# Patient Record
Sex: FEMALE | Race: WHITE | Employment: OTHER | URBAN - METROPOLITAN AREA
[De-identification: names, ages, dates, MRNs, and addresses within clinical notes are randomized per-mention and may not be internally consistent; named-entity substitution may affect disease eponyms.]

---

## 2017-07-07 ENCOUNTER — TELEPHONE (OUTPATIENT)
Dept: FAMILY MEDICINE CLINIC | Age: 82
End: 2017-07-07

## 2017-09-13 ENCOUNTER — OFFICE VISIT (OUTPATIENT)
Dept: FAMILY MEDICINE CLINIC | Age: 82
End: 2017-09-13
Payer: MEDICARE

## 2017-09-13 ENCOUNTER — HOSPITAL ENCOUNTER (OUTPATIENT)
Age: 82
Setting detail: SPECIMEN
Discharge: HOME | End: 2017-09-13
Payer: MEDICARE

## 2017-09-13 VITALS
HEIGHT: 65 IN | WEIGHT: 124 LBS | HEART RATE: 75 BPM | DIASTOLIC BLOOD PRESSURE: 70 MMHG | SYSTOLIC BLOOD PRESSURE: 120 MMHG | BODY MASS INDEX: 20.66 KG/M2 | RESPIRATION RATE: 18 BRPM | OXYGEN SATURATION: 97 % | TEMPERATURE: 98.4 F

## 2017-09-13 DIAGNOSIS — N30.00 ACUTE CYSTITIS WITHOUT HEMATURIA: ICD-10-CM

## 2017-09-13 DIAGNOSIS — R52 BODY ACHES: ICD-10-CM

## 2017-09-13 DIAGNOSIS — B96.89 ACUTE BACTERIAL SINUSITIS: Primary | ICD-10-CM

## 2017-09-13 DIAGNOSIS — J01.90 ACUTE BACTERIAL SINUSITIS: Primary | ICD-10-CM

## 2017-09-13 DIAGNOSIS — R00.0 TACHYCARDIA: ICD-10-CM

## 2017-09-13 DIAGNOSIS — R05.9 COUGH: ICD-10-CM

## 2017-09-13 LAB
BILIRUBIN, POC: ABNORMAL
BLOOD URINE, POC: ABNORMAL
CLARITY, POC: CLEAR
COLOR, POC: YELLOW
GLUCOSE URINE, POC: ABNORMAL
KETONES, POC: ABNORMAL
LEUKOCYTE EST, POC: ABNORMAL
NITRITE, POC: ABNORMAL
PH, POC: 5
PROTEIN, POC: ABNORMAL
SPECIFIC GRAVITY, POC: 1.02
UROBILINOGEN, POC: 0.2

## 2017-09-13 PROCEDURE — 1036F TOBACCO NON-USER: CPT | Performed by: NURSE PRACTITIONER

## 2017-09-13 PROCEDURE — G8427 DOCREV CUR MEDS BY ELIG CLIN: HCPCS | Performed by: NURSE PRACTITIONER

## 2017-09-13 PROCEDURE — 81003 URINALYSIS AUTO W/O SCOPE: CPT | Performed by: NURSE PRACTITIONER

## 2017-09-13 PROCEDURE — 1123F ACP DISCUSS/DSCN MKR DOCD: CPT | Performed by: NURSE PRACTITIONER

## 2017-09-13 PROCEDURE — 4040F PNEUMOC VAC/ADMIN/RCVD: CPT | Performed by: NURSE PRACTITIONER

## 2017-09-13 PROCEDURE — 99214 OFFICE O/P EST MOD 30 MIN: CPT | Performed by: NURSE PRACTITIONER

## 2017-09-13 PROCEDURE — 87086 URINE CULTURE/COLONY COUNT: CPT

## 2017-09-13 PROCEDURE — G8400 PT W/DXA NO RESULTS DOC: HCPCS | Performed by: NURSE PRACTITIONER

## 2017-09-13 PROCEDURE — G8420 CALC BMI NORM PARAMETERS: HCPCS | Performed by: NURSE PRACTITIONER

## 2017-09-13 PROCEDURE — 1090F PRES/ABSN URINE INCON ASSESS: CPT | Performed by: NURSE PRACTITIONER

## 2017-09-13 RX ORDER — AMOXICILLIN 500 MG/1
500 CAPSULE ORAL 3 TIMES DAILY
Qty: 30 CAPSULE | Refills: 0 | Status: SHIPPED | OUTPATIENT
Start: 2017-09-13 | End: 2017-09-23

## 2017-09-13 RX ORDER — MEDICAL SUPPLY, MISCELLANEOUS
EACH MISCELLANEOUS
COMMUNITY
End: 2020-07-13 | Stop reason: ALTCHOICE

## 2017-09-13 RX ORDER — AZITHROMYCIN 250 MG/1
TABLET, FILM COATED ORAL
Qty: 1 PACKET | Refills: 0 | Status: CANCELLED | OUTPATIENT
Start: 2017-09-13 | End: 2017-09-23

## 2017-09-13 RX ORDER — BENZONATATE 100 MG/1
100 CAPSULE ORAL 3 TIMES DAILY PRN
Qty: 21 CAPSULE | Refills: 0 | Status: SHIPPED | OUTPATIENT
Start: 2017-09-13 | End: 2017-09-20

## 2017-09-13 ASSESSMENT — ENCOUNTER SYMPTOMS
SORE THROAT: 0
SHORTNESS OF BREATH: 0
RHINORRHEA: 0
EYES NEGATIVE: 1
COUGH: 0
CHEST TIGHTNESS: 0
CONSTIPATION: 0

## 2017-09-13 ASSESSMENT — PATIENT HEALTH QUESTIONNAIRE - PHQ9
2. FEELING DOWN, DEPRESSED OR HOPELESS: 0
1. LITTLE INTEREST OR PLEASURE IN DOING THINGS: 0
SUM OF ALL RESPONSES TO PHQ QUESTIONS 1-9: 0
SUM OF ALL RESPONSES TO PHQ9 QUESTIONS 1 & 2: 0

## 2017-09-14 ENCOUNTER — HOSPITAL ENCOUNTER (OUTPATIENT)
Age: 82
Discharge: HOME | End: 2017-09-14
Payer: MEDICARE

## 2017-09-14 DIAGNOSIS — R00.0 TACHYCARDIA: ICD-10-CM

## 2017-09-14 DIAGNOSIS — N30.00 ACUTE CYSTITIS WITHOUT HEMATURIA: ICD-10-CM

## 2017-09-14 LAB
ANION GAP SERPL CALCULATED.3IONS-SCNC: 9 MMOL/L (ref 9–17)
BUN BLDV-MCNC: 17 MG/DL (ref 8–23)
BUN/CREAT BLD: 27 (ref 9–20)
CALCIUM SERPL-MCNC: 9 MG/DL (ref 8.6–10.4)
CHLORIDE BLD-SCNC: 105 MMOL/L (ref 98–107)
CO2: 28 MMOL/L (ref 20–31)
CREAT SERPL-MCNC: 0.64 MG/DL (ref 0.5–0.9)
CULTURE: NORMAL
CULTURE: NORMAL
GFR AFRICAN AMERICAN: >60 ML/MIN
GFR NON-AFRICAN AMERICAN: >60 ML/MIN
GFR SERPL CREATININE-BSD FRML MDRD: ABNORMAL ML/MIN/{1.73_M2}
GFR SERPL CREATININE-BSD FRML MDRD: ABNORMAL ML/MIN/{1.73_M2}
GLUCOSE BLD-MCNC: 99 MG/DL (ref 70–99)
Lab: NORMAL
POTASSIUM SERPL-SCNC: 4.3 MMOL/L (ref 3.7–5.3)
SODIUM BLD-SCNC: 142 MMOL/L (ref 135–144)
SPECIMEN DESCRIPTION: NORMAL
SPECIMEN DESCRIPTION: NORMAL
STATUS: NORMAL
TSH SERPL DL<=0.05 MIU/L-ACNC: 1.76 MIU/L (ref 0.3–5)

## 2017-09-14 PROCEDURE — 36415 COLL VENOUS BLD VENIPUNCTURE: CPT

## 2017-09-14 PROCEDURE — 80048 BASIC METABOLIC PNL TOTAL CA: CPT

## 2017-09-14 PROCEDURE — 93005 ELECTROCARDIOGRAM TRACING: CPT

## 2017-09-14 PROCEDURE — 84443 ASSAY THYROID STIM HORMONE: CPT

## 2017-09-15 ENCOUNTER — TELEPHONE (OUTPATIENT)
Dept: FAMILY MEDICINE CLINIC | Age: 82
End: 2017-09-15

## 2017-09-15 DIAGNOSIS — R00.0 TACHYCARDIA: Primary | ICD-10-CM

## 2017-09-15 LAB
EKG ATRIAL RATE: 64 BPM
EKG P AXIS: 64 DEGREES
EKG P-R INTERVAL: 170 MS
EKG Q-T INTERVAL: 416 MS
EKG QRS DURATION: 98 MS
EKG QTC CALCULATION (BAZETT): 429 MS
EKG R AXIS: 36 DEGREES
EKG T AXIS: 65 DEGREES
EKG VENTRICULAR RATE: 64 BPM

## 2017-09-18 ENCOUNTER — HOSPITAL ENCOUNTER (OUTPATIENT)
Dept: NON INVASIVE DIAGNOSTICS | Age: 82
Discharge: HOME | End: 2017-09-18
Payer: MEDICARE

## 2017-09-18 DIAGNOSIS — R00.0 TACHYCARDIA: ICD-10-CM

## 2017-09-18 PROCEDURE — 93225 XTRNL ECG REC<48 HRS REC: CPT

## 2017-09-25 ENCOUNTER — OFFICE VISIT (OUTPATIENT)
Dept: FAMILY MEDICINE CLINIC | Age: 82
End: 2017-09-25
Payer: MEDICARE

## 2017-09-25 VITALS
BODY MASS INDEX: 20.83 KG/M2 | OXYGEN SATURATION: 96 % | WEIGHT: 125 LBS | DIASTOLIC BLOOD PRESSURE: 70 MMHG | HEART RATE: 69 BPM | SYSTOLIC BLOOD PRESSURE: 130 MMHG | RESPIRATION RATE: 18 BRPM | HEIGHT: 65 IN

## 2017-09-25 DIAGNOSIS — R05.3 CHRONIC COUGH: ICD-10-CM

## 2017-09-25 DIAGNOSIS — I47.1 PAROXYSMAL SVT (SUPRAVENTRICULAR TACHYCARDIA) (HCC): Primary | ICD-10-CM

## 2017-09-25 PROBLEM — I47.10 PAROXYSMAL SVT (SUPRAVENTRICULAR TACHYCARDIA): Status: ACTIVE | Noted: 2017-09-25

## 2017-09-25 PROCEDURE — 1036F TOBACCO NON-USER: CPT | Performed by: NURSE PRACTITIONER

## 2017-09-25 PROCEDURE — G8400 PT W/DXA NO RESULTS DOC: HCPCS | Performed by: NURSE PRACTITIONER

## 2017-09-25 PROCEDURE — 4040F PNEUMOC VAC/ADMIN/RCVD: CPT | Performed by: NURSE PRACTITIONER

## 2017-09-25 PROCEDURE — G8420 CALC BMI NORM PARAMETERS: HCPCS | Performed by: NURSE PRACTITIONER

## 2017-09-25 PROCEDURE — 99214 OFFICE O/P EST MOD 30 MIN: CPT | Performed by: NURSE PRACTITIONER

## 2017-09-25 PROCEDURE — G8427 DOCREV CUR MEDS BY ELIG CLIN: HCPCS | Performed by: NURSE PRACTITIONER

## 2017-09-25 PROCEDURE — 1090F PRES/ABSN URINE INCON ASSESS: CPT | Performed by: NURSE PRACTITIONER

## 2017-09-25 PROCEDURE — 1123F ACP DISCUSS/DSCN MKR DOCD: CPT | Performed by: NURSE PRACTITIONER

## 2017-09-25 RX ORDER — RANITIDINE 150 MG/1
150 TABLET ORAL NIGHTLY
Qty: 30 TABLET | Refills: 0 | Status: SHIPPED | OUTPATIENT
Start: 2017-09-25 | End: 2018-01-04 | Stop reason: SDUPTHER

## 2017-09-25 RX ORDER — OMEPRAZOLE 20 MG/1
20 CAPSULE, DELAYED RELEASE ORAL DAILY
Qty: 30 CAPSULE | Refills: 0 | Status: SHIPPED | OUTPATIENT
Start: 2017-09-25 | End: 2017-10-27 | Stop reason: ALTCHOICE

## 2017-09-25 ASSESSMENT — ENCOUNTER SYMPTOMS
CHEST TIGHTNESS: 0
SINUS PRESSURE: 0
RHINORRHEA: 0
DIARRHEA: 0

## 2017-09-28 ENCOUNTER — CARE COORDINATION (OUTPATIENT)
Dept: CARE COORDINATION | Age: 82
End: 2017-09-28

## 2017-10-02 NOTE — CARE COORDINATION
Ambulatory Care Coordination Note  10/2/2017  CM Risk Score: 0  Leigh Mortality Risk Score: 9.69    ACC: Mary Freeman RN    Summary Note: Phone call to patient to enroll in care coordination. Patient reports she has 10 children and 25 grandchildren. Patient reports her son lives nearby and helps her when needed. 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 Patient reports she is also a . Patient reports she is not having dizziness or chest pain at this time. Patient reports she cannot lay flat or lean over stating \"this has been going on for years. \"  Patient states she is not having shortness or breath or any problems at this time. Patient confirmed she is taking cardizem 30 mg in the morning and evening. Patient reports she started Prilosec 9/28/2017 and then she will start taking Zantac 10/03/2017 as instructed by Jaswinder Glaser CNP. Instructed patient to call the doctor if the patient has: 1. Ankles and legs that become more swollen. 2. Shoes and socks that get tight suddenly. 3. Shortness of breath that does not go away with rest. 4. Weight gain of 2 - 3 pounds in one day. 5. Weight gain of 4 - 5 pounds in one week. 6. No energy for normal activities. 7. Dizzyness or weakness. 8. Yellowish or blue green vision. 9. Heartbeat changes (feels like a butterfly in the chest). 10. Chest pain. 11. Blurred vision. 12. Passing out. 13. Cough that does not go away. **Patient verbalizes understanding. Patient declines need for assistance in the home and reports she has stable resources for paying her bills. CC plan: Will follow up with patient next week.     Ambulatory Care Coordination Assessment    Care Coordination Protocol   Program Enrollment:  Rising Risk   Referral from Primary Care Provider:  No   Week 1 - Initial Assessment      Do you have all of your prescriptions and are they filled?:  Yes   Barriers to medication adherence:  None   Are you able to afford your medications?:  Yes   How often do you have trouble taking your medications the way you have been told to take them?:  I always take them as prescribed. Do you have Home O2 Therapy?:  No               Ability to seek help/take action for Emergent Urgent situations i.e. fire, crime, inclement weather or health crisis. :  Independent   Ability to ambulate to restroom:  Independent   Ability handle personal hygeine needs (bathing/dressing/grooming): Independent   Ability to manage Medications: Independent   Ability to prepare Food Preparation:  Independent   Ability to maintain home (clean home, laundry): Independent   Ability to drive and/or has transportation:  Independent   Ability to do shopping:  Independent   Ability to manage finances: Independent   Is patient able to live independently?:  Yes               Current Housing:  Private Residence                        Per the Fall Risk Screening, did the patient have 2 or more falls or 1 fall with injury in the past year?:  No      Frequent urination at night?:  No   Do you use rails/bars?:  No   Do you have a non-slip tub mat?:  No            Are you experiencing loss of meaning?:  No   Are you experiencing loss of hope and peace?:  No               Thinking about your patient's physical health needs, are there any symptoms or problems (risk indicators) you are unsure about that require further investigation?:  Mild vague physical symptoms or problems; but do not impact on daily life or are not of concern to patient   Are the patients physical health problems impacting on their mental well-being?:  No identified areas of concern   Are there any problems with your patients lifestyle behaviors (alcohol, drugs, diet, exercise) that are impacting on physical or mental well-being?:  No identified areas of concern   Do you have any other concerns about your patients mental well-being?  How would you rate their severity and impact on the patient?:  No identified areas of concern   How would you rate their home environment in terms of safety and stability (including domestic violence, insecure housing, neighbor harassment)?:  Consistently safe, supportive, stable, no identified problems   How do daily activities impact on the patient's well-being? (include current or anticipated unemployment, work, caregiving, access to transportation or other):  No identified problems or perceived positive benefits   How would you rate their social network (family, work, friends)?:  Good participation with social networks   How would you rate their financial resources (including ability to afford all required medical care)?:  Financially secure, some resource challenges   How wells does the patient now understand their health and well-being (symptoms, signs or risk factors) and what they need to do to manage their health?:  Reasonable to good understanding and already engages in managing health or is willing to undertake better management   How well do you think your patient can engage in healthcare discussions? (Barriers include language, deafness, aphasia, alcohol or drug problems, learning difficulties, concentration):  Clear and open communication, no identified barriers                  Suggested Interventions and Community Resources   No Identified Needs          Set up/Review an Education Plan, Set up/Review Goals                    Prior to Admission medications    Medication Sig Start Date End Date Taking?  Authorizing Provider   omeprazole (PRILOSEC) 20 MG delayed release capsule Take 1 capsule by mouth Daily On empty stomach 9/25/17  Yes Aryan Darnell NP   ranitidine (ZANTAC) 150 MG tablet Take 1 tablet by mouth nightly 9/25/17  Yes Aryan Darnell NP   diltiazem (CARDIZEM) 30 MG tablet Take 1 tablet by mouth 2 times daily 9/15/17  Yes Aryan Darnell NP   aspirin 81 MG tablet Take 81 mg by mouth daily   Yes Historical Provider, MD   Nutritional Supplements (NUTRITIONAL DRINK) LIQD Take by mouth Indications: Boost drinks 1 a day. Yes Historical Provider, MD   Multiple Vitamins-Minerals (THERAPEUTIC MULTIVITAMIN-MINERALS) tablet Take 1 tablet by mouth daily   Yes Historical Provider, MD   Calcium-Magnesium-Vitamin D (CALCIUM 1200+D3 PO) Take 1 tablet by mouth daily   Yes Historical Provider, MD   Glucosamine 500 MG CAPS Take 1 tablet by mouth daily   Yes Historical Provider, MD   Chondroitin Sulfate 400 MG CAPS Take 1 tablet by mouth daily   Yes Historical Provider, MD   NONFORMULARY     Historical Provider, MD   triamcinolone (KENALOG) 0.1 % cream Apply to the affected area 2 times a day.  6/17/16   Lory Ballesteros NP       Future Appointments  Date Time Provider Shana Nava   11/20/2017 5:00 PM VERNA Kruse [facility]

## 2017-10-05 ENCOUNTER — CARE COORDINATION (OUTPATIENT)
Dept: CARE COORDINATION | Age: 82
End: 2017-10-05

## 2017-10-05 NOTE — CARE COORDINATION
Ambulatory Care Coordination Note  10/5/2017  CM Risk Score: 0  Leigh Mortality Risk Score: 9.69    ACC: Africa Lester RN    Summary Note: Phone call to patient. Patient reports she is doing well--denies chest pain, shortness of breath or dizziness this past week. Patient reports she is taking diltiazem 30 mg bid as ordered. Patient states she has never been on medications before, except for when she has a cold, so she is writing down when she takes her medications so she can keep track of them. Patient states she is tolerating the Prilosec without any problems, but she has not noticed any improvement in her cough. Patient states her cough is dry and occurs all throughout the day. Patient states she thinks her cough is from second hand smoke that she was exposed to from her father and her . Patient reports she started Zantac 150 mg at night on 10/04/2017. Patient denies having any questions about her medications at this time. Patient reports she walks around and does not limit her activity, as she is caring for her grandchildren during the day. Patient states she does not bend over, as that is when she feels dizzy. Patient reports she has good support from her children. Discussed vaccines at length with this patient, including pneumonia, flu, tetanus and shingles vaccine. Patient states she has not had the flu vaccine for years, because the last time she got is(over 10 years ago) she came down with the flu. This nurse discussed the current flu shot and how it can prevent illness from multiple strains. Reviewed the need for second pneumonia vaccine, but patient refuses this vaccine because she became ill after her first pneumonia vaccine. Discussed DTap and the benefits, especially due to the fact that the patient spends a lot of time with children. Patient states \"I will think about this information and talk to Berna Huerta when I go for my annual in November. \"    Instructed the patient to call

## 2017-10-27 ENCOUNTER — CARE COORDINATION (OUTPATIENT)
Dept: CARE COORDINATION | Age: 82
End: 2017-10-27

## 2017-10-27 RX ORDER — OMEPRAZOLE 20 MG/1
20 CAPSULE, DELAYED RELEASE ORAL DAILY
Qty: 30 CAPSULE | Refills: 2 | Status: SHIPPED | OUTPATIENT
Start: 2017-10-27 | End: 2018-02-05 | Stop reason: SDUPTHER

## 2017-10-27 RX ORDER — RANITIDINE 150 MG/1
150 TABLET ORAL NIGHTLY PRN
Qty: 30 TABLET | Refills: 0 | Status: SHIPPED | OUTPATIENT
Start: 2017-10-27 | End: 2017-12-11 | Stop reason: SDUPTHER

## 2017-10-27 NOTE — CARE COORDINATION
Ambulatory Care Coordination Note  10/27/2017  CM Risk Score: 0  Leigh Mortality Risk Score: 9.69    ACC: Floridalma Bazan, RN    Summary Note: Phone call to patient. Patient reports she has not had any falls, syncope, dizziness or chest pain. Patient reports he only feels dizzy if she bends over, so she is very careful to avoid bending or moving to a new position too fast.  Patient reports she is watching her grandchildren. Reviewed medications with patient. Patient reports she is taking her diltiazem 30 mg bid. Patient states she is able to afford her medications, as most of them cost less than $10 per month. Patient reports she tried the prilosec and the zantac for the month and she reports her cough is 90% improved. Patient states she ran out of the medications and she will not be taking them any longer. This nurse explained that Gary Raymundo will order refills as the patient has shown improvement and it is best to take the medication daily to maintain the effect. Patient states she has been off of the medication for over a week and she has not noticed an increase in cough. Patient states she does not want to take medications, but she know she has to take her heart medication. Patient states \"I have had this cough since I was a little girl, so it is not a big deal.  I am not coughing very much at all right now, so I do not want to take any medication for it. \"  This nurse instructed patient to call PCP if her cough gets worse and the medication will be restarted. Patient verbalizes understanding. Reviewed need for immunizations with patient. Patient states she she does not want to get any vaccinations because they have made her sick in the past.      Patient reports good support from her family and reports she has adequate food supply. Patient enjoys walking outside and goes to the school crosswalk every day to make sure they are safe.   Reminded patient of follow up appointment with Larry Mc Yvan 11/20/2017. CC Plan: Will follow up in 2 weeks to assess cough, medications(taking prilosec). Care Coordination Interventions    Program Enrollment:  Rising Risk  Referral from Primary Care Provider:  No  Suggested Interventions and Community Resources  No Identified Needs         Goals Addressed             Most Recent     Conditions and Symptoms   On track (10/27/2017)             I will schedule office visits, as directed by my provider. I will keep my appointment or reschedule if I have to cancel. I will notify my provider of any barriers to my plan of care. I will follow my Zone Management tool to seek urgent or emergent care. I will notify my provider of any symptoms that indicate a worsening of my condition. Barriers: none  Plan for overcoming my barriers: N/A  Confidence: 8/10  Anticipated Goal Completion Date: 12/01/2017              Prior to Admission medications    Medication Sig Start Date End Date Taking? Authorizing Provider   diltiazem (CARDIZEM) 30 MG tablet TAKE 1 TABLET BY MOUTH 2 TIMES DAILY 10/16/17  Yes Melinda Nunn NP   aspirin 81 MG tablet Take 81 mg by mouth daily   Yes Historical Provider, MD   Nutritional Supplements (NUTRITIONAL DRINK) LIQD Take by mouth Indications: Boost drinks 1 a day.    Yes Historical Provider, MD   Multiple Vitamins-Minerals (THERAPEUTIC MULTIVITAMIN-MINERALS) tablet Take 1 tablet by mouth daily   Yes Historical Provider, MD   Calcium-Magnesium-Vitamin D (CALCIUM 1200+D3 PO) Take 1 tablet by mouth daily   Yes Historical Provider, MD   Glucosamine 500 MG CAPS Take 1 tablet by mouth daily   Yes Historical Provider, MD   Chondroitin Sulfate 400 MG CAPS Take 1 tablet by mouth daily   Yes Historical Provider, MD   omeprazole (PRILOSEC) 20 MG delayed release capsule Take 1 capsule by mouth Daily On empty stomach 9/25/17   Melinda Nunn NP   ranitidine (ZANTAC) 150 MG tablet Take 1 tablet by mouth nightly 9/25/17   Melinda Nunn NP   NONFORMULARY

## 2017-10-27 NOTE — CARE COORDINATION
Sent more omeprazole to pharmacy can continue daily, also 1 month zantac sent too just use nightly prn. '

## 2017-10-30 NOTE — CARE COORDINATION
Phone call to patient to inform her that Barbara Jean CNP does want her to continue the Prilosec 20 mg qd and she can take Zantac 150 mg prn at bedtime. Informed patient that the prescriptions were sent to [facility]. Patient verbalizes understanding and states she will  the medication on Wednesday. Reminded patient to take Prilosec 1 hr before she eats. Patient asked if she can drink with milk, but this nurse informed her to take with water only to achieve maximum  Benefit of the drug. Reviewed the importance of taking the Priolosec every day to decrease the inflammation in the esophagus and decrease cough. Patient verbalizes understanding.

## 2017-11-13 ENCOUNTER — CARE COORDINATION (OUTPATIENT)
Dept: CARE COORDINATION | Age: 82
End: 2017-11-13

## 2017-11-13 NOTE — CARE COORDINATION
Ambulatory Care Coordination Note  11/13/2017  CM Risk Score: 0  Leigh Mortality Risk Score: 9.69    ACC: Karri Palafox, RN    Summary Note: Phone call to patient. Patient states she did  the Prilosec and she is taking every day. Paitent states she has the Zantac, but she is not taking on a daily basis--only prn. Patient states her cough is \"much better\" since starting the prilosec. Patient states she still has the cough, but it is not a constant annoying cough--only occasional cough. Patient reports she did have one episode of dizziness last week when she was cleaning off her table, but she sat down to rest and it resolved. Patient reports she is taking her Diltiazem 30 mg in the morning and evening. Patient states she called Sil's office this morning to have her refill the diltiazem--this nurse reviewed the medication record and informed patient that she has 3 refills at the pharmacy. Patient verbalizes understanding and states she will  tomorrow. Patient is in good spirits and reports good support from her family. Reminded patient of her appointment 11/20/2017 with Sarah Dove at 5:00 pm.  Patient states she will attend the appt. CC plan: Will follow up with patient after her appointment with PCP. Care Coordination Interventions    Program Enrollment:  Rising Risk  Referral from Primary Care Provider:  No  Suggested Interventions and Community Resources  No Identified Needs         Goals Addressed             Most Recent     Conditions and Symptoms   On track (11/13/2017)             I will schedule office visits, as directed by my provider. I will keep my appointment or reschedule if I have to cancel. I will notify my provider of any barriers to my plan of care. I will follow my Zone Management tool to seek urgent or emergent care. I will notify my provider of any symptoms that indicate a worsening of my condition.     Barriers: none  Plan for overcoming my barriers: N/A  Confidence: 8/10  Anticipated Goal Completion Date: 12/01/2017              Prior to Admission medications    Medication Sig Start Date End Date Taking? Authorizing Provider   omeprazole (PRILOSEC) 20 MG delayed release capsule Take 1 capsule by mouth daily 10/27/17  Yes Harlan Cabello NP   diltiazem (CARDIZEM) 30 MG tablet TAKE 1 TABLET BY MOUTH 2 TIMES DAILY 10/16/17  Yes Harlan Cabello NP   NONFORMULARY    Yes Historical Provider, MD   aspirin 81 MG tablet Take 81 mg by mouth daily   Yes Historical Provider, MD   Nutritional Supplements (NUTRITIONAL DRINK) LIQD Take by mouth Indications: Boost drinks 1 a day. Yes Historical Provider, MD   Multiple Vitamins-Minerals (THERAPEUTIC MULTIVITAMIN-MINERALS) tablet Take 1 tablet by mouth daily   Yes Historical Provider, MD   Calcium-Magnesium-Vitamin D (CALCIUM 1200+D3 PO) Take 1 tablet by mouth daily   Yes Historical Provider, MD   Glucosamine 500 MG CAPS Take 1 tablet by mouth daily   Yes Historical Provider, MD   Chondroitin Sulfate 400 MG CAPS Take 1 tablet by mouth daily   Yes Historical Provider, MD   ranitidine (ZANTAC) 150 MG tablet Take 1 tablet by mouth nightly as needed for Heartburn 10/27/17   Harlan Cabello NP   ranitidine (ZANTAC) 150 MG tablet Take 1 tablet by mouth nightly 9/25/17   Harlan Cabello NP   triamcinolone (KENALOG) 0.1 % cream Apply to the affected area 2 times a day.  6/17/16   Harlan Cabello NP       Future Appointments  Date Time Provider Shana Nava   11/20/2017 5:00 PM VERNA Finch [facility]

## 2017-11-20 ENCOUNTER — OFFICE VISIT (OUTPATIENT)
Dept: FAMILY MEDICINE CLINIC | Age: 82
End: 2017-11-20
Payer: MEDICARE

## 2017-11-20 ENCOUNTER — HOSPITAL ENCOUNTER (OUTPATIENT)
Dept: GENERAL RADIOLOGY | Age: 82
Discharge: HOME | End: 2017-11-20
Payer: MEDICARE

## 2017-11-20 ENCOUNTER — HOSPITAL ENCOUNTER (OUTPATIENT)
Age: 82
Discharge: HOME | End: 2017-11-20
Payer: MEDICARE

## 2017-11-20 ENCOUNTER — HOSPITAL ENCOUNTER (OUTPATIENT)
Age: 82
End: 2017-11-20
Payer: MEDICARE

## 2017-11-20 VITALS
WEIGHT: 128 LBS | SYSTOLIC BLOOD PRESSURE: 110 MMHG | DIASTOLIC BLOOD PRESSURE: 64 MMHG | HEIGHT: 65 IN | OXYGEN SATURATION: 97 % | HEART RATE: 67 BPM | RESPIRATION RATE: 18 BRPM | BODY MASS INDEX: 21.33 KG/M2

## 2017-11-20 DIAGNOSIS — M54.17 LUMBOSACRAL RADICULOPATHY AT L5: ICD-10-CM

## 2017-11-20 DIAGNOSIS — Z00.00 ROUTINE GENERAL MEDICAL EXAMINATION AT A HEALTH CARE FACILITY: ICD-10-CM

## 2017-11-20 DIAGNOSIS — Z78.0 ASYMPTOMATIC POSTMENOPAUSAL ESTROGEN DEFICIENCY: ICD-10-CM

## 2017-11-20 DIAGNOSIS — Z13.820 OSTEOPOROSIS SCREENING: Primary | ICD-10-CM

## 2017-11-20 PROCEDURE — 72110 X-RAY EXAM L-2 SPINE 4/>VWS: CPT

## 2017-11-20 PROCEDURE — G0438 PPPS, INITIAL VISIT: HCPCS | Performed by: NURSE PRACTITIONER

## 2017-11-20 ASSESSMENT — ANXIETY QUESTIONNAIRES: GAD7 TOTAL SCORE: 0

## 2017-11-20 ASSESSMENT — PATIENT HEALTH QUESTIONNAIRE - PHQ9: SUM OF ALL RESPONSES TO PHQ QUESTIONS 1-9: 0

## 2017-11-20 ASSESSMENT — LIFESTYLE VARIABLES: HOW OFTEN DO YOU HAVE A DRINK CONTAINING ALCOHOL: 0

## 2017-11-20 NOTE — PROGRESS NOTES
Medicare Annual Wellness Visit  Name: Ibis Escobedo Date: 2017   MRN: [id number] Sex: Female   Age: [de-identified] y.o. Ethnicity: Unavailable/Unknown   : 1935 Race: Joanne Winston is here for Medicare AWV    Screenings for behavioral, psychosocial and functional/safety risks, and cognitive dysfunction are all negative except as indicated below. These results, as well as other patient data from the [address] form, are documented in Flowsheets linked to this Encounter. No Known Allergies  Prior to Visit Medications    Medication Sig Taking? Authorizing Provider   omeprazole (PRILOSEC) 20 MG delayed release capsule Take 1 capsule by mouth daily  Orpha VERNA Fallon   ranitidine (ZANTAC) 150 MG tablet Take 1 tablet by mouth nightly as needed for Heartburn  Oraba Fallon NP   diltiazem (CARDIZEM) 30 MG tablet TAKE 1 TABLET BY MOUTH 2 TIMES DAILY  Orpha Vasyl NP   ranitidine (ZANTAC) 150 MG tablet Take 1 tablet by mouth nightly  Oraba Fallon NP   NONFORMULARY   Historical Provider, MD   aspirin 81 MG tablet Take 81 mg by mouth daily  Historical Provider, MD   Nutritional Supplements (NUTRITIONAL DRINK) LIQD Take by mouth Indications: Boost drinks 1 a day. Historical Provider, MD   triamcinolone (KENALOG) 0.1 % cream Apply to the affected area 2 times a day.   Oraba Fallon NP   Multiple Vitamins-Minerals (THERAPEUTIC MULTIVITAMIN-MINERALS) tablet Take 1 tablet by mouth daily  Historical Provider, MD   Calcium-Magnesium-Vitamin D (CALCIUM 1200+D3 PO) Take 1 tablet by mouth daily  Historical Provider, MD   Glucosamine 500 MG CAPS Take 1 tablet by mouth daily  Historical Provider, MD   Chondroitin Sulfate 400 MG CAPS Take 1 tablet by mouth daily  Historical Provider, MD     Past Medical History:   Diagnosis Date    Paroxysmal SVT (supraventricular tachycardia) ([facility] [address]      Past Surgical History:   Procedure Laterality Date    CHOLECYSTECTOMY      EYE SURGERY Right 8/24/15 cataract    EYE SURGERY Left 10/01/2015    cataract    OTHER SURGICAL HISTORY  03/2017    carotid eval Dr. Salvador Arteaga with good results no significant stenosis but tortuous ICA no follow needed     Family History   Problem Relation Age of Onset    Cancer Father 71     esophagus    Other Brother 71     ALS    Heart Disease Brother        CareTeam (Including outside providers/suppliers regularly involved in providing care):   Patient Care Team:  Caridad Salinas NP as PCP - General  Caridad Salinas NP as PCP - S Attributed Provider  Clementina Vazquez RN as Care Coordinator    Wt Readings from Last 3 Encounters:   11/20/17 128 lb (58.1 kg)   09/25/17 125 lb (56.7 kg)   09/13/17 124 lb (56.2 kg)     Vitals:    11/20/17 1651   BP: 110/64   Site: Left Arm   Position: Sitting   Cuff Size: Medium Adult   Pulse: 67   Resp: 18   SpO2: 97%   Weight: 128 lb (58.1 kg)   Height: 5' 5\" (1.651 m)       General Appearance: alert and oriented to person, place and time, well developed and well- nourished, in no acute distress, appears younger than stated age. No assistive devices to ambulate. Skin: warm and dry, no rash or erythema  Head: normocephalic and atraumatic  Eyes: pupils equal, round, and reactive to light, extraocular eye movements intact, wears glasses  ENT: tympanic membrane, external ear and ear canal normal bilaterally, nose without deformity  Neck: supple and non-tender without mass, no thyromegaly or thyroid nodules, no cervical lymphadenopathy  Pulmonary/Chest: clear to auscultation bilaterally- no wheezes, rales or rhonchi, normal air movement, no respiratory distress  Cardiovascular: normal rate, regular rhythm, normal S1 and S2, no murmurs, rubs, clicks, or gallops, distal pulses intact,  Abdomen: soft, non-tender, non-distended,   Extremities: no cyanosis, clubbing or edema. Lower extremities without hair , c/o numbness in toes bilaterally with elevation of legs.  Denies low back pain no tenderness or SI pain with palpation. Able to stand on tip toes and heels without back pain. Musculoskeletal: normal range of motion, no joint swelling, deformity or tenderness  Neurologic: gait, coordination and speech normal    Patient's complete Health Risk Assessment and screening values have been reviewed and are found in Flowsheets. The following problems were reviewed today and where indicated follow up appointments were made and/or referrals ordered. Positive Risk Factor Screenings with Interventions:     General Health:  General  In general, how would you say your health is?: Very Good  In the past 7 days, have you experienced any of the following?: None of These  Do you get the social and emotional support that you need?: Yes  Do you have a Living Will?: (!) No  General Health Risk Interventions:  · No Living Will: additional information provided    Health Habits/Nutrition:  Health Habits/Nutrition  Do you exercise for at least 20 minutes 2-3 times per week?: Yes  Have you lost any weight without trying in the past 3 months?: No  Do you eat fewer than 2 meals per day?: No  Have you seen a dentist within the past year?: (!) No  Body mass index is 21.3 kg/m².   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    Hearing/Vision:  Hearing/Vision  Do you or your family notice any trouble with your hearing?: (!) Yes  Do you have difficulty driving, watching TV, or doing any of your daily activities because of your eyesight?: No  Have you had an eye exam within the past year?: Yes  Hearing/Vision Interventions:  · Vision concerns:  seeing eye specialist routinely wears glasses    Safety:  Safety  Do you have working smoke detectors?: Yes  Have all throw rugs been removed or fastened?: (!) No  Do you have non-slip mats in all bathtubs?: Yes  Do all of your stairways have a railing or banister?: Yes  Are your doorways, halls and stairs free of clutter?: Yes  Do you always fasten your seatbelt when you are in a car?: Yes  Safety Interventions:  · Home safety tips provided      Personalized Preventive Plan   Current Health Maintenance Status  Immunization History   Administered Date(s) Administered    Pneumococcal Polysaccharide ([id number]) 01/01/2012        Health Maintenance   Topic Date Due    DTaP/Tdap/Td vaccine (1 - Tdap) 04/11/1954    Pneumococcal low/med risk (2 of 2 - PCV13) 01/01/2013    Flu vaccine (1) 09/01/2017    Zostavax vaccine  Addressed    DEXA (modify frequency per FRAX score)  Addressed     Recommendations for Preventive Services Due: see orders. Recommended screening schedule for the next 5-10 years is provided to the patient in written form: see Patient Instructions/AVS.      Advanced Care Planning: Discussed the patients choices for care and treatment in case of a health event that adversely affects decision-making abilities. Also discussed the patients long-term treatment options. Reviewed with the patient the [address] [place] [address] Declaration forms  Reviewed the process of designating a competent adult as an Agent (or -in-fact) that could take make health care decisions for the patient if incompetent. Patient was asked to complete the declaration forms, either acknowledge the forms by a public notary or an eligible witness and provide a signed copy to the practice office. Time spent (minutes): 10 minutes willing to review advance directives by scheduling appt with .

## 2017-11-20 NOTE — PATIENT INSTRUCTIONS
Personalized Preventive Plan for Denise Jose - 11/20/2017  Medicare offers a range of preventive health benefits. Some of the tests and screenings are paid in full while other may be subject to a deductible, co-insurance, and/or copay. Some of these benefits include a comprehensive review of your medical history including lifestyle, illnesses that may run in your family, and various assessments and screenings as appropriate. After reviewing your medical record and screening and assessments performed today your provider may have ordered immunizations, labs, imaging, and/or referrals for you. A list of these orders (if applicable) as well as your Preventive Care list are included within your After Visit Summary for your review. Other Preventive Recommendations:    · A preventive eye exam performed by an eye specialist is recommended every 1-2 years to screen for glaucoma; cataracts, macular degeneration, and other eye disorders. · A preventive dental visit is recommended every 6 months. · Try to get at least 150 minutes of exercise per week or 10,000 steps per day on a pedometer . · Order or download the FREE \"Exercise & Physical Activity: Your Everyday Guide\" from [employer]. Call 3-618.485.7699 or search [employer] online. · You need 3639-0497 mg of calcium and 7455-3575 IU of vitamin D per day. It is possible to meet your calcium requirement with diet alone, but a vitamin D supplement is usually necessary to meet this goal.  · When exposed to the sun, use a sunscreen that protects against both UVA and UVB radiation with an SPF of 30 or greater. Reapply every 2 to 3 hours or after sweating, drying off with a towel, or swimming. · Always wear a seat belt when traveling in a car. Always wear a helmet when riding a bicycle or motorcycle.

## 2017-11-29 ENCOUNTER — CARE COORDINATION (OUTPATIENT)
Dept: CARE COORDINATION | Age: 82
End: 2017-11-29

## 2017-11-29 NOTE — CARE COORDINATION
reach a phone. Or, you can wear a device around your neck or wrist. You push a button that sends a signal for help. · Wear low-heeled shoes that fit well and give your feet good support. Use footwear with nonskid soles. Check the heels and soles of your shoes for wear. Repair or replace worn heels or soles. · Do not wear socks without shoes on wood floors. · Walk on the grass when the sidewalks are slippery. If you live in an area that gets snow and ice in the winter, sprinkle salt on slippery steps and sidewalks. Patient verbalizes understanding. Patient states she has not had any dizziness or chest pain since the last contact iwht this nurse. 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 Patient states today is the anniversary of her 's death. Provided support to patient. Patient reports good support from her neighbors and family. CC plan: Will follow up in 3-4 weeks to assure patient is having DEXA scan. Care Coordination Interventions    Program Enrollment:  Rising Risk  Referral from Primary Care Provider:  No  Suggested Interventions and Community Resources  No Identified Needs         Goals Addressed             Most Recent     Conditions and Symptoms   On track (11/29/2017)             I will schedule office visits, as directed by my provider. I will keep my appointment or reschedule if I have to cancel. I will notify my provider of any barriers to my plan of care. I will follow my Zone Management tool to seek urgent or emergent care. I will notify my provider of any symptoms that indicate a worsening of my condition. Barriers: none  Plan for overcoming my barriers: N/A  Confidence: 8/10  Anticipated Goal Completion Date: 12/01/2017              Prior to Admission medications    Medication Sig Start Date End Date Taking?  Authorizing Provider   omeprazole (PRILOSEC) 20 MG delayed release capsule Take 1 capsule by mouth daily

## 2017-12-11 ENCOUNTER — TELEPHONE (OUTPATIENT)
Dept: FAMILY MEDICINE CLINIC | Age: 82
End: 2017-12-11

## 2017-12-11 RX ORDER — RANITIDINE 150 MG/1
150 TABLET ORAL NIGHTLY PRN
Qty: 90 TABLET | Refills: 1 | Status: SHIPPED | OUTPATIENT
Start: 2017-12-11 | End: 2018-08-27 | Stop reason: ALTCHOICE

## 2017-12-27 ENCOUNTER — HOSPITAL ENCOUNTER (OUTPATIENT)
Dept: BONE DENSITY | Age: 82
Discharge: HOME | End: 2017-12-27
Payer: MEDICARE

## 2017-12-27 DIAGNOSIS — Z13.820 OSTEOPOROSIS SCREENING: ICD-10-CM

## 2017-12-27 DIAGNOSIS — Z78.0 ASYMPTOMATIC POSTMENOPAUSAL ESTROGEN DEFICIENCY: ICD-10-CM

## 2017-12-27 PROCEDURE — 77080 DXA BONE DENSITY AXIAL: CPT

## 2018-01-04 ENCOUNTER — CARE COORDINATION (OUTPATIENT)
Dept: CARE COORDINATION | Age: 83
End: 2018-01-04

## 2018-01-04 RX ORDER — ACETAMINOPHEN 160 MG
1 TABLET,DISINTEGRATING ORAL DAILY
COMMUNITY
End: 2019-11-04

## 2018-01-04 RX ORDER — PHENOL 1.4 %
1 AEROSOL, SPRAY (ML) MUCOUS MEMBRANE DAILY
COMMUNITY

## 2018-01-04 NOTE — CARE COORDINATION
Ambulatory Care Coordination Note  1/4/2018  CM Risk Score: 0  Leigh Mortality Risk Score: 7.36    ACC: Reji Milner RN    Summary Note: Phone call to patient. Reviewed results of DEXA scan:  Notes Recorded by Fabiana Valdez NP on 12/28/2017 at 8:21 PM EST  Inform patient dexa scan does show osteopenia or weakening of the bones. Mostly in lumbar spine and left femoral neck (hip) area. Would encourage calcium 600mg with vitamin D 3 supplement at 2000 iu daily. Can discuss possible biphosphonate to prevent further bone loss at next visit. Patient confirmed she did  the Calcium and Vit D and is able to verbalize correct dose. This nurse discussed follow up appt with Brittni Ontiveros due around 5/20/2018. Patient states she will call for her own appointment after she confirms when her daughter would be available to go with her. Patient asked how long she will need to be on Prilosec. This nurse reviewed GERD with patient and informed her that Darion Piña would like her on this medication long term. Patient reports decrease in frequency of her chronic cough since starting the Prilosec and Zantac. Patient is agreeable to continue this medications. Patient is able to afford her medications. Patient denies having any chest pain, syncope or shortness of breath. Reviewed when to call PCP and methods to prevent infection. Patient verbalizes understanding. Mailed patient handout on GERD. CC plan: Will follow up in 3-4 weeks to assess GERD and medication compliance. Care Coordination Interventions    Program Enrollment:  Rising Risk  Referral from Primary Care Provider:  No  Suggested Interventions and Community Resources  No Identified Needs         Goals Addressed             Most Recent     Conditions and Symptoms   On track (1/4/2018)             I will schedule office visits, as directed by my provider. I will keep my appointment or reschedule if I have to cancel.   I will notify my provider of any barriers to my plan of care. I will follow my Zone Management tool to seek urgent or emergent care. I will notify my provider of any symptoms that indicate a worsening of my condition. Barriers: none  Plan for overcoming my barriers: N/A  Confidence: 8/10  Anticipated Goal Completion Date: 12/01/2017              Prior to Admission medications    Medication Sig Start Date End Date Taking? Authorizing Provider   calcium carbonate 600 MG TABS tablet Take 1 tablet by mouth daily   Yes Historical Provider, MD   Cholecalciferol (VITAMIN D3) 2000 units CAPS Take 1 capsule by mouth daily   Yes Historical Provider, MD   ranitidine (ZANTAC) 150 MG tablet Take 1 tablet by mouth nightly as needed for Heartburn 12/11/17  Yes Isabel Godfrey NP   omeprazole (PRILOSEC) 20 MG delayed release capsule Take 1 capsule by mouth daily 10/27/17  Yes Isabel Godfrey NP   diltiazem (CARDIZEM) 30 MG tablet TAKE 1 TABLET BY MOUTH 2 TIMES DAILY 10/16/17  Yes Isabel Godfrey NP   NONFORMULARY    Yes Historical Provider, MD   aspirin 81 MG tablet Take 81 mg by mouth daily   Yes Historical Provider, MD   Nutritional Supplements (NUTRITIONAL DRINK) LIQD Take by mouth Indications: Boost drinks 1 a day. Yes Historical Provider, MD   Multiple Vitamins-Minerals (THERAPEUTIC MULTIVITAMIN-MINERALS) tablet Take 1 tablet by mouth daily   Yes Historical Provider, MD   Glucosamine 500 MG CAPS Take 1 tablet by mouth daily   Yes Historical Provider, MD   Chondroitin Sulfate 400 MG CAPS Take 1 tablet by mouth daily   Yes Historical Provider, MD   triamcinolone (KENALOG) 0.1 % cream Apply to the affected area 2 times a day. 6/17/16   Isabel Godfrey NP       No future appointments.

## 2018-02-05 ENCOUNTER — TELEPHONE (OUTPATIENT)
Dept: FAMILY MEDICINE CLINIC | Age: 83
End: 2018-02-05

## 2018-02-05 RX ORDER — OMEPRAZOLE 20 MG/1
20 CAPSULE, DELAYED RELEASE ORAL DAILY
Qty: 90 CAPSULE | Refills: 1 | Status: SHIPPED | OUTPATIENT
Start: 2018-02-05 | End: 2018-08-27 | Stop reason: ALTCHOICE

## 2018-02-28 ENCOUNTER — CARE COORDINATION (OUTPATIENT)
Dept: CARE COORDINATION | Age: 83
End: 2018-02-28

## 2018-03-16 ENCOUNTER — CARE COORDINATION (OUTPATIENT)
Dept: CARE COORDINATION | Age: 83
End: 2018-03-16

## 2018-03-16 NOTE — CARE COORDINATION
Ambulatory Care Coordination Note  3/16/2018  CM Risk Score: 0  Leigh Mortality Risk Score: 7.36    ACC: Robert Muñoz, RN    Summary Note: Phone call to patient. Reviewed with patient that she is due for vaccines, including Flu(this fall), pneumonia, DTap and shingles. Patient states she will not get the pneumonia, flu or shingles but she will get the Dtap. Informed patient that she can obtain vaccine at [facility] or the [facility]. Patient verbalizes understanding. Patient reports she stopped taking Zantac and Prilosec and she has not noticed any change. Patient states she is not going to take either medication as she does not feel they were helping her. Patient does not have an appt scheduled with PCP, but states she know she is due for her next appointment in May. Patient chooses to wait to call PCP in May because she does not know what her schedule will be. Patient denies having chest pain, racing heart rate, shortness of breath or dizziness. Patient states \"I feel great! 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 CC plan:  Patient has met all goals. Recommend graduation from care coordination. General Assessment    Do you have any symptoms that are causing concern?:  No           Care Coordination Interventions    Program Enrollment:  Rising Risk  Referral from Primary Care Provider:  No  Suggested Interventions and Community Resources  No Identified Needs         Goals Addressed     None          Prior to Admission medications    Medication Sig Start Date End Date Taking?  Authorizing Provider   omeprazole (PRILOSEC) 20 MG delayed release capsule Take 1 capsule by mouth daily 2/5/18   Altagracia Underwood NP   calcium carbonate 600 MG TABS tablet Take 1 tablet by mouth daily    Historical Provider, MD   Cholecalciferol (VITAMIN D3) 2000 units CAPS Take 1 capsule by mouth daily    Historical Provider, MD   ranitidine (ZANTAC) 150 MG tablet Take 1 tablet by mouth nightly as needed for Heartburn 12/11/17   Matt Hutchison NP   diltiazem (CARDIZEM) 30 MG tablet TAKE 1 TABLET BY MOUTH 2 TIMES DAILY 10/16/17   Matt Hutchison NP   NONFORMULARY     Historical Provider, MD   aspirin 81 MG tablet Take 81 mg by mouth daily    Historical Provider, MD   Nutritional Supplements (NUTRITIONAL DRINK) LIQD Take by mouth Indications: Boost drinks 1 a day. Historical Provider, MD   triamcinolone (KENALOG) 0.1 % cream Apply to the affected area 2 times a day. 6/17/16   Matt Hutchison NP   Multiple Vitamins-Minerals (THERAPEUTIC MULTIVITAMIN-MINERALS) tablet Take 1 tablet by mouth daily    Historical Provider, MD   Glucosamine 500 MG CAPS Take 1 tablet by mouth daily    Historical Provider, MD   Chondroitin Sulfate 400 MG CAPS Take 1 tablet by mouth daily    Historical Provider, MD       No future appointments.

## 2018-08-27 ENCOUNTER — OFFICE VISIT (OUTPATIENT)
Dept: FAMILY MEDICINE CLINIC | Age: 83
End: 2018-08-27
Payer: MEDICARE

## 2018-08-27 VITALS
WEIGHT: 131.2 LBS | OXYGEN SATURATION: 96 % | HEART RATE: 76 BPM | BODY MASS INDEX: 21.86 KG/M2 | SYSTOLIC BLOOD PRESSURE: 112 MMHG | DIASTOLIC BLOOD PRESSURE: 72 MMHG | HEIGHT: 65 IN

## 2018-08-27 DIAGNOSIS — Z13.220 SCREENING CHOLESTEROL LEVEL: ICD-10-CM

## 2018-08-27 DIAGNOSIS — E55.9 VITAMIN D DEFICIENCY: ICD-10-CM

## 2018-08-27 DIAGNOSIS — Z13.29 SCREENING FOR THYROID DISORDER: ICD-10-CM

## 2018-08-27 DIAGNOSIS — L40.9 PSORIASIS: ICD-10-CM

## 2018-08-27 DIAGNOSIS — I47.1 PAROXYSMAL SVT (SUPRAVENTRICULAR TACHYCARDIA) (HCC): Primary | ICD-10-CM

## 2018-08-27 DIAGNOSIS — R09.89 LEFT CAROTID BRUIT: ICD-10-CM

## 2018-08-27 DIAGNOSIS — L98.9 SKIN LESION OF NECK: ICD-10-CM

## 2018-08-27 DIAGNOSIS — I10 ESSENTIAL (PRIMARY) HYPERTENSION: ICD-10-CM

## 2018-08-27 DIAGNOSIS — Z13.0 SCREENING FOR DEFICIENCY ANEMIA: ICD-10-CM

## 2018-08-27 PROCEDURE — 1123F ACP DISCUSS/DSCN MKR DOCD: CPT | Performed by: NURSE PRACTITIONER

## 2018-08-27 PROCEDURE — 4040F PNEUMOC VAC/ADMIN/RCVD: CPT | Performed by: NURSE PRACTITIONER

## 2018-08-27 PROCEDURE — G8420 CALC BMI NORM PARAMETERS: HCPCS | Performed by: NURSE PRACTITIONER

## 2018-08-27 PROCEDURE — 99214 OFFICE O/P EST MOD 30 MIN: CPT | Performed by: NURSE PRACTITIONER

## 2018-08-27 PROCEDURE — G8427 DOCREV CUR MEDS BY ELIG CLIN: HCPCS | Performed by: NURSE PRACTITIONER

## 2018-08-27 PROCEDURE — 1090F PRES/ABSN URINE INCON ASSESS: CPT | Performed by: NURSE PRACTITIONER

## 2018-08-27 PROCEDURE — G8399 PT W/DXA RESULTS DOCUMENT: HCPCS | Performed by: NURSE PRACTITIONER

## 2018-08-27 PROCEDURE — 1036F TOBACCO NON-USER: CPT | Performed by: NURSE PRACTITIONER

## 2018-08-27 PROCEDURE — 1101F PT FALLS ASSESS-DOCD LE1/YR: CPT | Performed by: NURSE PRACTITIONER

## 2018-08-27 RX ORDER — TRIAMCINOLONE ACETONIDE 1 MG/G
CREAM TOPICAL
Qty: 45 G | Refills: 0 | Status: SHIPPED | OUTPATIENT
Start: 2018-08-27 | End: 2019-04-29 | Stop reason: ALTCHOICE

## 2018-08-27 ASSESSMENT — ENCOUNTER SYMPTOMS
EYES NEGATIVE: 1
DIARRHEA: 1
SHORTNESS OF BREATH: 0
CHEST TIGHTNESS: 0
ABDOMINAL DISTENTION: 0
SINUS PAIN: 0

## 2018-09-01 ENCOUNTER — HOSPITAL ENCOUNTER (OUTPATIENT)
Age: 83
Discharge: HOME | End: 2018-09-01
Payer: MEDICARE

## 2018-09-01 DIAGNOSIS — E55.9 VITAMIN D DEFICIENCY: ICD-10-CM

## 2018-09-01 DIAGNOSIS — Z13.0 SCREENING FOR DEFICIENCY ANEMIA: ICD-10-CM

## 2018-09-01 DIAGNOSIS — R09.89 LEFT CAROTID BRUIT: ICD-10-CM

## 2018-09-01 DIAGNOSIS — Z13.220 SCREENING CHOLESTEROL LEVEL: ICD-10-CM

## 2018-09-01 DIAGNOSIS — I10 ESSENTIAL (PRIMARY) HYPERTENSION: ICD-10-CM

## 2018-09-01 DIAGNOSIS — I47.1 PAROXYSMAL SVT (SUPRAVENTRICULAR TACHYCARDIA) (HCC): ICD-10-CM

## 2018-09-01 DIAGNOSIS — Z13.29 SCREENING FOR THYROID DISORDER: ICD-10-CM

## 2018-09-01 LAB
ABSOLUTE EOS #: 0.3 K/UL (ref 0–0.4)
ABSOLUTE IMMATURE GRANULOCYTE: NORMAL K/UL (ref 0–0.3)
ABSOLUTE LYMPH #: 1.6 K/UL (ref 1–4.8)
ABSOLUTE MONO #: 0.5 K/UL (ref 0–1)
ALBUMIN SERPL-MCNC: 4.1 G/DL (ref 3.5–5.2)
ALBUMIN/GLOBULIN RATIO: NORMAL (ref 1–2.5)
ALP BLD-CCNC: 61 U/L (ref 35–104)
ALT SERPL-CCNC: 16 U/L (ref 5–33)
ANION GAP SERPL CALCULATED.3IONS-SCNC: 11 MMOL/L (ref 9–17)
AST SERPL-CCNC: 29 U/L
BASOPHILS # BLD: 1 % (ref 0–2)
BASOPHILS ABSOLUTE: 0 K/UL (ref 0–0.2)
BILIRUB SERPL-MCNC: 0.86 MG/DL (ref 0.3–1.2)
BILIRUBIN DIRECT: <0.08 MG/DL
BILIRUBIN, INDIRECT: NORMAL MG/DL (ref 0–1)
BUN BLDV-MCNC: 17 MG/DL (ref 8–23)
BUN/CREAT BLD: 25 (ref 9–20)
CALCIUM SERPL-MCNC: 9.7 MG/DL (ref 8.6–10.4)
CHLORIDE BLD-SCNC: 104 MMOL/L (ref 98–107)
CHOLESTEROL/HDL RATIO: 2.8
CHOLESTEROL: 174 MG/DL
CO2: 27 MMOL/L (ref 20–31)
CREAT SERPL-MCNC: 0.69 MG/DL (ref 0.5–0.9)
DIFFERENTIAL TYPE: YES
EOSINOPHILS RELATIVE PERCENT: 5 % (ref 0–5)
GFR AFRICAN AMERICAN: >60 ML/MIN
GFR NON-AFRICAN AMERICAN: >60 ML/MIN
GFR SERPL CREATININE-BSD FRML MDRD: ABNORMAL ML/MIN/{1.73_M2}
GFR SERPL CREATININE-BSD FRML MDRD: ABNORMAL ML/MIN/{1.73_M2}
GLOBULIN: NORMAL G/DL (ref 1.5–3.8)
GLUCOSE BLD-MCNC: 91 MG/DL (ref 70–99)
HCT VFR BLD CALC: 40.8 % (ref 36–46)
HDLC SERPL-MCNC: 62 MG/DL
HEMOGLOBIN: 13.5 G/DL (ref 12–16)
IMMATURE GRANULOCYTES: NORMAL %
LDL CHOLESTEROL: 99 MG/DL (ref 0–130)
LYMPHOCYTES # BLD: 28 % (ref 15–40)
MCH RBC QN AUTO: 31.2 PG (ref 26–34)
MCHC RBC AUTO-ENTMCNC: 33.1 G/DL (ref 31–37)
MCV RBC AUTO: 94 FL (ref 80–100)
MONOCYTES # BLD: 8 % (ref 4–8)
NRBC AUTOMATED: NORMAL PER 100 WBC
PATIENT FASTING?: YES
PDW BLD-RTO: 13.8 % (ref 12.1–15.2)
PLATELET # BLD: 233 K/UL (ref 140–450)
PLATELET ESTIMATE: NORMAL
PMV BLD AUTO: NORMAL FL (ref 6–12)
POTASSIUM SERPL-SCNC: 3.9 MMOL/L (ref 3.7–5.3)
RBC # BLD: 4.34 M/UL (ref 4–5.2)
RBC # BLD: NORMAL 10*6/UL
SEG NEUTROPHILS: 58 % (ref 47–75)
SEGMENTED NEUTROPHILS ABSOLUTE COUNT: 3.4 K/UL (ref 2.5–7)
SODIUM BLD-SCNC: 142 MMOL/L (ref 135–144)
TOTAL PROTEIN: 6.9 G/DL (ref 6.4–8.3)
TRIGL SERPL-MCNC: 66 MG/DL
TSH SERPL DL<=0.05 MIU/L-ACNC: 2.77 MIU/L (ref 0.3–5)
VITAMIN D 25-HYDROXY: 69.4 NG/ML (ref 30–100)
VLDLC SERPL CALC-MCNC: NORMAL MG/DL (ref 1–30)
WBC # BLD: 5.8 K/UL (ref 3.5–11)
WBC # BLD: NORMAL 10*3/UL

## 2018-09-01 PROCEDURE — 80048 BASIC METABOLIC PNL TOTAL CA: CPT

## 2018-09-01 PROCEDURE — 84443 ASSAY THYROID STIM HORMONE: CPT

## 2018-09-01 PROCEDURE — 82306 VITAMIN D 25 HYDROXY: CPT

## 2018-09-01 PROCEDURE — 85025 COMPLETE CBC W/AUTO DIFF WBC: CPT

## 2018-09-01 PROCEDURE — 80061 LIPID PANEL: CPT

## 2018-09-01 PROCEDURE — 36415 COLL VENOUS BLD VENIPUNCTURE: CPT

## 2018-09-01 PROCEDURE — 80076 HEPATIC FUNCTION PANEL: CPT

## 2018-10-20 DIAGNOSIS — R00.0 TACHYCARDIA: ICD-10-CM

## 2019-04-29 ENCOUNTER — OFFICE VISIT (OUTPATIENT)
Dept: FAMILY MEDICINE CLINIC | Age: 84
End: 2019-04-29
Payer: MEDICARE

## 2019-04-29 VITALS
TEMPERATURE: 98.8 F | DIASTOLIC BLOOD PRESSURE: 72 MMHG | HEART RATE: 69 BPM | WEIGHT: 131.2 LBS | BODY MASS INDEX: 21.83 KG/M2 | OXYGEN SATURATION: 97 % | SYSTOLIC BLOOD PRESSURE: 112 MMHG

## 2019-04-29 DIAGNOSIS — Z13.220 SCREENING CHOLESTEROL LEVEL: ICD-10-CM

## 2019-04-29 DIAGNOSIS — R01.1 SYSTOLIC MURMUR: ICD-10-CM

## 2019-04-29 DIAGNOSIS — Z13.820 SCREENING FOR OSTEOPOROSIS: ICD-10-CM

## 2019-04-29 DIAGNOSIS — M85.89 OSTEOPENIA OF MULTIPLE SITES: ICD-10-CM

## 2019-04-29 DIAGNOSIS — I65.23 CAROTID STENOSIS, BILATERAL: ICD-10-CM

## 2019-04-29 DIAGNOSIS — I47.1 PAROXYSMAL SVT (SUPRAVENTRICULAR TACHYCARDIA) (HCC): Primary | ICD-10-CM

## 2019-04-29 DIAGNOSIS — R42 INTERMITTENT LIGHTHEADEDNESS: ICD-10-CM

## 2019-04-29 DIAGNOSIS — Z12.39 SCREENING FOR BREAST CANCER: ICD-10-CM

## 2019-04-29 PROCEDURE — 1123F ACP DISCUSS/DSCN MKR DOCD: CPT | Performed by: NURSE PRACTITIONER

## 2019-04-29 PROCEDURE — 1036F TOBACCO NON-USER: CPT | Performed by: NURSE PRACTITIONER

## 2019-04-29 PROCEDURE — 4040F PNEUMOC VAC/ADMIN/RCVD: CPT | Performed by: NURSE PRACTITIONER

## 2019-04-29 PROCEDURE — G8427 DOCREV CUR MEDS BY ELIG CLIN: HCPCS | Performed by: NURSE PRACTITIONER

## 2019-04-29 PROCEDURE — G8399 PT W/DXA RESULTS DOCUMENT: HCPCS | Performed by: NURSE PRACTITIONER

## 2019-04-29 PROCEDURE — 99214 OFFICE O/P EST MOD 30 MIN: CPT | Performed by: NURSE PRACTITIONER

## 2019-04-29 PROCEDURE — G8598 ASA/ANTIPLAT THER USED: HCPCS | Performed by: NURSE PRACTITIONER

## 2019-04-29 PROCEDURE — 1090F PRES/ABSN URINE INCON ASSESS: CPT | Performed by: NURSE PRACTITIONER

## 2019-04-29 PROCEDURE — G8420 CALC BMI NORM PARAMETERS: HCPCS | Performed by: NURSE PRACTITIONER

## 2019-04-29 ASSESSMENT — PATIENT HEALTH QUESTIONNAIRE - PHQ9
SUM OF ALL RESPONSES TO PHQ QUESTIONS 1-9: 0
SUM OF ALL RESPONSES TO PHQ QUESTIONS 1-9: 0
1. LITTLE INTEREST OR PLEASURE IN DOING THINGS: 0
SUM OF ALL RESPONSES TO PHQ9 QUESTIONS 1 & 2: 0
2. FEELING DOWN, DEPRESSED OR HOPELESS: 0

## 2019-04-29 ASSESSMENT — ENCOUNTER SYMPTOMS
SORE THROAT: 0
CHEST TIGHTNESS: 0
RHINORRHEA: 0

## 2019-04-29 NOTE — PATIENT INSTRUCTIONS
SURVEY:    You may be receiving a survey from [email or web address] regarding your visit today. Please complete the survey to enable us to provide the highest quality of care to you and your family. If you cannot score us a very good on any question, please call the office to discuss how we could have made your experience a very good one. Thank you.

## 2019-04-29 NOTE — PROGRESS NOTES
[address]  [address] [place] [id number]  Dept: [phone]  Dept Fax: [phone]    Last encounter 8/27/2018    HPI:   Jamir Quinn is a 82 y.o. female who presentstoday for her medical conditions/complaints as noted below. Jamir Quinn is c/o of Hypertension (and Paroxysmal SVT)      HPI    Mammogram never had one , okay for clinical breast exam today.  children, 10 children all adults. 26 grandchildren and 15 great grandchildren. PSVT history quit taking baby ASA in December due to stomach discomfort but restarted last week due to lightheaded some. Can feel pulse in neck at times. Diltiazem bid. Taking calcium and vitamin D3 daily. Vaccinations pneumonia and tetanus refused. (Dr. Khan Cornea)     Carotid artery disease known but limited and pt not taking statin per her choice. bp elevated with mowing lawn recently and grass was high so had to adjust mower a lot and she got  Wore out. Watching cholesterol shrimp 73% cholesterol usually eats once a week. 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 Walks them to bus stop twice a day 3 blocks.      Lab Results   Component Value Date     09/01/2018    K 3.9 09/01/2018     09/01/2018    CO2 27 09/01/2018    BUN 17 09/01/2018    CREATININE 0.69 09/01/2018    GLUCOSE 91 09/01/2018    CALCIUM 9.7 09/01/2018    PROT 6.9 09/01/2018    LABALBU 4.1 09/01/2018    BILITOT 0.86 09/01/2018    ALKPHOS 61 09/01/2018    AST 29 09/01/2018    ALT 16 09/01/2018    LABGLOM >60 09/01/2018    GFRAA >60 09/01/2018    GLOB NOT REPORTED 09/01/2018       Lab Results   Component Value Date    LDLCHOLESTEROL 99 09/01/2018         Past Medical History:   Diagnosis Date    Paroxysmal SVT (supraventricular tachycardia) ([address] 2017      Past Surgical History:   Procedure Laterality Date    CHOLECYSTECTOMY  1990    EYE SURGERY Right 8/24/15    cataract    EYE SURGERY Left 10/01/2015    cataract    OTHER SURGICAL HISTORY  03/2017    carotid eval Dr. Remigio Waterman with good results no significant stenosis but tortuous ICA no follow needed       Family History   Problem Relation Age of Onset    Cancer Father 71        esophagus    Other Brother 71        ALS    Heart Disease Brother           Social History     Tobacco Use    Smoking status: Passive Smoke Exposure - Never Smoker    Smokeless tobacco: Never Used   Substance Use Topics    Alcohol use: Not on file      Current Outpatient Medications   Medication Sig Dispense Refill    diltiazem (CARDIZEM) 30 MG tablet TAKE 1 TABLET BY MOUTH 2 TIMES DAILY 180 tablet 3    calcium carbonate 600 MG TABS tablet Take 1 tablet by mouth daily      Cholecalciferol (VITAMIN D3) 2000 units CAPS Take 1 capsule by mouth daily      NONFORMULARY       aspirin 81 MG tablet Take 81 mg by mouth daily      Nutritional Supplements (NUTRITIONAL DRINK) LIQD Take by mouth Indications: Boost drinks 1 a day.  Multiple Vitamins-Minerals (THERAPEUTIC MULTIVITAMIN-MINERALS) tablet Take 1 tablet by mouth daily      Glucosamine 500 MG CAPS Take 1 tablet by mouth daily      Chondroitin Sulfate 400 MG CAPS Take 1 tablet by mouth daily       No current facility-administered medications for this visit. No Known Allergies    Health Maintenance   Topic Date Due    DTaP/Tdap/Td vaccine (1 - Tdap) 04[birth year]/1956    Shingles Vaccine (1 of 2) 04/11/1987    Pneumococcal 65+ years Vaccine (2 of 2 - PCV13) 01/01/2013    Flu vaccine (Season Ended) 09/01/2019    DEXA (modify frequency per FRAX score)  Addressed       Subjective:      Review of Systems   Constitutional: Negative for activity change, chills and fever. HENT: Negative for congestion, rhinorrhea and sore throat. Respiratory: Negative for chest tightness. Endocrine: Negative for cold intolerance. Genitourinary: Negative for difficulty urinating.    Neurological: Positive for light-headedness. Negative for dizziness and headaches. Objective:     Physical Exam   Constitutional: She is oriented to person, place, and time. She appears well-developed and well-nourished. No distress. HENT:   Head: Normocephalic and atraumatic. Right Ear: External ear normal.   Left Ear: External ear normal.   Mouth/Throat: Oropharynx is clear and moist.   Eyes: Pupils are equal, round, and reactive to light. EOM are normal. No scleral icterus. Neck: Normal range of motion. Neck supple. No thyromegaly present. Cardiovascular: Normal rate and regular rhythm. Murmur (left sternal border 2/6 systolic click murmur) heard. No carotid bruit jyoti   Pulmonary/Chest: Effort normal and breath sounds normal. No respiratory distress. She has no wheezes. Right breast exhibits no inverted nipple, no mass, no nipple discharge and no skin change. Left breast exhibits no inverted nipple, no mass, no nipple discharge and no skin change. Abdominal: Soft. There is no tenderness. Musculoskeletal: Normal range of motion. Lymphadenopathy:     She has no cervical adenopathy. Neurological: She is alert and oriented to person, place, and time. Skin: Skin is warm and dry. Psychiatric: She has a normal mood and affect. Her behavior is normal. Judgment and thought content normal.   Nursing note and vitals reviewed.     /72 (Site: Right Upper Arm, Position: Sitting, Cuff Size: Medium Adult)   Pulse 69   Temp 98.8 °F (37.1 °C) (Oral)   Wt 131 lb 3.2 oz (59.5 kg)   SpO2 97%   BMI 21.83 kg/m²     Data:     Lab Results   Component Value Date     09/01/2018    K 3.9 09/01/2018     09/01/2018    CO2 27 09/01/2018    BUN 17 09/01/2018    CREATININE 0.69 09/01/2018    GLUCOSE 91 09/01/2018    PROT 6.9 09/01/2018    LABALBU 4.1 09/01/2018    BILITOT 0.86 09/01/2018    ALKPHOS 61 09/01/2018    AST 29 09/01/2018    ALT 16 09/01/2018     Lab Results   Component Value Date    WBC 5.8 09/01/2018 RBC 4.34 09/01/2018    HGB 13.5 09/01/2018    HCT 40.8 09/01/2018    MCV 94.0 09/01/2018    MCH 31.2 09/01/2018    MCHC 33.1 09/01/2018    RDW 13.8 09/01/2018     09/01/2018    MPV NOT REPORTED 09/01/2018     Lab Results   Component Value Date    TSH 2.77 09/01/2018     Lab Results   Component Value Date    CHOL 174 09/01/2018    HDL 62 09/01/2018          Assessment & Plan       1. Paroxysmal SVT (supraventricular tachycardia) (HCC)  On cardizem bid 30 mg dose may need to increase to 3 x daily with having bulging neck veins at times. - Echocardiogram complete; Future  - EKG 12 Lead; Future  - Comprehensive Metabolic Panel; Future    2. Carotid stenosis, bilateral  Stable  Hx angiogram in past with < 50% jyoti , asymptomatic unless bending over or extending neck. - Lipid Panel; Future  - Comprehensive Metabolic Panel; Future    3. Systolic murmur    - Echocardiogram complete; Future  - EKG 12 Lead; Future  - Comprehensive Metabolic Panel; Future    4. Osteopenia of multiple sites  Needs updated testing  - DEXA BONE DENSITY 2 SITES; Future    5. Intermittent lightheadedness  Likely SVT driven will do additional testing with new murmur    - Echocardiogram complete; Future  - EKG 12 Lead; Future  - Comprehensive Metabolic Panel; Future    6. Screening for breast cancer    - RUDDY SCREENING W CAD BILATERAL 2 VW; Future    7. Screening for osteoporosis    - DEXA BONE DENSITY 2 SITES; Future    8. Screening cholesterol level    - Lipid Panel; Future    See in 1 month for test results then in 6 months        Quality & Risk Score Accuracy    Visit Dx:  I47.1 - Paroxysmal SVT (supraventricular tachycardia) (HCC)  Assessment and plan:  Stable based upon symptoms and exam. Continue current treatment plan and follow up at least yearly.   Last edited 05/02/19 22:39 EDT by ANNA MARIE Alvarenga - RUI             Completed Refills   Requested Prescriptions      No prescriptions requested or ordered in this encounter     Return in about 6 months (around 10/29/2019). No orders of the defined types were placed in this encounter. Orders Placed This Encounter   Procedures    DEXA BONE DENSITY 2 SITES     Standing Status:   Future     Standing Expiration Date:   4/29/2020     Order Specific Question:   Reason for exam:     Answer:   osteopenia, screening osteoporosis    RUDDY SCREENING W CAD BILATERAL 2 VW     Standing Status:   Future     Standing Expiration Date:   10/29/2020     Order Specific Question:   Reason for exam:     Answer:   screening this is pt baseline mammogram never had one done.  Lipid Panel     Standing Status:   Future     Standing Expiration Date:   10/29/2020     Order Specific Question:   Is Patient Fasting?/# of Hours     Answer:   10    Comprehensive Metabolic Panel     Standing Status:   Future     Standing Expiration Date:   4/29/2020    EKG 12 Lead     Standing Status:   Future     Standing Expiration Date:   4/29/2020     Order Specific Question:   Reason for Exam?     Answer: Tachycardia     Comments:   lightheadedness    Echocardiogram complete     Standing Status:   Future     Standing Expiration Date:   7/29/2019     Order Specific Question:   Reason for exam:     Answer:   systolic murmur right sternal border. hx SVT. Radha Michaels received counseling on the following healthy behaviors: nutrition, exercise and medication adherence  Reviewed prior labs and health maintenance. Continue current medications, diet and exercise. Discussed use, benefit, and side effects of prescribed medications. Barriers to medication compliance addressed. Patient given educational materials - see patient instructions. All patient questions answered. Patient voiced understanding.           Electronically signed by ANNA MARIE Kendrick CNP on 5/2/2019 at 10:39 PM

## 2019-05-09 ENCOUNTER — HOSPITAL ENCOUNTER (OUTPATIENT)
Age: 84
Discharge: HOME | End: 2019-05-09
Payer: MEDICARE

## 2019-05-09 ENCOUNTER — HOSPITAL ENCOUNTER (OUTPATIENT)
Dept: NON INVASIVE DIAGNOSTICS | Age: 84
Discharge: HOME | End: 2019-05-09
Payer: MEDICARE

## 2019-05-09 ENCOUNTER — HOSPITAL ENCOUNTER (OUTPATIENT)
Dept: MAMMOGRAPHY | Age: 84
Discharge: HOME | End: 2019-05-11
Payer: MEDICARE

## 2019-05-09 DIAGNOSIS — R42 INTERMITTENT LIGHTHEADEDNESS: ICD-10-CM

## 2019-05-09 DIAGNOSIS — Z13.220 SCREENING CHOLESTEROL LEVEL: ICD-10-CM

## 2019-05-09 DIAGNOSIS — I47.1 PAROXYSMAL SVT (SUPRAVENTRICULAR TACHYCARDIA) (HCC): ICD-10-CM

## 2019-05-09 DIAGNOSIS — R01.1 SYSTOLIC MURMUR: ICD-10-CM

## 2019-05-09 DIAGNOSIS — I65.23 CAROTID STENOSIS, BILATERAL: ICD-10-CM

## 2019-05-09 DIAGNOSIS — Z12.39 SCREENING FOR BREAST CANCER: ICD-10-CM

## 2019-05-09 LAB
ALBUMIN SERPL-MCNC: 4.8 G/DL (ref 3.5–5.2)
ALBUMIN/GLOBULIN RATIO: NORMAL (ref 1–2.5)
ALP BLD-CCNC: 70 U/L (ref 35–104)
ALT SERPL-CCNC: 12 U/L (ref 5–33)
ANION GAP SERPL CALCULATED.3IONS-SCNC: 11 MMOL/L (ref 9–17)
AST SERPL-CCNC: 19 U/L
BILIRUB SERPL-MCNC: 0.94 MG/DL (ref 0.3–1.2)
BUN BLDV-MCNC: 15 MG/DL (ref 8–23)
BUN/CREAT BLD: 20 (ref 9–20)
CALCIUM SERPL-MCNC: 9.4 MG/DL (ref 8.6–10.4)
CHLORIDE BLD-SCNC: 104 MMOL/L (ref 98–107)
CHOLESTEROL/HDL RATIO: 2.6
CHOLESTEROL: 174 MG/DL
CO2: 27 MMOL/L (ref 20–31)
CREAT SERPL-MCNC: 0.75 MG/DL (ref 0.5–0.9)
GFR AFRICAN AMERICAN: >60 ML/MIN
GFR NON-AFRICAN AMERICAN: >60 ML/MIN
GFR SERPL CREATININE-BSD FRML MDRD: NORMAL ML/MIN/{1.73_M2}
GFR SERPL CREATININE-BSD FRML MDRD: NORMAL ML/MIN/{1.73_M2}
GLUCOSE BLD-MCNC: 97 MG/DL (ref 70–99)
HDLC SERPL-MCNC: 66 MG/DL
LDL CHOLESTEROL: 96 MG/DL (ref 0–130)
LV EF: 60 %
LVEF MODALITY: NORMAL
PATIENT FASTING?: YES
POTASSIUM SERPL-SCNC: 4.3 MMOL/L (ref 3.7–5.3)
SODIUM BLD-SCNC: 142 MMOL/L (ref 135–144)
TOTAL PROTEIN: 7.6 G/DL (ref 6.4–8.3)
TRIGL SERPL-MCNC: 61 MG/DL
VLDLC SERPL CALC-MCNC: NORMAL MG/DL (ref 1–30)

## 2019-05-09 PROCEDURE — 77067 SCR MAMMO BI INCL CAD: CPT

## 2019-05-09 PROCEDURE — 80053 COMPREHEN METABOLIC PANEL: CPT

## 2019-05-09 PROCEDURE — 36415 COLL VENOUS BLD VENIPUNCTURE: CPT

## 2019-05-09 PROCEDURE — 93005 ELECTROCARDIOGRAM TRACING: CPT

## 2019-05-09 PROCEDURE — 93306 TTE W/DOPPLER COMPLETE: CPT

## 2019-05-09 PROCEDURE — 80061 LIPID PANEL: CPT

## 2019-05-10 LAB
EKG ATRIAL RATE: 65 BPM
EKG P AXIS: 65 DEGREES
EKG P-R INTERVAL: 158 MS
EKG Q-T INTERVAL: 414 MS
EKG QRS DURATION: 90 MS
EKG QTC CALCULATION (BAZETT): 430 MS
EKG R AXIS: 36 DEGREES
EKG T AXIS: 69 DEGREES
EKG VENTRICULAR RATE: 65 BPM

## 2019-05-12 DIAGNOSIS — Z82.49 FAMILY HISTORY OF HEART DISEASE: ICD-10-CM

## 2019-05-12 DIAGNOSIS — I11.9 LVH (LEFT VENTRICULAR HYPERTROPHY) DUE TO HYPERTENSIVE DISEASE, WITHOUT HEART FAILURE: ICD-10-CM

## 2019-05-12 DIAGNOSIS — I47.1 PAROXYSMAL SVT (SUPRAVENTRICULAR TACHYCARDIA) (HCC): Primary | ICD-10-CM

## 2019-05-12 DIAGNOSIS — R94.31 ABNORMAL EKG: ICD-10-CM

## 2019-05-12 RX ORDER — LISINOPRIL 2.5 MG/1
2.5 TABLET ORAL DAILY
Qty: 30 TABLET | Refills: 1 | Status: SHIPPED | OUTPATIENT
Start: 2019-05-12 | End: 2019-06-11 | Stop reason: SDUPTHER

## 2019-05-14 ENCOUNTER — TELEPHONE (OUTPATIENT)
Dept: CARDIOLOGY CLINIC | Age: 84
End: 2019-05-14

## 2019-05-14 DIAGNOSIS — I47.1 PAROXYSMAL SUPRAVENTRICULAR TACHYCARDIA (HCC): Primary | ICD-10-CM

## 2019-05-14 DIAGNOSIS — E55.9 VITAMIN D DEFICIENCY DISEASE: ICD-10-CM

## 2019-05-14 DIAGNOSIS — Z82.49 FAMILY HISTORY OF ISCHEMIC HEART DISEASE: ICD-10-CM

## 2019-05-14 DIAGNOSIS — R94.31 NONSPECIFIC ABNORMAL ELECTROCARDIOGRAM (ECG) (EKG): ICD-10-CM

## 2019-05-14 DIAGNOSIS — I11.9 MALIGNANT HYPERTENSIVE HEART DISEASE WITHOUT HEART FAILURE: ICD-10-CM

## 2019-05-18 ENCOUNTER — HOSPITAL ENCOUNTER (OUTPATIENT)
Age: 84
Discharge: HOME | End: 2019-05-20
Payer: MEDICARE

## 2019-05-18 ENCOUNTER — HOSPITAL ENCOUNTER (OUTPATIENT)
Age: 84
Discharge: HOME | End: 2019-05-18
Payer: MEDICARE

## 2019-05-18 ENCOUNTER — HOSPITAL ENCOUNTER (OUTPATIENT)
Dept: GENERAL RADIOLOGY | Age: 84
Discharge: HOME | End: 2019-05-20
Payer: MEDICARE

## 2019-05-18 DIAGNOSIS — I11.9 MALIGNANT HYPERTENSIVE HEART DISEASE WITHOUT HEART FAILURE: ICD-10-CM

## 2019-05-18 DIAGNOSIS — I47.1 PAROXYSMAL SUPRAVENTRICULAR TACHYCARDIA (HCC): ICD-10-CM

## 2019-05-18 DIAGNOSIS — R94.31 NONSPECIFIC ABNORMAL ELECTROCARDIOGRAM (ECG) (EKG): ICD-10-CM

## 2019-05-18 DIAGNOSIS — E55.9 VITAMIN D DEFICIENCY DISEASE: ICD-10-CM

## 2019-05-18 DIAGNOSIS — Z82.49 FAMILY HISTORY OF ISCHEMIC HEART DISEASE: ICD-10-CM

## 2019-05-18 LAB
ABSOLUTE EOS #: 0.1 K/UL (ref 0–0.4)
ABSOLUTE IMMATURE GRANULOCYTE: NORMAL K/UL (ref 0–0.3)
ABSOLUTE LYMPH #: 1.1 K/UL (ref 1–4.8)
ABSOLUTE MONO #: 0.4 K/UL (ref 0–1)
BASOPHILS # BLD: 1 % (ref 0–2)
BASOPHILS ABSOLUTE: 0 K/UL (ref 0–0.2)
DIFFERENTIAL TYPE: YES
EOSINOPHILS RELATIVE PERCENT: 2 % (ref 0–5)
HCT VFR BLD CALC: 38.4 % (ref 36–46)
HEMOGLOBIN: 12.9 G/DL (ref 12–16)
IMMATURE GRANULOCYTES: NORMAL %
LYMPHOCYTES # BLD: 22 % (ref 15–40)
MCH RBC QN AUTO: 31.5 PG (ref 26–34)
MCHC RBC AUTO-ENTMCNC: 33.7 G/DL (ref 31–37)
MCV RBC AUTO: 93.5 FL (ref 80–100)
MONOCYTES # BLD: 8 % (ref 4–8)
NRBC AUTOMATED: NORMAL PER 100 WBC
PDW BLD-RTO: 13.3 % (ref 12.1–15.2)
PLATELET # BLD: 213 K/UL (ref 140–450)
PLATELET ESTIMATE: NORMAL
PMV BLD AUTO: NORMAL FL (ref 6–12)
RBC # BLD: 4.1 M/UL (ref 4–5.2)
RBC # BLD: NORMAL 10*6/UL
SEG NEUTROPHILS: 67 % (ref 47–75)
SEGMENTED NEUTROPHILS ABSOLUTE COUNT: 3.4 K/UL (ref 2.5–7)
TSH SERPL DL<=0.05 MIU/L-ACNC: 2.21 MIU/L (ref 0.3–5)
VITAMIN D 25-HYDROXY: 71.6 NG/ML (ref 30–100)
WBC # BLD: 5 K/UL (ref 3.5–11)
WBC # BLD: NORMAL 10*3/UL

## 2019-05-18 PROCEDURE — 36415 COLL VENOUS BLD VENIPUNCTURE: CPT

## 2019-05-18 PROCEDURE — 71046 X-RAY EXAM CHEST 2 VIEWS: CPT

## 2019-05-18 PROCEDURE — 85025 COMPLETE CBC W/AUTO DIFF WBC: CPT

## 2019-05-18 PROCEDURE — 82306 VITAMIN D 25 HYDROXY: CPT

## 2019-05-18 PROCEDURE — 84443 ASSAY THYROID STIM HORMONE: CPT

## 2019-06-10 ENCOUNTER — OFFICE VISIT (OUTPATIENT)
Dept: FAMILY MEDICINE CLINIC | Age: 84
End: 2019-06-10
Payer: MEDICARE

## 2019-06-10 VITALS
OXYGEN SATURATION: 94 % | DIASTOLIC BLOOD PRESSURE: 54 MMHG | WEIGHT: 129.2 LBS | TEMPERATURE: 98.1 F | BODY MASS INDEX: 21.5 KG/M2 | SYSTOLIC BLOOD PRESSURE: 110 MMHG | HEART RATE: 71 BPM

## 2019-06-10 DIAGNOSIS — I47.1 PAROXYSMAL SVT (SUPRAVENTRICULAR TACHYCARDIA) (HCC): Primary | ICD-10-CM

## 2019-06-10 DIAGNOSIS — R94.31 ABNORMAL EKG: ICD-10-CM

## 2019-06-10 DIAGNOSIS — R42 INTERMITTENT LIGHTHEADEDNESS: ICD-10-CM

## 2019-06-10 DIAGNOSIS — R01.1 SYSTOLIC MURMUR: ICD-10-CM

## 2019-06-10 PROCEDURE — G8399 PT W/DXA RESULTS DOCUMENT: HCPCS | Performed by: NURSE PRACTITIONER

## 2019-06-10 PROCEDURE — 1036F TOBACCO NON-USER: CPT | Performed by: NURSE PRACTITIONER

## 2019-06-10 PROCEDURE — G8598 ASA/ANTIPLAT THER USED: HCPCS | Performed by: NURSE PRACTITIONER

## 2019-06-10 PROCEDURE — G8427 DOCREV CUR MEDS BY ELIG CLIN: HCPCS | Performed by: NURSE PRACTITIONER

## 2019-06-10 PROCEDURE — 99213 OFFICE O/P EST LOW 20 MIN: CPT | Performed by: NURSE PRACTITIONER

## 2019-06-10 PROCEDURE — 4040F PNEUMOC VAC/ADMIN/RCVD: CPT | Performed by: NURSE PRACTITIONER

## 2019-06-10 PROCEDURE — 1090F PRES/ABSN URINE INCON ASSESS: CPT | Performed by: NURSE PRACTITIONER

## 2019-06-10 PROCEDURE — 1123F ACP DISCUSS/DSCN MKR DOCD: CPT | Performed by: NURSE PRACTITIONER

## 2019-06-10 PROCEDURE — G8420 CALC BMI NORM PARAMETERS: HCPCS | Performed by: NURSE PRACTITIONER

## 2019-06-10 NOTE — PATIENT INSTRUCTIONS
SURVEY:    You may be receiving a survey from [employer] regarding your visit today. Please complete the survey to enable us to provide the highest quality of care to you and your family. If you cannot score us a very good on any question, please call the office to discuss how we could have made your experience a very good one. Thank you.

## 2019-06-11 ENCOUNTER — OFFICE VISIT (OUTPATIENT)
Dept: CARDIOLOGY CLINIC | Age: 84
End: 2019-06-11
Payer: MEDICARE

## 2019-06-11 VITALS
DIASTOLIC BLOOD PRESSURE: 70 MMHG | HEART RATE: 80 BPM | OXYGEN SATURATION: 97 % | SYSTOLIC BLOOD PRESSURE: 122 MMHG | WEIGHT: 129 LBS | BODY MASS INDEX: 21.47 KG/M2

## 2019-06-11 DIAGNOSIS — R94.31 NONSPECIFIC ABNORMAL ELECTROCARDIOGRAM (ECG) (EKG): ICD-10-CM

## 2019-06-11 DIAGNOSIS — I11.9 MALIGNANT HYPERTENSIVE HEART DISEASE WITHOUT HEART FAILURE: ICD-10-CM

## 2019-06-11 DIAGNOSIS — I11.9 LVH (LEFT VENTRICULAR HYPERTROPHY) DUE TO HYPERTENSIVE DISEASE, WITHOUT HEART FAILURE: ICD-10-CM

## 2019-06-11 DIAGNOSIS — I47.1 PAROXYSMAL SUPRAVENTRICULAR TACHYCARDIA (HCC): Primary | ICD-10-CM

## 2019-06-11 PROCEDURE — 1090F PRES/ABSN URINE INCON ASSESS: CPT | Performed by: INTERNAL MEDICINE

## 2019-06-11 PROCEDURE — 1036F TOBACCO NON-USER: CPT | Performed by: INTERNAL MEDICINE

## 2019-06-11 PROCEDURE — 4040F PNEUMOC VAC/ADMIN/RCVD: CPT | Performed by: INTERNAL MEDICINE

## 2019-06-11 PROCEDURE — G8420 CALC BMI NORM PARAMETERS: HCPCS | Performed by: INTERNAL MEDICINE

## 2019-06-11 PROCEDURE — G8399 PT W/DXA RESULTS DOCUMENT: HCPCS | Performed by: INTERNAL MEDICINE

## 2019-06-11 PROCEDURE — 99204 OFFICE O/P NEW MOD 45 MIN: CPT | Performed by: INTERNAL MEDICINE

## 2019-06-11 PROCEDURE — 1123F ACP DISCUSS/DSCN MKR DOCD: CPT | Performed by: INTERNAL MEDICINE

## 2019-06-11 PROCEDURE — G8598 ASA/ANTIPLAT THER USED: HCPCS | Performed by: INTERNAL MEDICINE

## 2019-06-11 PROCEDURE — G8427 DOCREV CUR MEDS BY ELIG CLIN: HCPCS | Performed by: INTERNAL MEDICINE

## 2019-06-11 RX ORDER — LISINOPRIL 2.5 MG/1
2.5 TABLET ORAL DAILY
Qty: 90 TABLET | Refills: 1 | Status: SHIPPED | OUTPATIENT
Start: 2019-06-11 | End: 2019-07-10 | Stop reason: SDUPTHER

## 2019-06-11 NOTE — PATIENT INSTRUCTIONS
Will set up for lexiscan stress test/event monitor  Will call with results-if ok will follow up as needed

## 2019-06-11 NOTE — LETTER
David Mayo M.D. [address] [place] [address]  [phone]        2019        Eunice Mandel, CNP  [address] [facility] [address]    RE:   Vishal Leblanc  :  1935    Dear Dagoberto Vaz:    CHIEF COMPLAINT:  1.  Palpitations with a history of SVT. 2.  Mild-to-moderate mitral regurgitation. 3.  Long history of dizziness. 4.  Abnormal EKG. HISTORY OF PRESENT ILLNESS:  I had the pleasure of seeing Mrs. Lexy Galindo in our office on 2019. She is a very pleasant 29-year-old female who is somewhat of a poor historian. However, she has a history of SVT. She has a long history of dizziness. She has had a very nice workup for dizziness. She has not had any recent syncopal episodes. However, if she bends over and stands up, she will be dizzy. Also, if she lies flat in bed, she will become dizzy. Occasionally the room spins and occasionally she just feels \"out of kilter. \"  She did have a left carotid bruit and saw Dr. Deepak Lombardi in [place] in 2017 and he felt that there was no significant carotid artery disease. She does have a history of tachycardia. She had a Holter monitor in 2017. This showed normal sinus rhythm throughout the recording with many PVCs with occasional short bursts of SVT at a rather low heart rate at 120 to 130 range. Longest run was 22 beats. .  She has been treated with Cardizem low dose at 30 mg b.i.d. and she has done well since that time. She remains active. She lives alone but does her own yard work including mowing her yard. (I told her I was looking for somebody to mow my yard but she said she was not interested. ..). She has occasional chest pain, not necessarily related to activity. She has had no PND or orthopnea. She continues to be dizzy when she bends over and stands up rapidly.   She will notice her heart occasionally beating more rapidly at that same time. She does not remember having a sustained tachycardia. Her daughter, Nila Camacho, was with her and she relayed that she has noted some shortness of breath. I do not believe that she has ever had a cardiac stress test and she has never had a cardiac catheterization. She did have an echocardiogram on 2019, that showed mild-to-moderate mitral regurgitation with normal ejection fraction of 60%. CARDIAC RISK FACTORS:  Prior MI:  Negative. Hypertension:  Positive. Hyperlipidemia:  Negative. Other Family Members:  Positive. Smoking:  Negative. Diabetes:  Negative. MEDICATIONS AT HOME:  She is currently on calcium carbonate 600 mg daily, vitamin D 2000 units daily, Cardizem 30 mg b.i.d., lisinopril 2.5 mg daily, multivitamins daily. PAST MEDICAL HISTORY:  1. She has a history of SVT documented on Holter monitor in 2017.  2.  She is status post cataract surgery on the right side on 2015, and on the left side on 10/01/2015. 3.  Cholecystectomy in . FAMILY HISTORY:  Had an uncle with a myocardial infarction. Father  from esophageal cancer. Mother lived to be a 80. Brother passed away of ALS. Another brother is alive and oldest brother had 3 MIs. SOCIAL HISTORY:  She is 82years old, has 10 children. Her daughter, Nila Camacho, was with her today. She has 26 grandchildren and 15 to 15 great-grandchildren (not sure and does not really care. ..). 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 Her  was an . She does not smoke, does not drink alcohol. Lives alone. Again, remains active, does her own yard work. REVIEW OF SYSTEMS:  Cardiac as above.   Other systems reviewed including constitutional, eyes, ears, nose and throat, cardiovascular, respiratory, GI, , musculoskeletal, integumentary, neurologic, psychiatric, endocrine, hematologic and allergic/immunologic and are negative except for what is described above. No weight loss or weight gain. No change in bowel habits, no blood in stool. No fevers, sweats chills. PHYSICAL EXAMINATION:  VITAL SIGNS:  Her blood pressure was 122/70 with a heart rate of 80 and regular. Respiratory rate 18. O2 sat 97%. Weight 129 pounds. GENERAL:  She is a pleasant 53-year-old female accompanied by her daughter, Cheli Altamirano. She had some difficulty with her memory and Cheli Altamirano helped her with her history. She was in no distress. SKIN:  No unusual skin changes. HEENT:  The pupils are equally round and reactive to light and accommodation. Extraocular movements were intact. Mucous membranes were dry. NECK:  No JVD. Good carotid pulses. She had mild right-sided bruit. No lymphadenopathy or thyromegaly. CARDIOVASCULAR EXAM:  S1 and S2 were normal.  No S3 or S4. She had a grade 3/6 systolic blowing type at the apex. No diastolic murmur. PMI was normal.  No lift, thrust, or pericardial friction rub. LUNGS:  Quite clear to auscultation and percussion. ABDOMEN:  Soft and nontender. Good bowel sounds. The aorta was not enlarged. No hepatomegaly, splenomegaly. EXTREMITIES:  Good femoral pulses. Good pedal pulses. No pedal edema. Skin was warm and dry. No calf tenderness. Nail beds pink. Good cap refill. PULSES:  Bilateral symmetrical radial, brachial and carotid pulses with a soft right carotid bruit. Good femoral and pedal pulses. NEUROLOGIC EXAM:  Within normal limits. PSYCHIATRIC EXAM:  Unremarkable. LABORATORY DATA:  From 05/09/2019, sodium 142, potassium 4.3, BUN 15, creatinine 0.75, GFR greater than 60, glucose 97, calcium 9.4. Cholesterol 174 with an HDL 66, LDL 96, triglycerides 61. ALT was 12, AST was 19. White count was 5.0, hemoglobin 12.9 with a platelet count 229,453. Vitamin D was 71.6.      EKG showed normal sinus rhythm, possible old anterior myocardial infarction, left atrial enlargement. Chest x-ray was unremarkable. IMPRESSION:  1. Abnormal EKG. 2.  History of SVT documented on Holter monitor on 09/21/2017, where her rate ranged from 120 to 157 with short bursts of SVT. 3.  Long history of dizziness with very mild carotid artery disease, less than 20%, on carotid ultrasound in 04/2017, with her being seen by Dr. Barbara David in [place]. 4.  Hypertension, well controlled. 5.  Family history of coronary artery disease. 6.  Dizziness, which is noncardiac related. 7.  Mild shortness of breath. 8.  Mild-to-moderate mitral regurgitation with normal LV function, EF of 60%. PLAN:  1.  A 30-day event recorder. 2.  Lexiscan Cardiolite stress test.  3.  If the tests are unremarkable, we will call her with the results. If there is any significant abnormality then would bring her back to go over the results. 4.  If tests were unremarkable then I will see her as needed. I would recommend a repeat echocardiogram in 1 to 2 years to follow her mitral regurgitation. DISCUSSION:  Mrs. Ronald Emanuel overall is doing relatively well. She does have mild-to-moderate mitral regurgitation in the abnormal EKG. She has a long history of dizziness, which does not sound that this is cardiac related. She, however, does have a history of SVT via Holter monitor in 2017. Therefore, I think it is reasonable to do a 30-day event recorder to see how much arrhythmia she is experiencing and to make sure there is no correlation with her SVT in any dizziness episodes. Of note, during her Holter monitor, the rate was relatively slow, being usually between 120 to 130 beats per minute with several episodes at 157 beats per minute. Her EKG is fairly abnormal.  I think it is reasonable to do a Lexiscan Cardiolite stress test to make sure she has no underlying coronary artery disease.     Again, if her tests are unremarkable, we will call her with the results

## 2019-06-13 ENCOUNTER — HOSPITAL ENCOUNTER (OUTPATIENT)
Dept: NUCLEAR MEDICINE | Age: 84
Discharge: HOME | End: 2019-06-15
Payer: MEDICARE

## 2019-06-13 ENCOUNTER — HOSPITAL ENCOUNTER (OUTPATIENT)
Dept: NON INVASIVE DIAGNOSTICS | Age: 84
Discharge: HOME | End: 2019-06-13
Payer: MEDICARE

## 2019-06-13 VITALS — DIASTOLIC BLOOD PRESSURE: 52 MMHG | HEART RATE: 80 BPM | SYSTOLIC BLOOD PRESSURE: 100 MMHG

## 2019-06-13 DIAGNOSIS — R94.31 NONSPECIFIC ABNORMAL ELECTROCARDIOGRAM (ECG) (EKG): ICD-10-CM

## 2019-06-13 DIAGNOSIS — I11.9 MALIGNANT HYPERTENSIVE HEART DISEASE WITHOUT HEART FAILURE: ICD-10-CM

## 2019-06-13 DIAGNOSIS — I47.1 PAROXYSMAL SUPRAVENTRICULAR TACHYCARDIA (HCC): ICD-10-CM

## 2019-06-13 PROCEDURE — 3430000000 HC RX DIAGNOSTIC RADIOPHARMACEUTICAL: Performed by: INTERNAL MEDICINE

## 2019-06-13 PROCEDURE — 93270 REMOTE 30 DAY ECG REV/REPORT: CPT

## 2019-06-13 PROCEDURE — A9500 TC99M SESTAMIBI: HCPCS | Performed by: INTERNAL MEDICINE

## 2019-06-13 PROCEDURE — 93017 CV STRESS TEST TRACING ONLY: CPT

## 2019-06-13 PROCEDURE — 78452 HT MUSCLE IMAGE SPECT MULT: CPT

## 2019-06-13 PROCEDURE — 6360000002 HC RX W HCPCS: Performed by: INTERNAL MEDICINE

## 2019-06-13 PROCEDURE — 2580000003 HC RX 258: Performed by: INTERNAL MEDICINE

## 2019-06-13 RX ORDER — 0.9 % SODIUM CHLORIDE 0.9 %
10 VIAL (ML) INJECTION PRN
Status: DISCONTINUED | OUTPATIENT
Start: 2019-06-13 | End: 2019-06-14 | Stop reason: HOSPADM

## 2019-06-13 RX ORDER — AMINOPHYLLINE DIHYDRATE 25 MG/ML
100 INJECTION, SOLUTION INTRAVENOUS
Status: DISCONTINUED | OUTPATIENT
Start: 2019-06-13 | End: 2019-06-13 | Stop reason: HOSPADM

## 2019-06-13 RX ORDER — AMINOPHYLLINE DIHYDRATE 25 MG/ML
50 INJECTION, SOLUTION INTRAVENOUS
Status: DISCONTINUED | OUTPATIENT
Start: 2019-06-13 | End: 2019-06-13 | Stop reason: HOSPADM

## 2019-06-13 RX ADMIN — REGADENOSON 0.4 MG: 0.08 INJECTION, SOLUTION INTRAVENOUS at 08:54

## 2019-06-13 RX ADMIN — TETRAKIS(2-METHOXYISOBUTYLISOCYANIDE)COPPER(I) TETRAFLUOROBORATE 32.7 MILLICURIE: 1 INJECTION, POWDER, LYOPHILIZED, FOR SOLUTION INTRAVENOUS at 09:00

## 2019-06-13 RX ADMIN — TETRAKIS(2-METHOXYISOBUTYLISOCYANIDE)COPPER(I) TETRAFLUOROBORATE 11.3 MILLICURIE: 1 INJECTION, POWDER, LYOPHILIZED, FOR SOLUTION INTRAVENOUS at 07:50

## 2019-06-13 RX ADMIN — Medication 10 ML: at 08:54

## 2019-06-13 NOTE — PROGRESS NOTES
The patient was educated on the use of an event monitor. The patient's comprehension was medium. The patient was able to verbalize recall. The patient was instructed on how and when to return the monitor.

## 2019-06-13 NOTE — PROGRESS NOTES
Radha Coyle M.D. [address] [place] [address]  [phone]        2019        Sushma Rabago, CNP  [address] [facility] 82    RE:   Yumiko Jose Enrique  :  1935    Dear David Veras:    CHIEF COMPLAINT:  1.  Palpitations with a history of SVT. 2.  Mild-to-moderate mitral regurgitation. 3.  Long history of dizziness. 4.  Abnormal EKG. HISTORY OF PRESENT ILLNESS:  I had the pleasure of seeing Mrs. Raymon Villareal in our office on 2019. She is a very pleasant 82-year-old female who is somewhat of a poor historian. However, she has a history of SVT. She has a long history of dizziness. She has had a very nice workup for dizziness. She has not had any recent syncopal episodes. However, if she bends over and stands up, she will be dizzy. Also, if she lies flat in bed, she will become dizzy. Occasionally the room spins and occasionally she just feels \"out of kilter. \"  She did have a left carotid bruit and saw Dr. Griselda Regan in [place] in 2017 and he felt that there was no significant carotid artery disease. She does have a history of tachycardia. She had a Holter monitor in 2017. This showed normal sinus rhythm throughout the recording with many PVCs with occasional short bursts of SVT at a rather low heart rate at 120 to 130 range. Longest run was 22 beats. .  She has been treated with Cardizem low dose at 30 mg b.i.d. and she has done well since that time. She remains active. She lives alone but does her own yard work including mowing her yard. (I told her I was looking for somebody to mow my yard but she said she was not interested. ..). She has occasional chest pain, not necessarily related to activity. She has had no PND or orthopnea. She continues to be dizzy when she bends over and stands up rapidly. She will notice her heart occasionally beating more rapidly at that same time.   She does not remember having a sustained tachycardia. Her daughter, Cheli Altamirano, was with her and she relayed that she has noted some shortness of breath. I do not believe that she has ever had a cardiac stress test and she has never had a cardiac catheterization. She did have an echocardiogram on 2019, that showed mild-to-moderate mitral regurgitation with normal ejection fraction of 60%. CARDIAC RISK FACTORS:  Prior MI:  Negative. Hypertension:  Positive. Hyperlipidemia:  Negative. Other Family Members:  Positive. Smoking:  Negative. Diabetes:  Negative. MEDICATIONS AT HOME:  She is currently on calcium carbonate 600 mg daily, vitamin D 2000 units daily, Cardizem 30 mg b.i.d., lisinopril 2.5 mg daily, multivitamins daily. PAST MEDICAL HISTORY:  1. She has a history of SVT documented on Holter monitor in 2017.  2.  She is status post cataract surgery on the right side on 2015, and on the left side on 10/01/2015. 3.  Cholecystectomy in . FAMILY HISTORY:  Had an uncle with a myocardial infarction. Father  from esophageal cancer. Mother lived to be a 80. Brother passed away of ALS. Another brother is alive and oldest brother had 3 MIs. SOCIAL HISTORY:  She is 82years old, has 10 children. Her daughter, Cheli Altamirano, was with her today. She has 26 grandchildren and 15 to 15 great-grandchildren (not sure and does not really care. ..). 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 Her  was an . She does not smoke, does not drink alcohol. Lives alone. Again, remains active, does her own yard work. REVIEW OF SYSTEMS:  Cardiac as above.   Other systems reviewed including constitutional, eyes, ears, nose and throat, cardiovascular, respiratory, GI, , musculoskeletal, integumentary, neurologic, psychiatric, endocrine, hematologic and allergic/immunologic and are negative except for what is described above. No weight loss or weight gain. No change in bowel habits, no blood in stool. No fevers, sweats chills. PHYSICAL EXAMINATION:  VITAL SIGNS:  Her blood pressure was 122/70 with a heart rate of 80 and regular. Respiratory rate 18. O2 sat 97%. Weight 129 pounds. GENERAL:  She is a pleasant 82-year-old female accompanied by her daughter, Vandana Frank. She had some difficulty with her memory and Vandana Frank helped her with her history. She was in no distress. SKIN:  No unusual skin changes. HEENT:  The pupils are equally round and reactive to light and accommodation. Extraocular movements were intact. Mucous membranes were dry. NECK:  No JVD. Good carotid pulses. She had mild right-sided bruit. No lymphadenopathy or thyromegaly. CARDIOVASCULAR EXAM:  S1 and S2 were normal.  No S3 or S4. She had a grade 3/6 systolic blowing type at the apex. No diastolic murmur. PMI was normal.  No lift, thrust, or pericardial friction rub. LUNGS:  Quite clear to auscultation and percussion. ABDOMEN:  Soft and nontender. Good bowel sounds. The aorta was not enlarged. No hepatomegaly, splenomegaly. EXTREMITIES:  Good femoral pulses. Good pedal pulses. No pedal edema. Skin was warm and dry. No calf tenderness. Nail beds pink. Good cap refill. PULSES:  Bilateral symmetrical radial, brachial and carotid pulses with a soft right carotid bruit. Good femoral and pedal pulses. NEUROLOGIC EXAM:  Within normal limits. PSYCHIATRIC EXAM:  Unremarkable. LABORATORY DATA:  From 05/09/2019, sodium 142, potassium 4.3, BUN 15, creatinine 0.75, GFR greater than 60, glucose 97, calcium 9.4. Cholesterol 174 with an HDL 66, LDL 96, triglycerides 61. ALT was 12, AST was 19. White count was 5.0, hemoglobin 12.9 with a platelet count 680,348. Vitamin D was 71.6. EKG showed normal sinus rhythm, possible old anterior myocardial infarction, left atrial enlargement.     Chest x-ray was unremarkable. IMPRESSION:  1. Abnormal EKG. 2.  History of SVT documented on Holter monitor on 09/21/2017, where her rate ranged from 120 to 157 with short bursts of SVT. 3.  Long history of dizziness with very mild carotid artery disease, less than 20%, on carotid ultrasound in 04/2017, with her being seen by Dr. Sandra Mccain in [place]. 4.  Hypertension, well controlled. 5.  Family history of coronary artery disease. 6.  Dizziness, which is noncardiac related. 7.  Mild shortness of breath. 8.  Mild-to-moderate mitral regurgitation with normal LV function, EF of 60%. PLAN:  1.  A 30-day event recorder. 2.  Lexiscan Cardiolite stress test.  3.  If the tests are unremarkable, we will call her with the results. If there is any significant abnormality then would bring her back to go over the results. 4.  If tests were unremarkable then I will see her as needed. I would recommend a repeat echocardiogram in 1 to 2 years to follow her mitral regurgitation. DISCUSSION:  Mrs. Brandon Lennon overall is doing relatively well. She does have mild-to-moderate mitral regurgitation in the abnormal EKG. She has a long history of dizziness, which does not sound that this is cardiac related. She, however, does have a history of SVT via Holter monitor in 2017. Therefore, I think it is reasonable to do a 30-day event recorder to see how much arrhythmia she is experiencing and to make sure there is no correlation with her SVT in any dizziness episodes. Of note, during her Holter monitor, the rate was relatively slow, being usually between 120 to 130 beats per minute with several episodes at 157 beats per minute. Her EKG is fairly abnormal.  I think it is reasonable to do a Lexiscan Cardiolite stress test to make sure she has no underlying coronary artery disease. Again, if her tests are unremarkable, we will call her with the results and we will see her as needed.   Again, I would follow her mitral regurgitation

## 2019-06-13 NOTE — PROGRESS NOTES
Shortness of breath with administration of medication. At 1 minute in complaint of pain in abdomen. At 2 minutes shortness of breath resolved. At 5 minutes abdomen pain resolved.

## 2019-06-14 ASSESSMENT — ENCOUNTER SYMPTOMS
CONSTIPATION: 0
COUGH: 0

## 2019-06-14 NOTE — PROCEDURES
[place] [address]                              CARDIAC STRESS TEST    PATIENT NAME: Anastacia Ford                     :        1935  MED REC NO:   [id number]                              ROOM:  ACCOUNT NO:   [de-identified]                           ADMIT DATE: 2019  PROVIDER:     Radha Coyle      DATE OF STUDY:  2019    LEXISCAN CARDIOLITE STRESS TEST:    INDICATION:  Chest pain. IMPRESSION:  1. We gave 0.4 mg of Lexiscan intravenously. 2.  This was followed in 20 seconds by Cardiolite infusion. 3.  There was no chest pain. 4.  There was no ST depression. 5.  It was an overall negative Lexiscan stress test.  6.  Cardiolite to follow.         Memory Dimitri    D: 2019 5:17:35       T: 2019 8:49:56     [email or web address]  Job#: [id number]     Doc#: [id number]    CC:  Trevor Su

## 2019-06-14 NOTE — PROCEDURES
[place] [address]                              CARDIAC STRESS TEST    PATIENT NAME: Yenny Brock                     :        1935  MED REC NO:   [id number]                              ROOM:  ACCOUNT NO:   [de-identified]                           ADMIT DATE: 2019  PROVIDER:     Divine Chaney      DATE OF STUDY:  2019    Cardiovascular Diagnostics Department    Ordering Provider:  Sandra Ryan MD    Primary Care Provider:  Jamil Caballero NP    Interpreting Physician:   Divine Chaney MD    MYOCARDIAL PERFUSION STRESS IMAGING    The stress ECG results are reported separately. NUCLEAR IMAGING RESULTS:  The overall quality of the study is good. Mild attenuation artifact was seen. There is no evidence of abnormal  lung uptake. Additionally, the right ventricle appears normal.  The  left ventricular cavity is noted to be normal in size on stress images. There is no evidence of transient ischemic dilatation (TID) of the left  ventricle. Gated SPECT imaging reveals normal myocardial thickening and wall motion  with a calculated left ventricular ejection fraction (EF) of 87%. The rest images demonstrated a small perfusion abnormality of mild  intensity in the inferoseptal region(s) which is most likely due to  artifact. On stress imaging, a small perfusion abnormality of mild intensity was  noted in the inferior region(s) which is most likely due to artifact. IMPRESSION:  1. Largely normal myocardial perfusion imaging with soft tissue  artifact, but without evidence of significant myocardial ischemia or  infarction. 2.  Global left ventricular systolic function was normal without  regional wall motion abnormalities. Overall these results are most consistent with a low risk for  significant coronary artery disease.         Benita Baker    D: 2019

## 2019-06-14 NOTE — PROGRESS NOTES
[address] [address]  Dept: [phone]  Dept Fax: [phone]    Last encounter 4/29/2019    HPI:   Preethi Kingston is a 82 y.o. female who presentstoday for her medical conditions/complaints as noted below. Preethi Kingsotn is c/o of Results (xray, mammogram, labs, echo, ekg)      HPI   82year old female here today with daughter Sandrine Hensley with desire to review recent testing. Chronic dizziness with head movements tries to keep head upright with signficiant dizziness with any downward motion. Still active with babysitting    Hx SVT controlled with low dose cardizem.  Pt with recent EKG abnormal.   Due to LVH added low dose lisinopril taken at bedtime    Cholesterol with lipid 96  HDL 66  No anemia  Kidney function normal      Past Medical History:   Diagnosis Date    Paroxysmal SVT (supraventricular tachycardia) ([address] 2017      Past Surgical History:   Procedure Laterality Date    CHOLECYSTECTOMY  1990    EYE SURGERY Right 8/24/15    cataract    EYE SURGERY Left 10/01/2015    cataract    OTHER SURGICAL HISTORY  03/2017    carotid eval Dr. Reba Tao with good results no significant stenosis but tortuous ICA no follow needed       Family History   Problem Relation Age of Onset    Cancer Father 71        esophagus    Other Brother 71        ALS    Heart Disease Brother           Social History     Tobacco Use    Smoking status: Passive Smoke Exposure - Never Smoker    Smokeless tobacco: Never Used   Substance Use Topics    Alcohol use: Not on file      Current Outpatient Medications   Medication Sig Dispense Refill    diltiazem (CARDIZEM) 30 MG tablet TAKE 1 TABLET BY MOUTH 2 TIMES DAILY 180 tablet 3    calcium carbonate 600 MG TABS tablet Take 1 tablet by mouth daily      Cholecalciferol (VITAMIN D3) 2000 units CAPS Take 1 capsule by mouth daily      Nutritional Supplements (NUTRITIONAL DRINK) LIQD Take by mouth Indications: Boost drinks Cardiovascular: Normal rate, regular rhythm and normal heart sounds. No murmur heard. Left soft carotid bruit    Pulmonary/Chest: Effort normal and breath sounds normal. No respiratory distress. She has no wheezes. Abdominal: Soft. There is no tenderness. Musculoskeletal: Normal range of motion. Lymphadenopathy:     She has no cervical adenopathy. Neurological: She is alert and oriented to person, place, and time. Skin: Skin is warm and dry. Psychiatric: She has a normal mood and affect. Her behavior is normal. Judgment and thought content normal.   Nursing note and vitals reviewed. BP (!) 110/54 (Site: Right Upper Arm, Position: Sitting, Cuff Size: Medium Adult)   Pulse 71   Temp 98.1 °F (36.7 °C) (Oral)   Wt 129 lb 3.2 oz (58.6 kg)   SpO2 94%   BMI 21.50 kg/m²     Data:     Lab Results   Component Value Date     05/09/2019    K 4.3 05/09/2019     05/09/2019    CO2 27 05/09/2019    BUN 15 05/09/2019    CREATININE 0.75 05/09/2019    GLUCOSE 97 05/09/2019    PROT 7.6 05/09/2019    LABALBU 4.8 05/09/2019    BILITOT 0.94 05/09/2019    ALKPHOS 70 05/09/2019    AST 19 05/09/2019    ALT 12 05/09/2019     Lab Results   Component Value Date    WBC 5.0 05/18/2019    RBC 4.10 05/18/2019    HGB 12.9 05/18/2019    HCT 38.4 05/18/2019    MCV 93.5 05/18/2019    MCH 31.5 05/18/2019    MCHC 33.7 05/18/2019    RDW 13.3 05/18/2019     05/18/2019    MPV NOT REPORTED 05/18/2019     Lab Results   Component Value Date    TSH 2.21 05/18/2019     Lab Results   Component Value Date    CHOL 174 05/09/2019    HDL 66 05/09/2019          Assessment & Plan       1. Paroxysmal SVT (supraventricular tachycardia) (HCC)  Continue cardizem    2. Abnormal EKG  appt with cardiology  3. Systolic murmur  Stable  Recent echo reviewed    4.  Intermittent lightheadedness  Chronic unlikely cardiac in origin      Keep appt with cardiology  See in 3 months              Completed Refills   Requested Prescriptions No prescriptions requested or ordered in this encounter     No follow-ups on file. No orders of the defined types were placed in this encounter. No orders of the defined types were placed in this encounter. Komal Lara received counseling on the following healthy behaviors: nutrition, exercise and medication adherence  Reviewed prior labs and health maintenance. Continue current medications, diet and exercise. Discussed use, benefit, and side effects of prescribed medications. Barriers to medication compliance addressed. Patient given educational materials - see patient instructions. All patient questions answered. Patient voiced understanding.           Electronically signed by ANNA MARIE Irvin CNP on 6/14/2019 at 12:36 AM

## 2019-07-10 DIAGNOSIS — I11.9 LVH (LEFT VENTRICULAR HYPERTROPHY) DUE TO HYPERTENSIVE DISEASE, WITHOUT HEART FAILURE: ICD-10-CM

## 2019-07-10 RX ORDER — LISINOPRIL 2.5 MG/1
2.5 TABLET ORAL DAILY
Qty: 90 TABLET | Refills: 1 | Status: SHIPPED | OUTPATIENT
Start: 2019-07-10 | End: 2019-10-01 | Stop reason: SINTOL

## 2019-07-10 NOTE — TELEPHONE ENCOUNTER
Patient last appt was 6/10/19. Requesting refill of Lisinopril to [place] [address]. Patient states she tolerates medication well, without complaints. Med is pending. Next appt : 11/4/19.   Thank you

## 2019-07-22 RX ORDER — DILTIAZEM HYDROCHLORIDE 120 MG/1
120 CAPSULE, COATED, EXTENDED RELEASE ORAL DAILY
Qty: 30 CAPSULE | Refills: 2 | Status: SHIPPED | OUTPATIENT
Start: 2019-07-22 | End: 2019-10-23 | Stop reason: SDUPTHER

## 2019-07-23 ENCOUNTER — TELEPHONE (OUTPATIENT)
Dept: CARDIOLOGY CLINIC | Age: 84
End: 2019-07-23

## 2019-09-10 ENCOUNTER — TELEPHONE (OUTPATIENT)
Dept: FAMILY MEDICINE CLINIC | Age: 84
End: 2019-09-10

## 2019-09-10 NOTE — TELEPHONE ENCOUNTER
Can trial claritin or zyrtec 10 mg daily, they may cause a little dryness but can help. Otherwise good hydration, good handwashing would not use OTC cough med or any decongestant due to racing heart.

## 2019-09-18 ENCOUNTER — TELEPHONE (OUTPATIENT)
Dept: FAMILY MEDICINE CLINIC | Age: 84
End: 2019-09-18

## 2019-09-18 NOTE — TELEPHONE ENCOUNTER
Patients son Kin salinas called stating patient has had a cough for years and states it has gotten worse. Family is very  concerned and would like lung testing done.  Please advise      Health Maintenance   Topic Date Due    DTaP/Tdap/Td vaccine (1 - Tdap) 04/11/1956    Shingles Vaccine (1 of 2) 04/11/1987    Pneumococcal 65+ years Vaccine (2 of 2 - PCV13) 01/01/2013    Annual Wellness Visit (AWV)  05/29/2019    Flu vaccine (1) 09/01/2019    Potassium monitoring  05/09/2020    Creatinine monitoring  05/09/2020    DEXA (modify frequency per FRAX score)  Addressed             (applicable per patient's age: Cancer Screenings, Depression Screening, Fall Risk Screening, Immunizations)    LDL Cholesterol (mg/dL)   Date Value   05/09/2019 96     AST (U/L)   Date Value   05/09/2019 19     ALT (U/L)   Date Value   05/09/2019 12     BUN (mg/dL)   Date Value   05/09/2019 15      (goal A1C is < 7)   (goal LDL is <100) need 30-50% reduction from baseline     BP Readings from Last 3 Encounters:   06/13/19 (!) 100/52   06/11/19 122/70   06/10/19 (!) 110/54    (goal /80)      All Future Testing planned in [facility]:  Lab Frequency Next Occurrence   DEXA BONE DENSITY 2 SITES Once 33/63/6077   Basic Metabolic Panel Once 93/86/6393       Next Visit Date:  Future Appointments   Date Time Provider Shana Maravillai   10/1/2019 10:30 AM MD Demetrio Gibson [facility]   11/4/2019  6:00 PM ANNA MARIE Arias CNP [facility]            Patient Active Problem List:     Left carotid bruit     Corn of foot     Family history of heart disease     Chronic cough     Acquired deviated nasal septum     Paroxysmal SVT (supraventricular tachycardia) ([address]

## 2019-10-01 ENCOUNTER — OFFICE VISIT (OUTPATIENT)
Dept: CARDIOLOGY CLINIC | Age: 84
End: 2019-10-01
Payer: MEDICARE

## 2019-10-01 VITALS
SYSTOLIC BLOOD PRESSURE: 120 MMHG | OXYGEN SATURATION: 98 % | BODY MASS INDEX: 20.47 KG/M2 | HEART RATE: 87 BPM | WEIGHT: 123 LBS | DIASTOLIC BLOOD PRESSURE: 70 MMHG

## 2019-10-01 DIAGNOSIS — I11.9 MALIGNANT HYPERTENSIVE HEART DISEASE WITHOUT HEART FAILURE: ICD-10-CM

## 2019-10-01 DIAGNOSIS — E55.9 VITAMIN D DEFICIENCY DISEASE: ICD-10-CM

## 2019-10-01 DIAGNOSIS — I47.1 PAROXYSMAL SUPRAVENTRICULAR TACHYCARDIA (HCC): Primary | ICD-10-CM

## 2019-10-01 PROCEDURE — G8484 FLU IMMUNIZE NO ADMIN: HCPCS | Performed by: INTERNAL MEDICINE

## 2019-10-01 PROCEDURE — G8420 CALC BMI NORM PARAMETERS: HCPCS | Performed by: INTERNAL MEDICINE

## 2019-10-01 PROCEDURE — G8399 PT W/DXA RESULTS DOCUMENT: HCPCS | Performed by: INTERNAL MEDICINE

## 2019-10-01 PROCEDURE — 4040F PNEUMOC VAC/ADMIN/RCVD: CPT | Performed by: INTERNAL MEDICINE

## 2019-10-01 PROCEDURE — 1090F PRES/ABSN URINE INCON ASSESS: CPT | Performed by: INTERNAL MEDICINE

## 2019-10-01 PROCEDURE — 1123F ACP DISCUSS/DSCN MKR DOCD: CPT | Performed by: INTERNAL MEDICINE

## 2019-10-01 PROCEDURE — G8598 ASA/ANTIPLAT THER USED: HCPCS | Performed by: INTERNAL MEDICINE

## 2019-10-01 PROCEDURE — 99212 OFFICE O/P EST SF 10 MIN: CPT | Performed by: INTERNAL MEDICINE

## 2019-10-01 PROCEDURE — G8428 CUR MEDS NOT DOCUMENT: HCPCS | Performed by: INTERNAL MEDICINE

## 2019-10-01 PROCEDURE — 1036F TOBACCO NON-USER: CPT | Performed by: INTERNAL MEDICINE

## 2019-10-23 RX ORDER — DILTIAZEM HYDROCHLORIDE 120 MG/1
120 CAPSULE, COATED, EXTENDED RELEASE ORAL DAILY
Qty: 90 CAPSULE | Refills: 1 | Status: SHIPPED | OUTPATIENT
Start: 2019-10-23 | End: 2020-04-24 | Stop reason: SDUPTHER

## 2019-11-04 ENCOUNTER — OFFICE VISIT (OUTPATIENT)
Dept: FAMILY MEDICINE CLINIC | Age: 84
End: 2019-11-04
Payer: MEDICARE

## 2019-11-04 VITALS
WEIGHT: 124 LBS | HEIGHT: 65 IN | TEMPERATURE: 98.4 F | BODY MASS INDEX: 20.66 KG/M2 | OXYGEN SATURATION: 98 % | SYSTOLIC BLOOD PRESSURE: 112 MMHG | HEART RATE: 63 BPM | DIASTOLIC BLOOD PRESSURE: 70 MMHG

## 2019-11-04 DIAGNOSIS — Z78.0 POSTMENOPAUSAL ESTROGEN DEFICIENCY: ICD-10-CM

## 2019-11-04 DIAGNOSIS — I11.9 LVH (LEFT VENTRICULAR HYPERTROPHY) DUE TO HYPERTENSIVE DISEASE, WITHOUT HEART FAILURE: ICD-10-CM

## 2019-11-04 DIAGNOSIS — R01.1 SYSTOLIC MURMUR: ICD-10-CM

## 2019-11-04 DIAGNOSIS — I47.1 PAROXYSMAL SVT (SUPRAVENTRICULAR TACHYCARDIA) (HCC): ICD-10-CM

## 2019-11-04 DIAGNOSIS — M85.89 OSTEOPENIA OF MULTIPLE SITES: ICD-10-CM

## 2019-11-04 DIAGNOSIS — R05.3 CHRONIC COUGH: Primary | ICD-10-CM

## 2019-11-04 DIAGNOSIS — Z77.22 SECOND HAND SMOKE EXPOSURE: ICD-10-CM

## 2019-11-04 PROCEDURE — 99214 OFFICE O/P EST MOD 30 MIN: CPT | Performed by: NURSE PRACTITIONER

## 2019-11-04 PROCEDURE — 1090F PRES/ABSN URINE INCON ASSESS: CPT | Performed by: NURSE PRACTITIONER

## 2019-11-04 PROCEDURE — 1123F ACP DISCUSS/DSCN MKR DOCD: CPT | Performed by: NURSE PRACTITIONER

## 2019-11-04 PROCEDURE — 4040F PNEUMOC VAC/ADMIN/RCVD: CPT | Performed by: NURSE PRACTITIONER

## 2019-11-04 PROCEDURE — G8484 FLU IMMUNIZE NO ADMIN: HCPCS | Performed by: NURSE PRACTITIONER

## 2019-11-04 PROCEDURE — G8420 CALC BMI NORM PARAMETERS: HCPCS | Performed by: NURSE PRACTITIONER

## 2019-11-04 PROCEDURE — G8427 DOCREV CUR MEDS BY ELIG CLIN: HCPCS | Performed by: NURSE PRACTITIONER

## 2019-11-04 PROCEDURE — G8598 ASA/ANTIPLAT THER USED: HCPCS | Performed by: NURSE PRACTITIONER

## 2019-11-04 PROCEDURE — G8399 PT W/DXA RESULTS DOCUMENT: HCPCS | Performed by: NURSE PRACTITIONER

## 2019-11-04 PROCEDURE — 1036F TOBACCO NON-USER: CPT | Performed by: NURSE PRACTITIONER

## 2019-11-04 RX ORDER — OMEPRAZOLE 20 MG/1
20 CAPSULE, DELAYED RELEASE ORAL
Qty: 90 CAPSULE | Refills: 0 | Status: SHIPPED | OUTPATIENT
Start: 2019-11-04 | End: 2020-07-13 | Stop reason: ALTCHOICE

## 2019-11-05 ASSESSMENT — ENCOUNTER SYMPTOMS
COUGH: 0
ABDOMINAL DISTENTION: 0
SINUS PRESSURE: 0
EYES NEGATIVE: 1

## 2019-11-14 ENCOUNTER — HOSPITAL ENCOUNTER (OUTPATIENT)
Dept: BONE DENSITY | Age: 84
Discharge: HOME | End: 2019-11-16
Payer: MEDICARE

## 2019-11-14 DIAGNOSIS — M85.89 OSTEOPENIA OF MULTIPLE SITES: ICD-10-CM

## 2019-11-14 DIAGNOSIS — Z78.0 POSTMENOPAUSAL ESTROGEN DEFICIENCY: ICD-10-CM

## 2019-11-14 PROCEDURE — 77080 DXA BONE DENSITY AXIAL: CPT

## 2020-04-24 ENCOUNTER — TELEPHONE (OUTPATIENT)
Dept: PRIMARY CARE CLINIC | Age: 85
End: 2020-04-24

## 2020-04-24 RX ORDER — DILTIAZEM HYDROCHLORIDE 120 MG/1
120 CAPSULE, COATED, EXTENDED RELEASE ORAL DAILY
Qty: 90 CAPSULE | Refills: 1 | Status: SHIPPED
Start: 2020-04-24 | End: 2020-07-13 | Stop reason: ALTCHOICE

## 2020-07-08 ENCOUNTER — HOSPITAL ENCOUNTER (OUTPATIENT)
Age: 85
Discharge: HOME | End: 2020-07-08
Payer: MEDICARE

## 2020-07-08 LAB
ABSOLUTE EOS #: 0.2 K/UL (ref 0–0.4)
ABSOLUTE IMMATURE GRANULOCYTE: ABNORMAL K/UL (ref 0–0.3)
ABSOLUTE LYMPH #: 1.3 K/UL (ref 1–4.8)
ABSOLUTE MONO #: 0.6 K/UL (ref 0–1)
ALBUMIN SERPL-MCNC: 4.5 G/DL (ref 3.5–5.2)
ALBUMIN/GLOBULIN RATIO: ABNORMAL (ref 1–2.5)
ALP BLD-CCNC: 61 U/L (ref 35–104)
ALT SERPL-CCNC: 11 U/L (ref 5–33)
ANION GAP SERPL CALCULATED.3IONS-SCNC: 9 MMOL/L (ref 9–17)
AST SERPL-CCNC: 20 U/L
BASOPHILS # BLD: 1 % (ref 0–2)
BASOPHILS ABSOLUTE: 0 K/UL (ref 0–0.2)
BILIRUB SERPL-MCNC: 0.86 MG/DL (ref 0.3–1.2)
BUN BLDV-MCNC: 16 MG/DL (ref 8–23)
BUN/CREAT BLD: 19 (ref 9–20)
CALCIUM SERPL-MCNC: 10.3 MG/DL (ref 8.6–10.4)
CHLORIDE BLD-SCNC: 104 MMOL/L (ref 98–107)
CHOLESTEROL/HDL RATIO: 2.7
CHOLESTEROL: 150 MG/DL
CO2: 26 MMOL/L (ref 20–31)
CREAT SERPL-MCNC: 0.86 MG/DL (ref 0.5–0.9)
DIFFERENTIAL TYPE: YES
EOSINOPHILS RELATIVE PERCENT: 4 % (ref 0–5)
GFR AFRICAN AMERICAN: >60 ML/MIN
GFR NON-AFRICAN AMERICAN: >60 ML/MIN
GFR SERPL CREATININE-BSD FRML MDRD: ABNORMAL ML/MIN/{1.73_M2}
GFR SERPL CREATININE-BSD FRML MDRD: ABNORMAL ML/MIN/{1.73_M2}
GLUCOSE BLD-MCNC: 103 MG/DL (ref 70–99)
HCT VFR BLD CALC: 38.5 % (ref 36–46)
HDLC SERPL-MCNC: 55 MG/DL
HEMOGLOBIN: 13.1 G/DL (ref 12–16)
IMMATURE GRANULOCYTES: ABNORMAL %
LDL CHOLESTEROL: 85 MG/DL (ref 0–130)
LYMPHOCYTES # BLD: 25 % (ref 15–40)
MAGNESIUM: 2 MG/DL (ref 1.6–2.6)
MCH RBC QN AUTO: 31.8 PG (ref 26–34)
MCHC RBC AUTO-ENTMCNC: 34.2 G/DL (ref 31–37)
MCV RBC AUTO: 93 FL (ref 80–100)
MONOCYTES # BLD: 11 % (ref 4–8)
NRBC AUTOMATED: ABNORMAL PER 100 WBC
PATIENT FASTING?: YES
PDW BLD-RTO: 13.1 % (ref 12.1–15.2)
PLATELET # BLD: 214 K/UL (ref 140–450)
PLATELET ESTIMATE: ABNORMAL
PMV BLD AUTO: ABNORMAL FL (ref 6–12)
POTASSIUM SERPL-SCNC: 4.3 MMOL/L (ref 3.7–5.3)
RBC # BLD: 4.14 M/UL (ref 4–5.2)
RBC # BLD: ABNORMAL 10*6/UL
SEG NEUTROPHILS: 59 % (ref 47–75)
SEGMENTED NEUTROPHILS ABSOLUTE COUNT: 3.1 K/UL (ref 2.5–7)
SODIUM BLD-SCNC: 139 MMOL/L (ref 135–144)
TOTAL PROTEIN: 7.7 G/DL (ref 6.4–8.3)
TRIGL SERPL-MCNC: 52 MG/DL
TSH SERPL DL<=0.05 MIU/L-ACNC: 4.2 MIU/L (ref 0.3–5)
VITAMIN D 25-HYDROXY: 107.4 NG/ML (ref 30–100)
VLDLC SERPL CALC-MCNC: NORMAL MG/DL (ref 1–30)
WBC # BLD: 5.2 K/UL (ref 3.5–11)
WBC # BLD: ABNORMAL 10*3/UL

## 2020-07-08 PROCEDURE — 93005 ELECTROCARDIOGRAM TRACING: CPT

## 2020-07-08 PROCEDURE — 82306 VITAMIN D 25 HYDROXY: CPT

## 2020-07-08 PROCEDURE — 80061 LIPID PANEL: CPT

## 2020-07-08 PROCEDURE — 36415 COLL VENOUS BLD VENIPUNCTURE: CPT

## 2020-07-08 PROCEDURE — 80053 COMPREHEN METABOLIC PANEL: CPT

## 2020-07-08 PROCEDURE — 84443 ASSAY THYROID STIM HORMONE: CPT

## 2020-07-08 PROCEDURE — 83735 ASSAY OF MAGNESIUM: CPT

## 2020-07-08 PROCEDURE — 85025 COMPLETE CBC W/AUTO DIFF WBC: CPT

## 2020-07-09 LAB
EKG ATRIAL RATE: 64 BPM
EKG P AXIS: 69 DEGREES
EKG P-R INTERVAL: 166 MS
EKG Q-T INTERVAL: 408 MS
EKG QRS DURATION: 90 MS
EKG QTC CALCULATION (BAZETT): 420 MS
EKG R AXIS: 67 DEGREES
EKG T AXIS: 73 DEGREES
EKG VENTRICULAR RATE: 64 BPM

## 2020-07-09 PROCEDURE — 93010 ELECTROCARDIOGRAM REPORT: CPT | Performed by: INTERNAL MEDICINE

## 2020-07-10 ENCOUNTER — HOSPITAL ENCOUNTER (OUTPATIENT)
Dept: GENERAL RADIOLOGY | Age: 85
Discharge: HOME | End: 2020-07-12
Payer: MEDICARE

## 2020-07-10 ENCOUNTER — HOSPITAL ENCOUNTER (OUTPATIENT)
Age: 85
Discharge: HOME | End: 2020-07-12
Payer: MEDICARE

## 2020-07-10 PROCEDURE — 71046 X-RAY EXAM CHEST 2 VIEWS: CPT

## 2020-07-13 ENCOUNTER — OFFICE VISIT (OUTPATIENT)
Dept: CARDIOLOGY CLINIC | Age: 85
End: 2020-07-13
Payer: MEDICARE

## 2020-07-13 VITALS
HEART RATE: 87 BPM | OXYGEN SATURATION: 96 % | SYSTOLIC BLOOD PRESSURE: 140 MMHG | WEIGHT: 125 LBS | DIASTOLIC BLOOD PRESSURE: 70 MMHG | BODY MASS INDEX: 20.8 KG/M2

## 2020-07-13 PROCEDURE — G8399 PT W/DXA RESULTS DOCUMENT: HCPCS | Performed by: INTERNAL MEDICINE

## 2020-07-13 PROCEDURE — 99214 OFFICE O/P EST MOD 30 MIN: CPT | Performed by: INTERNAL MEDICINE

## 2020-07-13 PROCEDURE — 1090F PRES/ABSN URINE INCON ASSESS: CPT | Performed by: INTERNAL MEDICINE

## 2020-07-13 PROCEDURE — G8427 DOCREV CUR MEDS BY ELIG CLIN: HCPCS | Performed by: INTERNAL MEDICINE

## 2020-07-13 PROCEDURE — 1036F TOBACCO NON-USER: CPT | Performed by: INTERNAL MEDICINE

## 2020-07-13 PROCEDURE — 4040F PNEUMOC VAC/ADMIN/RCVD: CPT | Performed by: INTERNAL MEDICINE

## 2020-07-13 PROCEDURE — G8420 CALC BMI NORM PARAMETERS: HCPCS | Performed by: INTERNAL MEDICINE

## 2020-07-13 PROCEDURE — 1123F ACP DISCUSS/DSCN MKR DOCD: CPT | Performed by: INTERNAL MEDICINE

## 2020-07-13 RX ORDER — DILTIAZEM HYDROCHLORIDE 90 MG/1
90 CAPSULE, EXTENDED RELEASE ORAL DAILY
COMMUNITY
End: 2020-07-13 | Stop reason: SDUPTHER

## 2020-07-13 RX ORDER — DILTIAZEM HYDROCHLORIDE 90 MG/1
90 CAPSULE, EXTENDED RELEASE ORAL DAILY
Qty: 90 CAPSULE | Refills: 3 | Status: SHIPPED | OUTPATIENT
Start: 2020-07-13 | End: 2021-07-26 | Stop reason: SDUPTHER

## 2020-07-13 NOTE — PATIENT INSTRUCTIONS
Cut back to 2000 units of Vit D    Will STOP Cardizem  mg daily   And START Cardizem 90 mg ONE daily     Will follow up in one year

## 2020-07-13 NOTE — LETTER
Xavi Bee M.D. [address] [place] [address]  [phone]        2020        Masoud Justin, CNP  [address] [facility] 82    RE:   Delmi Gaffney  :  1935    Dear Alayna Repress:    CHIEF COMPLAINT:  1.  Palpitations with a history of SVT. 2.  Mild-to-moderate mitral regurgitation. HISTORY OF PRESENT ILLNESS:  I had the pleasure of seeing Mrs. Mindi Pedroza in our office on 2020. She is a very pleasant 61-year-old female who has a history of SVT. She also has a history of dizziness and lightheadedness. She had a Holter monitor in 2017 that showed sinus rhythm with occasional short bursts of SVT at 120 to 130 beats per minute. We did an event recorder, echocardiogram, and stress test in 2019 and 2019. Her echocardiogram showed normal LV function. Her event recorder showed short bursts of SVT up to 180 beats per minute. We increased her Cardizem to Cardizem  mg daily and I am seeing her today in followup. Her stress test was normal.    She has done well over the last year. She has had no chest pain or chest discomfort, no unusual shortness of breath or loss of energy. She still has some dizziness, but she has not had any sustained dizziness. She takes her blood pressure at home and is often slightly low in the 105 to 110 range. Her heart rate is usually in the 60 and 70s. She has had no tachyarrhythmias. She stays active at home. She does her own gardening with tomatoes and now peppers. She has had no hospitalizations or procedures. She really has no complaints as I see her today. CARDIAC RISK FACTORS:  Prior MI:  Negative. Hypertension:  Positive. Hyperlipidemia:  Negative. Other Family Members:  Positive. Smoking:  Negative. Diabetes:  Negative. MEDICATIONS AT HOME:  She is on vitamin D 5000 units daily, Cardizem  mg daily, and eye drops daily. PAST MEDICAL HISTORY:  1. History of SVT, well controlled. 2.  Status post cataract surgery on the right side on 2015, and on the left side on 10/01/2015. 3.  Cholecystectomy in . FAMILY HISTORY:  Had an uncle with MI. Father  of esophageal cancer. Brother passed away of ALS. Older brother had 3 MIs. SOCIAL HISTORY:  She is 82years old. Had 10 children. Daughter, Jamal Escobedo, is in town. She has 26 grandchildren, with 13 to 15 great-grandchildren. Her  was an . She does not smoke or drink alcohol. Her daughter in [place] just was reported to have [place]. REVIEW OF SYSTEMS:  Cardiac as above. Other systems reviewed including constitutional, eyes, ears, nose and throat, cardiovascular, respiratory, GI, , musculoskeletal, integumentary, neurologic, endocrine, hematologic and allergic/immunologic are negative except for what is described above. No weight loss or weight gain. No change in bowel habits. No blood in stools. No fevers, sweats or chills. PHYSICAL EXAMINATION:  VITAL SIGNS:  Her blood pressure was 140/70 with a heart rate of 80 and regular. Respiratory rate 18. O2 sat 96%. Weight 125 pounds. GENERAL:  She is a pleasant 51-year-old female. Denied pain. She was oriented to person, place and time. Answered questions appropriately. SKIN:  No unusual skin change. HEENT:  The pupils are equally round and intact. Mucous membranes were dry. NECK:  No JVD. Good carotid pulses. No carotid bruits. No lymphadenopathy or thyromegaly. CARDIOVASCULAR EXAM:  S1 and S2 were normal.  No S3 or S4. Soft systolic blowing type murmur. No diastolic murmur. PMI was normal.  No lift, thrust, or pericardial friction rub. LUNGS:  Quite clear to auscultation and percussion. ABDOMEN:  Soft and nontender. Good bowel sounds. EXTREMITIES:  Good femoral pulses. Good pedal pulses. No pedal edema. LABORATORY DATA:  From 07/08, sodium 139, potassium 4.3, BUN 16, creatinine 0.86, GFR greater than 60, magnesium 2.0, glucose 103, calcium 10.3. Cholesterol 115, with an HDL of 55, LDL 85, triglycerides 52. ALT was 11, AST was 20. TSH 4.20. Vitamin D was 107. White count 5.2, hemoglobin 13.1, with a platelet count of 032,787. EKG showed sinus rhythm and was normal.    Chest x-ray was unremarkable. We checked her blood pressure cuff and it did appear to be quite accurate. IMPRESSION:  1. History of SVT, with her having no known episodes since being on Cardizem  mg daily. 2.  History of hypertension, which at this point is somewhat low at home in the 105 to 110 range. 3.  No syncope or near syncope and no hospitalizations or procedures. 4.  Elevated vitamin D. PLAN:  1. Decrease Cardizem to ER 90 mg once a day. 2.  Decrease vitamin D to 2000 units daily. 3.  See in 1 year. DISCUSSION:  Mrs. Jeremi Arnold overall is doing well. She has had no known episodes of SVT and she really has had no syncope or near-syncopal episodes. She has occasional mild dizziness but has not been severe. Because her blood pressure is slightly low, we will decrease Cardizem to 90 mg once a day. She will be seeing Gustavo De Leon shortly and she can reevaluate on this dose. If she starts having episodes of lightheadedness or dizziness that would worsen, then I would repeat an event recorder. I will see her in 1 year in followup. I would be glad to see her earlier if deemed necessary. Thank you very much.      Sincerely,        Kirsten [place]    D: 07/13/2020 8:27:47     T: 07/13/2020 8:31:20     [email or web address]  Job#: [id number]   Doc#: [id number]

## 2020-07-13 NOTE — PROGRESS NOTES
Ov Dr. 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

## 2020-07-15 NOTE — PROGRESS NOTES
MEDICAL HISTORY:  1. History of SVT, well controlled. 2.  Status post cataract surgery on the right side on 2015, and on the left side on 10/01/2015. 3.  Cholecystectomy in . FAMILY HISTORY:  Had an uncle with MI. Father  of esophageal cancer. Brother passed away of ALS. Older brother had 3 MIs. SOCIAL HISTORY:  She is 82years old. Had 10 children. Daughter, Soraida Reis, is in town. She has 26 grandchildren, with 13 to 15 great-grandchildren. Her  was an . She does not smoke or drink alcohol. Her daughter in [place] just was reported to have [place]. REVIEW OF SYSTEMS:  Cardiac as above. Other systems reviewed including constitutional, eyes, ears, nose and throat, cardiovascular, respiratory, GI, , musculoskeletal, integumentary, neurologic, endocrine, hematologic and allergic/immunologic are negative except for what is described above. No weight loss or weight gain. No change in bowel habits. No blood in stools. No fevers, sweats or chills. PHYSICAL EXAMINATION:  VITAL SIGNS:  Her blood pressure was 140/70 with a heart rate of 80 and regular. Respiratory rate 18. O2 sat 96%. Weight 125 pounds. GENERAL:  She is a pleasant 44-year-old female. Denied pain. She was oriented to person, place and time. Answered questions appropriately. SKIN:  No unusual skin change. HEENT:  The pupils are equally round and intact. Mucous membranes were dry. NECK:  No JVD. Good carotid pulses. No carotid bruits. No lymphadenopathy or thyromegaly. CARDIOVASCULAR EXAM:  S1 and S2 were normal.  No S3 or S4. Soft systolic blowing type murmur. No diastolic murmur. PMI was normal.  No lift, thrust, or pericardial friction rub. LUNGS:  Quite clear to auscultation and percussion. ABDOMEN:  Soft and nontender. Good bowel sounds. EXTREMITIES:  Good femoral pulses. Good pedal pulses. No pedal edema.     LABORATORY DATA:  From , sodium 139, potassium 4.3, BUN 16, creatinine 0.86, GFR greater than 60, magnesium 2.0, glucose 103, calcium 10.3. Cholesterol 115, with an HDL of 55, LDL 85, triglycerides 52. ALT was 11, AST was 20. TSH 4.20. Vitamin D was 107. White count 5.2, hemoglobin 13.1, with a platelet count of 567,224. EKG showed sinus rhythm and was normal.    Chest x-ray was unremarkable. We checked her blood pressure cuff and it did appear to be quite accurate. IMPRESSION:  1. History of SVT, with her having no known episodes since being on Cardizem  mg daily. 2.  History of hypertension, which at this point is somewhat low at home in the 105 to 110 range. 3.  No syncope or near syncope and no hospitalizations or procedures. 4.  Elevated vitamin D. PLAN:  1. Decrease Cardizem to ER 90 mg once a day. 2.  Decrease vitamin D to 2000 units daily. 3.  See in 1 year. DISCUSSION:  Mrs. Winter Greer overall is doing well. She has had no known episodes of SVT and she really has had no syncope or near-syncopal episodes. She has occasional mild dizziness but has not been severe. Because her blood pressure is slightly low, we will decrease Cardizem to 90 mg once a day. She will be seeing Mona De Leon shortly and she can reevaluate on this dose. If she starts having episodes of lightheadedness or dizziness that would worsen, then I would repeat an event recorder. I will see her in 1 year in followup. I would be glad to see her earlier if deemed necessary. Thank you very much.      Sincerely,        Shelley Saint    D: 07/13/2020 8:27:47     T: 07/13/2020 8:31:20     [email or web address]  Job#: [id number]   Doc#: [id number]

## 2021-04-19 ENCOUNTER — OFFICE VISIT (OUTPATIENT)
Dept: FAMILY MEDICINE CLINIC | Age: 86
End: 2021-04-19
Payer: MEDICARE

## 2021-04-19 VITALS
WEIGHT: 128 LBS | BODY MASS INDEX: 21.85 KG/M2 | HEIGHT: 64 IN | OXYGEN SATURATION: 99 % | SYSTOLIC BLOOD PRESSURE: 122 MMHG | DIASTOLIC BLOOD PRESSURE: 70 MMHG | HEART RATE: 78 BPM

## 2021-04-19 DIAGNOSIS — Z00.00 ROUTINE GENERAL MEDICAL EXAMINATION AT A HEALTH CARE FACILITY: Primary | ICD-10-CM

## 2021-04-19 DIAGNOSIS — I47.1 PAROXYSMAL SUPRAVENTRICULAR TACHYCARDIA (HCC): ICD-10-CM

## 2021-04-19 DIAGNOSIS — R09.89 LEFT CAROTID BRUIT: ICD-10-CM

## 2021-04-19 PROCEDURE — 4040F PNEUMOC VAC/ADMIN/RCVD: CPT | Performed by: NURSE PRACTITIONER

## 2021-04-19 PROCEDURE — 1123F ACP DISCUSS/DSCN MKR DOCD: CPT | Performed by: NURSE PRACTITIONER

## 2021-04-19 PROCEDURE — G0439 PPPS, SUBSEQ VISIT: HCPCS | Performed by: NURSE PRACTITIONER

## 2021-04-19 RX ORDER — ACETAMINOPHEN, ASPIRIN AND CAFFEINE 250; 250; 65 MG/1; MG/1; MG/1
1 TABLET, FILM COATED ORAL EVERY 6 HOURS PRN
COMMUNITY
End: 2022-08-02

## 2021-04-19 ASSESSMENT — PATIENT HEALTH QUESTIONNAIRE - PHQ9
2. FEELING DOWN, DEPRESSED OR HOPELESS: 0
1. LITTLE INTEREST OR PLEASURE IN DOING THINGS: 0
SUM OF ALL RESPONSES TO PHQ9 QUESTIONS 1 & 2: 0
SUM OF ALL RESPONSES TO PHQ QUESTIONS 1-9: 0

## 2021-04-19 NOTE — PROGRESS NOTES
Medicare Annual Wellness Visit  Name: Daniel Mittal Date: 2021   MRN: [id number] Sex: Female   Age: 82 y.o. Ethnicity: Unavailable/Unknown   : 1935 Race: Emirta Cousins is here for Medicare AWV    Screenings for behavioral, psychosocial and functional/safety risks, and cognitive dysfunction are all negative except as indicated below. These results, as well as other patient data from the [address] form, are documented in Flowsheets linked to this Encounter. No dental insurance, has multiple broken teeth in mouth. No Known Allergies      Prior to Visit Medications    Medication Sig Taking?  Authorizing Provider   aspirin-acetaminophen-caffeine (EXCEDRIN MIGRAINE) 337-176-96 MG per tablet Take 1 tablet by mouth every 6 hours as needed for Headaches Yes Historical Provider, MD   UNABLE TO [address] promise Yes Historical Provider, MD   Nutritional Supplements (BOOST PO) Take by mouth Yes Historical Provider, MD   dilTIAZem (CARDIZEM 12 HR) 90 MG extended release capsule Take 1 capsule by mouth daily Yes Emanuel Seymour MD   Cholecalciferol (VITAMIN D3) 125 MCG (5000 UT) TABS Take 5,000 Units by mouth daily Indications: [employer] organic cocnut oil Yes Historical Provider, MD   calcium carbonate 600 MG TABS tablet Take 1 tablet by mouth daily Yes Historical Provider, MD   Multiple Vitamins-Minerals (THERAPEUTIC MULTIVITAMIN-MINERALS) tablet Take 1 tablet by mouth daily Yes Historical Provider, MD   Glucosamine 500 MG CAPS Take 1 tablet by mouth daily Yes Historical Provider, MD   Chondroitin Sulfate 400 MG CAPS Take 1 tablet by mouth daily Yes Historical Provider, MD         Past Medical History:   Diagnosis Date    Paroxysmal SVT (supraventricular tachycardia) ([facility] [address]        Past Surgical History:   Procedure Laterality Date    CHOLECYSTECTOMY  1990    EYE SURGERY Right 8/24/15    cataract    EYE SURGERY Left 10/01/2015    cataract    OTHER SURGICAL HISTORY 03/2017    carotid eval Dr. Harley Lights with good results no significant stenosis but tortuous ICA no follow needed         Family History   Problem Relation Age of Onset    Cancer Father 71        esophagus    Other Brother 71        ALS    Heart Disease Brother        CareTeam (Including outside providers/suppliers regularly involved in providing care):   Patient Care Team:  ANNA MARIE Heredia CNP as PCP - General  ANNA MARIE Heredia CNP as PCP [facility]    Wt Readings from Last 3 Encounters:   04/19/21 128 lb (58.1 kg)   07/13/20 125 lb (56.7 kg)   11/04/19 124 lb (56.2 kg)     Vitals:    04/19/21 1430   BP: 122/70   Site: Right Upper Arm   Position: Sitting   Cuff Size: Medium Adult   Pulse: 78   SpO2: 99%   Weight: 128 lb (58.1 kg)   Height: 5' 4\" (1.626 m)     Body mass index is 21.97 kg/m². Based upon direct observation of the patient, evaluation of cognition reveals recent and remote memory intact. General Appearance: alert and oriented to person, place and time, well developed and well- nourished, in no acute distress  Skin: warm and dry, no rash or erythema  Head: normocephalic and atraumatic  Eyes: pupils equal, round, and reactive to light, extraocular eye movements intact, conjunctivae normal  ENT: tympanic membrane, external ear and ear canal normal bilaterally, nose without deformity  Neck: supple and non-tender without mass, no thyromegaly or thyroid nodules, no cervical lymphadenopathy  Pulmonary/Chest: clear to auscultation bilaterally- no wheezes, rales or rhonchi, normal air movement, no respiratory distress  Cardiovascular: normal rate, regular rhythm, normal S1 and S2, no murmurs, rubs, clicks, or gallops, distal pulses intact, no carotid bruit on right, carotid bruit on left auscultated.   Abdomen: non-tender  Extremities: no cyanosis, clubbing or edema  Musculoskeletal: normal range of motion, no joint swelling, deformity or tenderness  Neurologic: gait, coordination and speech normal.    Patient's complete Health Risk Assessment and screening values have been reviewed and are found in Flowsheets. The following problems were reviewed today and where indicated follow up appointments were made and/or referrals ordered. Positive Risk Factor Screenings with Interventions:            General Health and ACP:  General  In general, how would you say your health is?: Good  In the past 7 days, have you experienced any of the following? New or Increased Pain, New or Increased Fatigue, Loneliness, Social Isolation, Stress or Anger?: None of These  Do you get the social and emotional support that you need?: Yes  Do you have a Living Will?: (!) No  Advance Directives     Power of [facility] Will ACP-Advance Directive ACP-Power of     Not on File Not on File Not on File Not on File      General Health Risk Interventions:  · No Living Will: Advance Care Planning addressed with patient today, Patient referred to ACP Clinical Specialist and would like to do living will with  and Inell Stade daughter. has 10 kids and would like to have things written down. Health Habits/Nutrition:  Health Habits/Nutrition  Do you exercise for at least 20 minutes 2-3 times per week?: Yes  Have you lost any weight without trying in the past 3 months?: No  Do you eat only one meal per day?: No  Have you seen the dentist within the past year?: (!) No(no dental insurance)  Body mass index: 21.97  Health Habits/Nutrition Interventions:  · Inadequate physical activity:  walking  and staying active. · Nutritional issues:  educational materials for healthy, well-balanced diet provided  · Dental exam overdue:  patient encouraged to make appointment with his/her dentist, needs referral to dentist , [facility].      Hearing/Vision:  No exam data present  Hearing/Vision  Do you or your family notice any trouble with your hearing that hasn't been managed with hearing aids?:

## 2021-04-19 NOTE — PATIENT INSTRUCTIONS
You may be receiving a survey from [employer] regarding your visit today. You may get this in the mail, through your MyChart or in your email. Please complete the survey to enable us to provide the highest quality of care to you and your family. If you cannot score us as very good ( 5 Stars) on any question, please feel free to call the office to discuss how we could have made your experience exceptional.     Thank You! Coty Foster, APRN-CNP  [address] [place], [address]    Phone: [phone]  Fax: [phone] [facility] Office Hours:  Monday: Tammy [facility] office location 8-5 ([phone]) Offering additional late hours the first Monday of the month until 7 pm.   Tuesday: 8-5 Wednesday: 8-5 Thursday:  Additional hours offered 2 Thursdays a month. Please call to inquire those dates. Fridays: 7:30-4:30        Dental care: [address] [place] [address] suggested for evaluation multiple broken teeth. Covid vaccine, Tdap need updated at local pharmacy. Patient Education        COVID-19 (coronavirus 2019) vaccine, Pfizer  Pronunciation:  KOE vid-19 koe GABRIEL na vye dhiraj VAX een  Brand:  Pfizer-BioNTech COVID-19 Vaccine  What is the most important information I should know about this vaccine? The FDA has authorized emergency use of this vaccine as it may help prevent infection with COVID-19. This vaccine has not been approved to prevent or treat coronavirus or COVID-19. You should not receive a second vaccine if you had a life-threatening allergic reaction after the first shot. Becoming infected with COVID-19 is much more dangerous to your health than receiving this vaccine. What is the COVID-19 vaccine? COVID-19 is a serious disease caused by a coronavirus called SARS-CoV-2 (Serious Acute Respiratory Syndrome Coronavirus 2). Coronaviruses are common causes of illness worldwide. COVID-19 is a type of coronavirus never seen in humans before 2019.  COVID-19 is spread from person to person through the air. COVID-19 can affect your lungs or other organs. COVID-19 symptoms may be mild or serious and include fever, chills, cough, sore throat, shortness of breath, tiredness, body aches, headache, loss of taste or smell, runny or stuffy nose, nausea, vomiting, or diarrhea. The [address] and Drug Administration (FDA) has authorized emergency use of COVID-19 vaccine to help prevent infection with SARS-CoV-2. This vaccine is for use in people who are at least 12years old. This vaccine may help your body develop immunity to SARS-CoV-2. However, this vaccine has not been approved to prevent or treat coronavirus or COVID-19. COVID-19 vaccine is experimental and all of its risks are not yet known. The COVID-19 vaccine does not contain coronavirus and cannot give you COVID-19. Like any vaccine, COVID-19 vaccine may not provide protection in every person. What should I discuss with my healthcare provider before receiving this vaccine? You should not receive this vaccine if you've ever had a life-threatening allergic reaction to a COVID-19 vaccine. Tell your doctor if you have a fever, or if you have ever had:  · an allergy to a medicine, food, dye, or preservative;  · a weak immune system caused by disease or by using certain medicine (this vaccine may not be as effective if you are immunosuppressed);  · bleeding problems; or  · if you use a blood thinner (such as warfarin, Coumadin, or Niya Carbon). Tell your doctor if you are pregnant or breastfeeding. It is not known how COVID-19 vaccine will affect other vaccines. Tell your doctor about all other vaccines you have recently received. How is this vaccine given? Read all vaccine information sheets provided to you. COVID-19 vaccine is given as an injection (shot) into a muscle, in a series of 2 shots given 3 weeks apart. The timing of these shots is very important for the vaccine to be effective.  You will receive a reminder card showing the date of your first injection. Take this card with you when you get your second shot. Infection with COVID-19 can pose severe health risks and a person may become infected more than once. You may need to receive this vaccine even if you've already been infected with COVID-19. Follow your doctor's instructions or the recommendation of your local health department. It may take a few weeks for your body to build immunity to COVID-19. You may become infected with COVID-19 if the vaccine has not had enough time to provide protection. It is not known how long this vaccine will protect you from infection with COVID-19. It also is not known how long immunity will last in a person who's been infected with and recovered from COVID-19. Receiving this vaccine will not make you less contagious to other people if you are already infected with COVID-19 but you have no symptoms. Keep using infection control methods such as self-isolation, social distancing, hand-washing, using protective face covering, disinfecting surfaces you touch a lot, and not sharing personal items with others. Receiving a COVID-19 vaccine will not cause you to test positive on a coronavirus test. However, once your body develops immunity to COVID-19, you could test positive on an antibody test (a test to detect immunity in your body from previous exposure to coronavirus). If you've already had COVID-19, this vaccine may not keep you from becoming infected a second time. COVID-19 vaccine is still being studied and all of its risks are not yet known. COVID-19 vaccine is transported and stored at ultra-cold temperatures. However, this vaccine will be at room temperature when it is injected into your arm. What happens if I miss a dose? Be sure to receive all recommended doses of COVID-19 vaccine or you may not be fully protected. Contact your doctor, pharmacist, or health department if you miss your second dose. What happens if I overdose?   An overdose of this vaccine is unlikely to occur. What should I avoid after receiving this vaccine? Avoid receiving other vaccines without first seeking medical advice. What are the possible side effects of this vaccine? Get emergency medical help if you have signs of an allergic reaction: hives; fast heartbeats, feeling weak or dizzy; difficult breathing; swelling of your face, lips, tongue, or throat. A severe allergic reaction is more likely to occur within a few minutes to 1 hour after you receive the vaccine. You will be treated quickly if you have an allergic reaction. You should not receive a second vaccine if you had a life-threatening allergic reaction after the first shot. Keep track of any and all side effects you have after receiving this vaccine. When you receive the second injection, you will need to tell the doctor if the previous shot caused any side effects. Fever may be a normal symptom as your body begins to develop immunity to COVID-19. Call your doctor right away if you have:  · a fever that lasts 24 hours or longer;  · headache, body aches, unusual tiredness;  · cough or swollen glands;  · stomach pain, vomiting, diarrhea;  · feeling dizzy or light-headed;  · rapid breathing, fast heartbeats;  · skin rash, eye redness; or  · redness or swelling in your hands or feet. Becoming infected with COVID-19 is much more dangerous to your health than receiving this vaccine. Common side effects may include:  · fever, chills, swollen glands;  · pain, redness, or swelling where the shot was given;  · nausea, not feeling well;  · feeling tired; or  · headache, muscle pain, joint pain. Other side effects, mild or serious, may occur with more widespread use of COVID-19 vaccine. This is not a complete list of side effects and others may occur. Call your doctor for medical advice about side effects. You may report vaccine side effects to the [address] [address] and Human Services at [phone].   What other drugs will affect this vaccine? Before receiving this vaccine, tell your doctor about all other vaccines you have recently received. Also tell your doctor if you have recently received drugs or treatments that can weaken the immune system. Other drugs may affect COVID-19 vaccine, including prescription and over-the-counter medicines, vitamins, and herbal products. Tell your doctor about all your current medicines and any medicine you start or stop using. Where can I get more information? Your doctor or pharmacist can provide more information about this vaccine. Additional information is available from your local health department or the Centers for Disease Control and Prevention. Remember, keep this and all other medicines out of the reach of children, never share your medicines with others, and use this medication only for the indication prescribed. Every effort has been made to ensure that the information provided by [address] is accurate, up-to-date, and complete, but no guarantee is made to that effect. Drug information contained herein may be time sensitive. [facility] information has been compiled for use by healthcare practitioners and consumers in the [facility] and therefore [facility] does not warrant that uses outside of the [facility] are appropriate, unless specifically indicated otherwise. [facility]'s drug information does not endorse drugs, diagnose patients or recommend therapy. [facility]'s drug information is an informational resource designed to assist licensed healthcare practitioners in caring for their patients and/or to serve consumers viewing this service as a supplement to, and not a substitute for, the expertise, skill, knowledge and judgment of healthcare practitioners. The absence of a warning for a given drug or drug combination in no way should be construed to indicate that the drug or drug combination is safe, effective or appropriate for any given patient.  [facility] does not assume any responsibility for any aspect of healthcare administered with the aid of information [facility] provides. The information contained herein is not intended to cover all possible uses, directions, precautions, warnings, drug interactions, allergic reactions, or adverse effects. If you have questions about the drugs you are taking, check with your doctor, nurse or pharmacist.  Copyright 3757-9635 [address] [place] [address]. Version: 1.01. Revision date: 12/14/2020. Care instructions adapted under license by [facility] ([facility]). If you have questions about a medical condition or this instruction, always ask your healthcare professional. [place] [address] any warranty or liability for your use of this information. Learning About Medical Power of   What is a medical power of ? A medical power of , also called a durable power of  for health care, is one type of the legal forms called advance directives. It lets you name the person you want to make treatment decisions for you if you can't speak or decide for yourself. The person you choose is called your health care agent. This person is also called a health care proxy or health care surrogate. A medical power of  may be called something else in your state. How do you choose a health care agent? Choose your health care agent carefully. This person may or may not be a family member. Talk to the person before you make your final decision. Make sure he or she is comfortable with this responsibility. It's a good idea to choose someone who:  · Is at least 25years old. · Knows you well and understands what makes life meaningful for you. · Understands your Jew and moral values. · Will do what you want, not what he or she wants. · Will be able to make difficult choices at a stressful time. · Will be able to refuse or stop treatment, if that is what you would want, even if you could die.   · Will be firm and confident with health professionals if needed. · Will ask questions to get needed information. · Lives near you or agrees to travel to you if needed. Your family may help you make medical decisions while you can still be part of that process. But it's important to choose one person to be your health care agent in case you aren't able to make decisions for yourself. If you don't fill out the legal form and name a health care agent, the decisions your family can make may be limited. A health care agent may be called something else in your state. Who will make decisions for you if you don't have a health care agent? If you don't have a health care agent or a living will, you may not get the care you want. Decisions may be made by family members who disagree about your medical care. Or decisions may be made by a medical professional who doesn't know you well. In some cases, a  makes the decisions. When you name a health care agent, it is very clear who has the power to make health decisions for you. How do you name a health care agent? You name your health care agent on a legal form. This form is usually called a medical power of . Ask your hospital, state bar association, or office on aging where to find these forms. You must sign the form to make it legal. Some states require you to get the form notarized. This means that a person called a  watches you sign the form and then he or she signs the form. Some states also require that two or more witnesses sign the form. Be sure to tell your family members and doctors who your health care agent is. Where can you learn more? Go to [email or web address] org and sign in to your [employer] account. Enter [id number] in the Big Screen [employer] box to learn more about \"Learning About [address]. \"     If you do not have an account, please click on the \"Sign Up Now\" link.   Current as of: July 17, 2020               Content Version: 12.8  © 0737-1290 Healthwise, Incorporated. Care instructions adapted under license by [facility] ([facility]). If you have questions about a medical condition or this instruction, always ask your healthcare professional. [address] any warranty or liability for your use of this information. Personalized Preventive Plan for Aisha Barrios - 4/19/2021  Medicare offers a range of preventive health benefits. Some of the tests and screenings are paid in full while other may be subject to a deductible, co-insurance, and/or copay. Some of these benefits include a comprehensive review of your medical history including lifestyle, illnesses that may run in your family, and various assessments and screenings as appropriate. After reviewing your medical record and screening and assessments performed today your provider may have ordered immunizations, labs, imaging, and/or referrals for you. A list of these orders (if applicable) as well as your Preventive Care list are included within your After Visit Summary for your review. Other Preventive Recommendations:    · A preventive eye exam performed by an eye specialist is recommended every 1-2 years to screen for glaucoma; cataracts, macular degeneration, and other eye disorders. · A preventive dental visit is recommended every 6 months. · Try to get at least 150 minutes of exercise per week or 10,000 steps per day on a pedometer . · Order or download the FREE \"Exercise & Physical Activity: Your Everyday Guide\" from [employer]. Call 7-980.554.4835 or search [employer] online. · You need 5243-3379 mg of calcium and 2564-3584 IU of vitamin D per day.  It is possible to meet your calcium requirement with diet alone, but a vitamin D supplement is usually necessary to meet this goal.  · When exposed to the sun, use a sunscreen that protects against both UVA and UVB radiation with an SPF of 30 or greater. Reapply every 2 to 3 hours or after sweating, drying off with a towel, or swimming. · Always wear a seat belt when traveling in a car. Always wear a helmet when riding a bicycle or motorcycle.

## 2021-04-27 ENCOUNTER — CARE COORDINATION (OUTPATIENT)
Dept: CARE COORDINATION | Age: 86
End: 2021-04-27

## 2021-04-27 DIAGNOSIS — Z71.89 ADVANCED CARE PLANNING/COUNSELING DISCUSSION: Primary | ICD-10-CM

## 2021-04-27 SDOH — ECONOMIC STABILITY: FOOD INSECURITY: WITHIN THE PAST 12 MONTHS, THE FOOD YOU BOUGHT JUST DIDN'T LAST AND YOU DIDN'T HAVE MONEY TO GET MORE.: NEVER TRUE

## 2021-04-27 SDOH — ECONOMIC STABILITY: FOOD INSECURITY: WITHIN THE PAST 12 MONTHS, YOU WORRIED THAT YOUR FOOD WOULD RUN OUT BEFORE YOU GOT MONEY TO BUY MORE.: NEVER TRUE

## 2021-04-27 SDOH — HEALTH STABILITY: MENTAL HEALTH
STRESS IS WHEN SOMEONE FEELS TENSE, NERVOUS, ANXIOUS, OR CAN'T SLEEP AT NIGHT BECAUSE THEIR MIND IS TROUBLED. HOW STRESSED ARE YOU?: NOT AT ALL

## 2021-04-27 SDOH — SOCIAL STABILITY: SOCIAL NETWORK: HOW OFTEN DO YOU ATTEND CHURCH OR RELIGIOUS SERVICES?: NEVER

## 2021-04-27 SDOH — SOCIAL STABILITY: SOCIAL NETWORK
DO YOU BELONG TO ANY CLUBS OR ORGANIZATIONS SUCH AS CHURCH GROUPS UNIONS, FRATERNAL OR ATHLETIC GROUPS, OR SCHOOL GROUPS?: NO

## 2021-04-27 SDOH — SOCIAL STABILITY: SOCIAL NETWORK: ARE YOU MARRIED, WIDOWED, DIVORCED, SEPARATED, NEVER MARRIED, OR LIVING WITH A PARTNER?: WIDOWED

## 2021-04-27 SDOH — HEALTH STABILITY: MENTAL HEALTH: HOW OFTEN DO YOU HAVE A DRINK CONTAINING ALCOHOL?: NEVER

## 2021-04-27 SDOH — ECONOMIC STABILITY: TRANSPORTATION INSECURITY
IN THE PAST 12 MONTHS, HAS THE LACK OF TRANSPORTATION KEPT YOU FROM MEDICAL APPOINTMENTS OR FROM GETTING MEDICATIONS?: NO

## 2021-04-27 SDOH — SOCIAL STABILITY: SOCIAL NETWORK: HOW OFTEN DO YOU GET TOGETHER WITH FRIENDS OR RELATIVES?: MORE THAN THREE TIMES A WEEK

## 2021-04-27 SDOH — SOCIAL STABILITY: SOCIAL NETWORK
IN A TYPICAL WEEK, HOW MANY TIMES DO YOU TALK ON THE PHONE WITH FAMILY, FRIENDS, OR NEIGHBORS?: MORE THAN THREE TIMES A WEEK

## 2021-04-27 NOTE — CARE COORDINATION
Ambulatory Care Coordination Note  4/28/2021  CM Risk Score: 0  Charlson 10 Year Mortality Risk Score: 47%     ACC: Byron Gamble, RN    Summary Note:     PHone call to patient today in attempt to reach out for Care coordination assessment:   -PCP Referred.   -Spoke to patient directly    Chart Reviewed  For history:   Patient Active Problem List   Diagnosis    Left carotid bruit    Corn of foot    Family history of heart disease    Chronic cough    Acquired deviated nasal septum    Paroxysmal SVT (supraventricular tachycardia) ([address]     AWV with pcp 04/19/21:     (Reviewed PCP notes)  1)Needs assistance with dentist arrangement -possible [address] [place] [address] clinic in [place] has several broken teeth that need pulled. 2)Also willing to do living will and durable POA with . Please arrange appt. 3)Covid 19 vaccine encouraged pt needs contact number. Thinking about getting it. Patient follows with [facility] : Dr. Ester Hopkins    1)Dentist in [facility]:   -patient is in need of a dentist.   -POssible [address] [place] [address] clinic in [facility] to try and get her teeth fixed.   -will call and attempt to help patient find one. 2)Advance Care Planning     General Advance Care Planning (ACP) Conversation    Date of Conversation: 4/27/2021  Conducted with: Patient with Decision Making Capacity    Healthcare Decision Maker:      Primary Decision Maker: Troy Salcedo - Child - [phone]    Click here to complete [address] including selection of the Healthcare Decision Maker Relationship (ie \"Primary\")  Today we referred to ACP Clinical Specialist for assistance. Content/Action Overview:  Has NO ACP documents/care preferences - refer to ACP Clinical Specialist    Length of Voluntary ACP Conversation in minutes:  <16 minutes (Non-Billable)    Byron Gamble     3)Covid 19 Vaccine:   -Discussed getting the vaccine.   -Patient \"does not want to get\".      4)Discussed hOme needs;   -patient, \"doing all her own cooking/cleaning\". -her, \"son fixes anything that breaks\". -Doing, \"her own meals\". -Transportation: patient drives herself locally. -Reminded patient of [employer] if ever need      5)Patient denies needing any  Help with social needs   -Feels, Carlitos Juarez is handling things at home well\". -If, \"needs help and cant do it, her son takes care of it for her\". 6)Trouble sleeping:   -denies wanting to take anything even if over the counter.  -Reports, \"she does lay awake a long time some nights\". -But, 'does not want to take anything for this\". 7)medications reviewed:   -Takes Excedrin for back pain. Does not need every day. 8)Boost: States, \"she drinks this one time daily for the protein. (will refer to dietician)  -pays $9.69/6 pack. -will see if dietician can assist with coupons. -patient is agreeable to dietician referral.  -patient agreeable that she, \"could use the education on a healthy well balanced diet\". General Assessment    Do you have any symptoms that are causing concern?: No         Interventions:   -referral ACP Team  -Referral Dietician; assistance with Supplemental drinks  Mailin)[facility], right place, Right Time  2)[facility] in clinic hours    PLAN:  -will follow back up with patient next week  -sending plan of care approval  AcP Documents: Did ACP TEam reach out to patient? Dietician: new referral: did she talk to Dietician? Dietician will send coupons  -Follow up Dentist: Did she find one? Covid 19 Vaccine: Did she change her mind about getting? -DTap and Shingles vaccine also due    Education Reviewed Today: See Module for details. Goals Addressed                 This Visit's Progress       Care Coordination     Conditions and Symptoms        I will schedule office visits, as directed by my provider. I will keep my appointment or reschedule if I have to cancel. I will notify my provider of any barriers to my plan of care.   I will notify my provider of any symptoms that indicate a worsening of my condition. Barriers: lack of support and overwhelmed by complexity of regimen  Plan for overcoming my barriers:   -patient will work with [facility]   -patient will work with [facility] Triond Conrath  -patient will complete ACP Documents  -patient will take medications as prescribed.   -patient will keep scheduled appointments  Confidence: 8/10  Anticipated Goal Completion Date: 07/20/21                Ambulatory Care Coordination Assessment    Care Coordination Protocol  Week 1 - Initial Assessment     Do you have all of your prescriptions and are they filled?: Yes  Barriers to medication adherence: None  Are you able to afford your medications?: Yes  How often do you have trouble taking your medications the way you have been told to take them?: I always take them as prescribed. Do you have Home O2 Therapy?: No      Ability to seek help/take action for Emergent Urgent situations i.e. fire, crime, inclement weather or health crisis. : Independent  Ability to ambulate to restroom: Independent  Ability handle personal hygeine needs (bathing/dressing/grooming): Independent  Ability to manage Medications: Independent  Ability to prepare Food Preparation: Independent  Ability to maintain home (clean home, laundry): Independent  Ability to drive and/or has transportation: Independent  Ability to do shopping: Independent  Ability to manage finances: Independent  Is patient able to live independently?: Yes     Current Housing: Private Residence           Frequent urination at night?: No  Do you use rails/bars?: No  Do you have a non-slip tub mat?: No     Are you experiencing loss of meaning?: No  Are you experiencing loss of hope and peace?: No     Suggested Interventions and Community Resources                  Prior to Admission medications    Medication Sig Start Date End Date Taking?  Authorizing Provider   glucosamine-chondroitin 500-400 MG CAPS Take 1 capsule by mouth daily Yes Historical Provider, MD   aspirin-acetaminophen-caffeine (Keegan Marquez) 679-938-17 MG per tablet Take 1 tablet by mouth every 6 hours as needed for Headaches   Yes Historical Provider, MD   UNABLE TO [address] promise   Yes Historical Provider, MD   Nutritional Supplements (BOOST PO) Take by mouth   Yes Historical Provider, MD   dilTIAZem (CARDIZEM 12 HR) 90 MG extended release capsule Take 1 capsule by mouth daily 7/13/20  Yes Alea Carson MD   Cholecalciferol (VITAMIN D3) 125 MCG (5000 UT) TABS Take 5,000 Units by mouth daily Indications: [employer] organic cocnut oil   Yes Historical Provider, MD   calcium carbonate 600 MG TABS tablet Take 1 tablet by mouth daily   Yes Historical Provider, MD   Multiple Vitamins-Minerals (THERAPEUTIC MULTIVITAMIN-MINERALS) tablet Take 1 tablet by mouth daily   Yes Historical Provider, MD       Future Appointments   Date Time Provider Shana Brandy   7/12/2021  8:00 AM MD Santhosh Lea [facility]   10/25/2021  1:00 PM ANNA MARIE Carrion - [facility]

## 2021-04-28 ENCOUNTER — CARE COORDINATION (OUTPATIENT)
Dept: CARE COORDINATION | Age: 86
End: 2021-04-28

## 2021-04-28 RX ORDER — SODIUM PHOSPHATE,MONO-DIBASIC 19G-7G/118
1 ENEMA (ML) RECTAL DAILY
COMMUNITY

## 2021-04-28 RX ORDER — SODIUM PHOSPHATE,MONO-DIBASIC 19G-7G/118
1 ENEMA (ML) RECTAL DAILY
COMMUNITY
End: 2021-04-28 | Stop reason: SDUPTHER

## 2021-04-28 SDOH — ECONOMIC STABILITY: HOUSING INSECURITY
IN THE LAST 12 MONTHS, WAS THERE A TIME WHEN YOU DID NOT HAVE A STEADY PLACE TO SLEEP OR SLEPT IN A SHELTER (INCLUDING NOW)?: NO

## 2021-04-28 SDOH — ECONOMIC STABILITY: INCOME INSECURITY: IN THE LAST 12 MONTHS, WAS THERE A TIME WHEN YOU WERE NOT ABLE TO PAY THE MORTGAGE OR RENT ON TIME?: NO

## 2021-04-28 SDOH — ECONOMIC STABILITY: HOUSING INSECURITY: IN THE LAST 12 MONTHS, HOW MANY PLACES HAVE YOU LIVED?: 1

## 2021-04-28 NOTE — CARE COORDINATION
Phone call to patient herself:   -Discussed dental care. :   1) coverage review:   -patient to, \"call  to verify if they would cover dental care, and will then update this nurse regarding\". 2)[facility]  -PHone number reviewed with patient:  [phone]  -Patient to call and see if she can get scheduled there. 3)Reviewed Nutritional supplements:   -Reports, \"Boost: is what she likes\". -Discussed samples of Glucerna at [facility]. -patient, Clifton Ramachandran likes chocolate\". -Informs, 'she also drinks chocolate milk\". -Did send message to pcp office to see if they have chocolate glucerna for patient.   -Also sent referral to [facility] dietician to reach out to patient to discuss diet, proper nutritional supplements, send coupons, and provide samples to patient. PLAN:  -will follow back up with patient next week if do not hear back from patient sooner regardin)Dental Care: did she check with ? Did she call [facility]? 2)AcP Documents: did ACP Team call her to discuss? 3)Covid 19 vaccine: has she reconsidered? 4)DTap and shingles vaccine both due  5)Dietician follow up: questions? Did dietician call?

## 2021-04-28 NOTE — CARE COORDINATION
Registered Dietitian Initial Assessment for Verna Mckeon 89  April 28, 2021    Initial Referral Reason: history of HTN/poor eating habits    Patient Care Team:  ANNA MARIE Clemons CNP as PCP - General  ANNA MARIE Clemons CNP as PCP - [facility] Empaneled Provider  Zuleika Zamora RN as Ambulatory Care Manager  Maddison Berry RD, LD as Dietitian    Patient Active Problem List   Diagnosis    Left carotid bruit    Corn of foot    Family history of heart disease    Chronic cough    Acquired deviated nasal septum    Paroxysmal SVT (supraventricular tachycardia) (HCC)       Current Outpatient Medications   Medication Sig Dispense Refill    glucosamine-chondroitin 500-400 MG CAPS Take 1 capsule by mouth daily      aspirin-acetaminophen-caffeine (EXCEDRIN MIGRAINE) 250-250-65 MG per tablet Take 1 tablet by mouth every 6 hours as needed for Headaches      UNABLE TO FIND Eye promise      Nutritional Supplements (BOOST PO) Take by mouth      dilTIAZem (CARDIZEM 12 HR) 90 MG extended release capsule Take 1 capsule by mouth daily 90 capsule 3    Cholecalciferol (VITAMIN D3) 125 MCG (5000 UT) TABS Take 5,000 Units by mouth daily Indications: VIVA [employer]URALS organic cocnut oil      calcium carbonate 600 MG TABS tablet Take 1 tablet by mouth daily      Multiple Vitamins-Minerals (THERAPEUTIC MULTIVITAMIN-MINERALS) tablet Take 1 tablet by mouth daily       No current facility-administered medications for this visit.           Visit for:  Obesity/Weight loss  Diabetes:  Hypertension: X  Hyperlipidemia:  Other:     Anthropometric Measurements:  HT: 5'4\"  Weight: 128  IBW: 120 + or - 10%  BMI: 21    Biochemical Data, Medical Tests and Procedures:    No results found for: LABA1C  No results found for: EAG    Lab Results   Component Value Date    CHOL 150 07/08/2020    CHOL 174 05/09/2019    CHOL 174 09/01/2018     Lab Results   Component Value Date    TRIG 52 07/08/2020    TRIG 61 05/09/2019 TRIG 66 09/01/2018     Lab Results   Component Value Date    HDL 55 07/08/2020    HDL 66 05/09/2019    HDL 62 09/01/2018     Lab Results   Component Value Date    LDLCHOLESTEROL 85 07/08/2020    LDLCHOLESTEROL 96 05/09/2019    LDLCHOLESTEROL 99 09/01/2018     Lab Results   Component Value Date    VLDL NOT REPORTED 07/08/2020    VLDL NOT REPORTED 05/09/2019    VLDL NOT REPORTED 09/01/2018     Lab Results   Component Value Date    CHOLHDLRATIO 2.7 07/08/2020    CHOLHDLRATIO 2.6 05/09/2019    CHOLHDLRATIO 2.8 09/01/2018       Lab Results   Component Value Date    WBC 5.2 07/08/2020    HGB 13.1 07/08/2020    HCT 38.5 07/08/2020    MCV 93.0 07/08/2020     07/08/2020       Lab Results   Component Value Date    CREATININE 0.86 07/08/2020    BUN 16 07/08/2020     07/08/2020    K 4.3 07/08/2020     07/08/2020    CO2 26 07/08/2020         NUTRITION DIAGNOSIS    #1 Problem  Food and nutrition-related knowledge deficit       Etiology  related to low sodium guidelines/healthy eating       Signs/Symptoms  as evidenced by patient unaware of foods to avoid/limit high in sodium    NUTRITION INTERVENTION  Nutrition Prescription:    low sodium providing 1500 kcals/day   Protein needs: 58gms/d  Fluid needs: 1800cc/day      Patient Goals:  1. Discussed avoiding canned, prepackaged foods, processed meats and cheese. Discussed processed meats in detail to avoid- sausage, hubbard, bologna, ham, ect- patient was eating some of these.  Goal is <2gm of sodium/day. 2. Discussed shopping the outer rim of the grocery store where fresher foods are found. Patient is adding salt to foods- discussed seasonings that can be used that do not contain salt. 3. Discussed using Boost daily, patient relays she drinks one Boost high protein/day, discussed increasing to twice a day on days she does not have an appetite.  Offered patient coupons for Ensure, she declined relays she only likes Boost.   4. Discussed general healthy eating guidelines- making sure she is having a protein source at each meal- went over protein sources-eggs, meat, peanut butter, cottage cheese, ect, having a fruit or vegetable with each meal and a carbohydrate. Patient relays she does cook for herself admits she does eat frozen meals weekly- 1-2 times encouraged her to check the sodium content and choose ones with 500mg or less if possible. Nutrition Intervention Need:  Initial/Brief:  Comprehensive Nutrition Education: X  Coordination of other care during nutrition care:    Monitoring and Evaluation:  Ability to plan meals/snacks:  Ability to select healthy foods/meals: X  Food and Nutrition Knowledge: X  Self Monitoring:  Physical Activity:  Energy intake:  Fluid/beverage intake:  Fat/chol intake:  Carb intake: Other: sodium intake: X    Plan:  1. Will continue to educate patient on foods, seasonings and sauces high in sodium to avoid, reading food labels, easy  ways to cook foods, using Boost daily, healthy eating-protein sources, using plate method at meals.  Patient will be mailed nutrition information on all the above discussed. 2. Will follow up with patient in 2-3 weeks to review nutrition information and answer questions.     DAVID Alvarado

## 2021-04-28 NOTE — CARE COORDINATION
Phone call back to patient to inform that there is some chocolate Glucerna at the office if she wants to stop and get some.   -requested staff to take some to [facility] office next week when they are over there.          Future Appointments   Date Time Provider Shana Nava   7/12/2021  8:00 AM [facility]   10/25/2021  1:00 PM [facility]IE [facility]

## 2021-04-28 NOTE — TELEPHONE ENCOUNTER
I agree with the Care Coordinator's Plan of Care     I have some glucerna and clear ensure at [facility] office we could bring her some to ana unless she wants to come here to  some.

## 2021-04-29 ENCOUNTER — CARE COORDINATION (OUTPATIENT)
Dept: CARE COORDINATION | Age: 86
End: 2021-04-29

## 2021-04-29 ENCOUNTER — CLINICAL DOCUMENTATION (OUTPATIENT)
Dept: CASE MANAGEMENT | Age: 86
End: 2021-04-29

## 2021-04-29 NOTE — ACP (ADVANCE CARE PLANNING)
Advance Care Planning    Ambulatory ACP Specialist Patient Outreach    Date:  4/29/2021  ACP Specialist:  Leann Stoll    Outreach call to patient in follow-up to ACP Specialist referral from: ANNA MARIE Richey CNP    [x] PCP  [] Provider   [] Ambulatory Care Management [] Other for Reason:    [x] Continued Conversation for ACP decision making / Goals of Care  [] Code Status Discussion  [] Completion of Adv Directive  [] Completion of Portable DNR order  [] Other (Specify)    Date Referral Received:4/29/2021     Today's Outreach:  [x] First   [] Second  [] Third                               Third outreach made by [x]  phone  [] email []   [facility]     Intervention:  [] Spoke with Patient  [x] Left VM requesting return call      Outcome: Will await return call from pt regarding ACP meeting ot be scheduled. Next Step:   [] ACP scheduled conversation  [x] Outreach again in one week               [] Email / Mail ACP Info Sheets  [] Email / Mail Advance Directive            [] Close Referral. Routing closure to referring provider/staff and to ACP Specialist .      Thank you for this referral.

## 2021-05-06 ENCOUNTER — CARE COORDINATION (OUTPATIENT)
Dept: CARE COORDINATION | Age: 86
End: 2021-05-06

## 2021-05-06 NOTE — CARE COORDINATION
Ambulatory Care Coordination Note  5/6/2021  CM Risk Score: 0  Charlson 10 Year Mortality Risk Score: 47%     ACC: Edvin Anastasia, RN    Summary Note:     Phone call to patient to follow up with her care:   -spoke to patient herself    1)Dietician goals reviewed:   -patient working on her diet  -patient, \"does look at [employer], with the lowest amount of sodium she can find\". Patient Goals:  1. Discussed avoiding canned, prepackaged foods, processed meats and cheese. Discussed processed meats in detail to avoid- sausage, hubbard, bologna, ham, ect- patient was eating some of these.  Goal is <2gm of sodium/day. 2. Discussed shopping the outer rim of the grocery store where fresher foods are found. Patient is adding salt to foods- discussed seasonings that can be used that do not contain salt. 3. Discussed using Boost daily, patient relays she drinks one Boost high protein/day, discussed increasing to twice a day on days she does not have an appetite. Offered patient coupons for Ensure, she declined relays she only likes Boost.   4. Discussed general healthy eating guidelines- making sure she is having a protein source at each meal- went over protein sources-eggs, meat, peanut butter, cottage cheese, ect, having a fruit or vegetable with each meal and a carbohydrate. Patient relays she does cook for herself admits she does eat frozen meals weekly- 1-2 times encouraged her to check the sodium content and choose ones with 500mg or less if possible. 2)AcP Specialist reached out to patient: has she called her back?   -discussed with patient;      3)-Follow up Dentist: Did she find one?   -Informs, \"that she has to call back after the 8th of the month to see about scheduling for the following month\". 4(Discussed [employer] coverage: The dentist office said they would     4)Covid 19 Vaccine: Did she change her mind about getting?   -patient \"not planning on getting it done\".      5)DTap and Shingles vaccine also due   -patient will consider these and let this nurse know. General Assessment    Do you have any symptoms that are causing concern?: No         Care Coordination Plan of Care: This nurse Care Coordinator will  - will follow up with patient in 1-2 weeks  Dental care: did she get an appointment? Vaccines: Covid, shingles, Dtap: do you want to get scheduled for any? [employer] coverage: did you hear from them?   -ACP Team specialist: did you call her back? Diet: how are you doing with your diet?     -  Care Coordination Interventions    Program Enrollment: Rising Risk  Referral from Primary Care Provider: Yes  Suggested Interventions and Community Resources  Fall Risk Prevention: Declined  Meals on Wheels: Declined  Medication Assistance Program: Declined  Medi Set or Pill Pack: Declined  Registered Dietician: Completed (Comment: Referred to [facility] dietician)  Social Work: Declined  Transportation Support: Declined  Zone Management Tools:  (Comment: n/a)  Other Services or Interventions: Referred to RANJEET Todd Specialty Team     Education Documentation  Nutrition (eg:  Build/Reinforce basic nutrition knowledge related to Disease/Health Issues), taught by Royal Garcia RN at 5/6/2021 12:26 PM.  Learner: Patient  Readiness: Acceptance  Method: Explanation  Response: Verbalizes Understanding, Needs Reinforcement  Comment: reviewed diet recommendations from dietician. patient denies questions. discussed vaccines due. pt to call AcP Specialty back to assist in paperwork completion. pt to call and get scheduled for dental assessment [facility]    General medication information, taught by Royal Garcia RN at 5/6/2021 12:26 PM.  Learner: Patient  Readiness: Acceptance  Method: Explanation  Response: Verbalizes Understanding, Needs Reinforcement  Comment: reviewed diet recommendations from dietician. patient denies questions. discussed vaccines due.  pt to call AcP Specialty back to assist in clinic    Education Comments  No comments found. Goals Addressed                 This Visit's Progress       Care Coordination     Conditions and Symptoms   Improving     I will schedule office visits, as directed by my provider. I will keep my appointment or reschedule if I have to cancel. I will notify my provider of any barriers to my plan of care. I will notify my provider of any symptoms that indicate a worsening of my condition. Barriers: lack of support and overwhelmed by complexity of regimen  Plan for overcoming my barriers:   -patient will work with [facility]   -patient will work with [facility] [employer]  -patient will complete ACP Documents  -patient will take medications as prescribed.   -patient will keep scheduled appointments  Confidence: 8/10  Anticipated Goal Completion Date: 07/20/21              Prior to Admission medications    Medication Sig Start Date End Date Taking?  Authorizing Provider   glucosamine-chondroitin 500-400 MG CAPS Take 1 capsule by mouth daily    Historical Provider, MD   aspirin-acetaminophen-caffeine (EXCEDRIN MIGRAINE) 256-408-30 MG per tablet Take 1 tablet by mouth every 6 hours as needed for Headaches    Historical Provider, MD   UNABLE TO [address] promise    Historical Provider, MD   Nutritional Supplements (BOOST PO) Take by mouth    Historical Provider, MD   dilTIAZem (CARDIZEM 12 HR) 90 MG extended release capsule Take 1 capsule by mouth daily 7/13/20   Zee Del Cid MD   Cholecalciferol (VITAMIN D3) 125 MCG (5000 UT) TABS Take 5,000 Units by mouth daily Indications: VIV[employer] organic cocnut oil    Historical Provider, MD   calcium carbonate 600 MG TABS tablet Take 1 tablet by mouth daily    Historical Provider, MD   Multiple Vitamins-Minerals (THERAPEUTIC MULTIVITAMIN-MINERALS) tablet Take 1 tablet by mouth daily    Historical Provider, MD       Future Appointments   Date Time Provider Shana Nava   7/12/2021  8:00 AM MD Shasta Shirley [facility]   10/25/2021  1:00 PM ANNA MARIE Granados - [facility]

## 2021-05-07 ENCOUNTER — CLINICAL DOCUMENTATION (OUTPATIENT)
Dept: CASE MANAGEMENT | Age: 86
End: 2021-05-07

## 2021-05-07 NOTE — ACP (ADVANCE CARE PLANNING)
Advance Care Planning    Ambulatory ACP Specialist Patient Outreach    Date:  5/7/2021  ACP Specialist:  Edman Skiff    Outreach call to patient in follow-up to ACP Specialist referral from: ANNA MARIE Templeton CNP    [x] PCP  [] Provider   [] Ambulatory Care Management [] Other for Reason:    [x] Continued Conversation for ACP decision making / Goals of Care  [] Code Status Discussion  [] Completion of Adv Directive  [] Completion of Portable DNR order  [] Other (Specify)    Date Referral Received:4/29/2021    Today's Outreach:  [] First   [x] Second  [] Third                Outreach made by [x]  phone  [] email []   [employer]     Intervention:  [x] Spoke with Patient  [] Left VM requesting return call      Outcome:Pt requests SW to mail the documents to her due to having to babysit her grandson and not being able to drive over. SW to call pt back in a week or 2 to discuss any questions and see if she needs assistance in completing. Next Step:   [] ACP scheduled conversation  [x] Outreach again in one to two weeks               [] Email / Mail ACP Info Sheets  [x] Email / Mail Advance Directive            [] Close Referral. Routing closure to referring provider/staff and to ACP Specialist .      Thank you for this referral.

## 2021-05-14 ENCOUNTER — CARE COORDINATION (OUTPATIENT)
Dept: CARE COORDINATION | Age: 86
End: 2021-05-14

## 2021-05-14 NOTE — CARE COORDINATION
Dental care: pt has to call next Monday at 7 am to schedule her dental appt. She states she does not have any dental insurance. Vaccines: pt does not want the COVID vaccine, but will like to get the shingles and Dtap vaccine. Writer advised her to call PCP office to schedule appt for these 2 vaccines. [employer] coverage: pt states she was not able to get dental coverage from [employer]. -ACP Team specialist: pt states she did receive the ACP documents. Pt has not went over them. She was encourage to read them, complete and reach out to ACP specialist for any questions. Diet:pt mentions her diet is going well, she does not have any concerns today. Pt was reminder of appts listed below. She will call PCP office to schedule a sooner appt for vaccines. -writer will update RN ACM.     FU appts/Provider:    Future Appointments   Date Time Provider Shana Nava   7/12/2021  8:00 AM MD Joaquin Martinez [facility]   10/25/2021  1:00 PM [facility]

## 2021-05-19 ENCOUNTER — CARE COORDINATION (OUTPATIENT)
Dept: CARE COORDINATION | Age: 86
End: 2021-05-19

## 2021-05-21 ENCOUNTER — CARE COORDINATION (OUTPATIENT)
Dept: CARE COORDINATION | Age: 86
End: 2021-05-21

## 2021-05-21 NOTE — CARE COORDINATION
Ambulatory Care Coordination Note  2021  CM Risk Score: 0  Charlson 10 Year Mortality Risk Score: 47%     ACC: Byron Gamble RN    Summary Note:     PHone call attempt to reach patien:   -left vm messaged, requested phone call back:     Wanting to follow up regardin)Dental care: pt was to call next Monday at 7 am to schedule her dental appt. 2)Vaccines: pt does not want the COVID vaccine, but will like to get the shingles and Dtap vaccine: did she call pcp ?      3)-ACP documents: has she looked at these? Questions?    4)Diet:how is she doing with her diet?      PLAN:  -will await call back from patient, and if no return call; will attempt to reach back out to patient next week      Care Coordination Interventions    Program Enrollment: Rising Risk  Referral from Primary Care Provider: Yes  Suggested Interventions and Community Resources  Fall Risk Prevention: Declined  Meals on Wheels: Declined  Medication Assistance Program: [address] or Pill Pack: Declined  Registered Dietician: Completed (Comment: Referred to [facility])  Social Work: Declined  Transportation Support: Declined  Zone Management Tools:  (Comment: n/a)  Other Services or Interventions: Referred to ACP Specialty Team           Prior to Admission medications    Medication Sig Start Date End Date Taking?  Authorizing Provider   Nutritional Supplements (GLUCERNA) LIQD Take 1 Bottle by mouth daily as needed (snack/nutritional supplement) 21   ANNA MARIE Wong - CNP   glucosamine-chondroitin 500-400 MG CAPS Take 1 capsule by mouth daily    Historical Provider, MD   aspirin-acetaminophen-caffeine (EXCEDRIN MIGRAINE) 370-769-95 MG per tablet Take 1 tablet by mouth every 6 hours as needed for Headaches    Historical Provider, MD   UNABLE TO [address] promise    Historical Provider, MD   Nutritional Supplements (BOOST PO) Take by mouth    Historical Provider, MD   dilTIAZem (CARDIZEM 12 HR) 90 MG extended release capsule Take 1 capsule by mouth daily 7/13/20   Anayeli Paula MD   Cholecalciferol (VITAMIN D3) 125 MCG (5000 UT) TABS Take 5,000 Units by mouth daily Indications: VIV[employer]UR[employer] organic cocnut oil    Historical Provider, MD   calcium carbonate 600 MG TABS tablet Take 1 tablet by mouth daily    Historical Provider, MD   Multiple Vitamins-Minerals (THERAPEUTIC MULTIVITAMIN-MINERALS) tablet Take 1 tablet by mouth daily    Historical Provider, MD       Future Appointments   Date Time Provider Shana Nava   7/12/2021  8:00 AM MD Joan Dyer [facility]   10/25/2021  1:00 PM ANNA MARIE Taylor - [facility] [place] [facility]

## 2021-05-26 ENCOUNTER — CARE COORDINATION (OUTPATIENT)
Dept: CARE COORDINATION | Age: 86
End: 2021-05-26

## 2021-05-26 NOTE — CARE COORDINATION
Ambulatory Care Coordination Note  2021  CM Risk Score: 0  Charlson 10 Year Mortality Risk Score: 47%     ACC: Ellie Leland, RN    Summary Note:     Phone call today to patient to follow up with her care:     follow up regardin)Dental care: pt was to call next Monday at 7 am to schedule her dental appt. -[facility] only do once a month, she got ahold of them; but she was not able to get through to scheduling in time to mychal gonzales.  -Has to call on  to schedule.   -This is the third [place] clinic; she is only able to call once a month; and they make appointments for the whole month, she just has not had good luck. -Offered to refer to  to assist or look into different clinic? Patient denies. Deidre Garcia, 'she is babysitting currently anyways for her grandson for a couple more weeks, so it will work better to just wait\". 2)Vaccine: pt does not want the COVID vaccine,   Educated patient about risk for severe COVID-19 due to risk factors according to CDC guidelines. Discussed COVID vaccination status Yes. Education provided on COVID-19 vaccination as appropriate. Discussed exposure protocols and quarantine with CDC Guidelines. Patient was given an opportunity to verbalize any questions and concerns and agrees to contact ACM or health care provider for questions related to their healthcare.     3)-ACP documents: has she looked at these? Questions?   -informMiki horta has them, but has been too busy to review\". -she, Plans to look these over with her 2 daughters who are coming up to visit from out of state\".    4)Diet:how is she doing with her diet? Reviewed Dietician plan:  Plan:  1.  Will continue to educate patient on foods, seasonings and sauces high in sodium to avoid, reading food labels, easy  ways to cook foods, using Boost daily, healthy eating-protein sources, using plate method at meals.  Patient will be mailed nutrition information on all the above

## 2021-06-02 ENCOUNTER — CARE COORDINATION (OUTPATIENT)
Dept: CARE COORDINATION | Age: 86
End: 2021-06-02

## 2021-06-02 NOTE — CARE COORDINATION
Nutrition Care Coordinator Follow-Up visit:    Food Recall: eating 2-3 meals/d    Activity Level:  Sedentary: X  Lightly Active: Moderately Active:  Very Active:    Adult BMI:  Underweight (below 18.5)  Normal Weight (18.5-24. 9) X  Overweight (25-29. 9)  Obese (30-39. 9)  Morbidly Obese (>40)    Weight Change: no change    Plan:  Plan was established with patient:  Boost BID: X  Increase protein intake: X    Monitoring: Will monitor weight: X  Will monitor adherence to meal plan: Will monitor adherence to exercise plan: Will monitor HGA1c:    Handouts Provided :  High calorie/protein foods: X  Low sodium guidelines: X    Goals:  Patient goals set:  1. Reviewed avoiding canned, prepackaged foods, processed meats and cheese. Reviewed processed meats in detail to avoid- sausage, hubbard, bologna, ham, ect- patient was eating some of these.  Goal is <2gm of sodium/day. 2. Reviewed shopping the outer rim of the grocery store where fresher foods are found. Patient is adding salt to foods- reviewed seasonings that can be used that do not contain salt. 3. Reviewed using Boost daily, patient relays she drinks one Boost high protein/day, reviewed increasing to twice a day on days she does not have an appetite. 4. Reviewed general healthy eating guidelines- making sure she is having a protein source at each meal- reviewed protein sources-eggs, meat, peanut butter, cottage cheese, ect, having a fruit or vegetable with each meal and a carbohydrate. Reviewed checking the sodium content on frozen meals and choosing ones with 500mg or less if possible. Patient relays she got nutrition information and read through it. She is watching her sodium intake- reading labels and drinking Boost daily. Patient had no questions at this time. Will follow up in 3-4 weeks to review and answer questions.     Radhaurisheela Malone

## 2021-06-04 ENCOUNTER — CLINICAL DOCUMENTATION (OUTPATIENT)
Dept: CASE MANAGEMENT | Age: 86
End: 2021-06-04

## 2021-06-15 ENCOUNTER — CARE COORDINATION (OUTPATIENT)
Dept: CARE COORDINATION | Age: 86
End: 2021-06-15

## 2021-06-16 NOTE — CARE COORDINATION
Can you please ask pt to  samples glucerna drink available in my office in [facility].      thanks
of any barriers to my plan of care. I will notify my provider of any symptoms that indicate a worsening of my condition. Barriers: lack of support and overwhelmed by complexity of regimen  Plan for overcoming my barriers:   -patient will work with ACM (Completed)  -patient will work with [facility] dietician(Completed)  -patient will complete ACP Documents(patient has these, and family is assisting; patient has  phone number to call back if would like help getting these completed)  -patient will take medications as prescribed. (completed)  -patient will keep scheduled appointments(completed)  Confidence: 8/10  Anticipated Goal Completion Date: 07/20/21  Goal completed 06/15/21            Prior to Admission medications    Medication Sig Start Date End Date Taking?  Authorizing Provider   Nutritional Supplements (GLUCERNA) LIQD Take 1 Bottle by mouth daily as needed (snack/nutritional supplement) 5/17/21   ANNA MARIE Medellin CNP   glucosamine-chondroitin 500-400 MG CAPS Take 1 capsule by mouth daily    Historical Provider, MD   aspirin-acetaminophen-caffeine (EXCEDRIN MIGRAINE) 445-215-52 MG per tablet Take 1 tablet by mouth every 6 hours as needed for Headaches    Historical Provider, MD   UNABLE TO FIND Eye promise    Historical Provider, MD   Nutritional Supplements (BOOST PO) Take by mouth    Historical Provider, MD   dilTIAZem (CARDIZEM 12 HR) 90 MG extended release capsule Take 1 capsule by mouth daily 7/13/20   Celia Miner MD   Cholecalciferol (VITAMIN D3) 125 MCG (5000 UT) TABS Take 5,000 Units by mouth daily Indications: [employer] organic cocnut oil    Historical Provider, MD   calcium carbonate 600 MG TABS tablet Take 1 tablet by mouth daily    Historical Provider, MD   Multiple Vitamins-Minerals (THERAPEUTIC MULTIVITAMIN-MINERALS) tablet Take 1 tablet by mouth daily    Historical Provider, MD

## 2021-06-23 ENCOUNTER — CARE COORDINATION (OUTPATIENT)
Dept: CARE COORDINATION | Age: 86
End: 2021-06-23

## 2021-06-23 NOTE — CARE COORDINATION
Spoke with patient who relays she is just leaving for her  Dentist appointment and requested a call back later today.

## 2021-07-02 ENCOUNTER — CARE COORDINATION (OUTPATIENT)
Dept: CARE COORDINATION | Age: 86
End: 2021-07-02

## 2021-07-09 ENCOUNTER — HOSPITAL ENCOUNTER (OUTPATIENT)
Age: 86
Discharge: HOME | End: 2021-07-11
Payer: MEDICARE

## 2021-07-09 ENCOUNTER — HOSPITAL ENCOUNTER (OUTPATIENT)
Age: 86
Discharge: HOME | End: 2021-07-09
Payer: MEDICARE

## 2021-07-09 ENCOUNTER — HOSPITAL ENCOUNTER (OUTPATIENT)
Dept: GENERAL RADIOLOGY | Age: 86
Discharge: HOME | End: 2021-07-11
Payer: MEDICARE

## 2021-07-09 DIAGNOSIS — I11.9 MALIGNANT HYPERTENSIVE HEART DISEASE WITHOUT HEART FAILURE: ICD-10-CM

## 2021-07-09 DIAGNOSIS — I47.1 PAROXYSMAL SUPRAVENTRICULAR TACHYCARDIA (HCC): ICD-10-CM

## 2021-07-09 DIAGNOSIS — E55.9 VITAMIN D DEFICIENCY DISEASE: ICD-10-CM

## 2021-07-09 LAB
ABSOLUTE EOS #: 0.3 K/UL (ref 0–0.4)
ABSOLUTE IMMATURE GRANULOCYTE: ABNORMAL K/UL (ref 0–0.3)
ABSOLUTE LYMPH #: 1.3 K/UL (ref 1–4.8)
ABSOLUTE MONO #: 0.5 K/UL (ref 0–1)
ALBUMIN SERPL-MCNC: 4.3 G/DL (ref 3.5–5.2)
ALBUMIN/GLOBULIN RATIO: ABNORMAL (ref 1–2.5)
ALP BLD-CCNC: 60 U/L (ref 35–104)
ALT SERPL-CCNC: 9 U/L (ref 5–33)
ANION GAP SERPL CALCULATED.3IONS-SCNC: 7 MMOL/L (ref 9–17)
AST SERPL-CCNC: 20 U/L
BASOPHILS # BLD: 1 % (ref 0–2)
BASOPHILS ABSOLUTE: 0 K/UL (ref 0–0.2)
BILIRUB SERPL-MCNC: 0.8 MG/DL (ref 0.3–1.2)
BUN BLDV-MCNC: 14 MG/DL (ref 8–23)
BUN/CREAT BLD: 20 (ref 9–20)
CALCIUM SERPL-MCNC: 9.4 MG/DL (ref 8.6–10.4)
CHLORIDE BLD-SCNC: 102 MMOL/L (ref 98–107)
CHOLESTEROL/HDL RATIO: 3.2
CHOLESTEROL: 177 MG/DL
CO2: 28 MMOL/L (ref 20–31)
CREAT SERPL-MCNC: 0.71 MG/DL (ref 0.5–0.9)
DIFFERENTIAL TYPE: YES
EOSINOPHILS RELATIVE PERCENT: 7 % (ref 0–5)
GFR AFRICAN AMERICAN: >60 ML/MIN
GFR NON-AFRICAN AMERICAN: >60 ML/MIN
GFR SERPL CREATININE-BSD FRML MDRD: ABNORMAL ML/MIN/{1.73_M2}
GFR SERPL CREATININE-BSD FRML MDRD: ABNORMAL ML/MIN/{1.73_M2}
GLUCOSE BLD-MCNC: 93 MG/DL (ref 70–99)
HCT VFR BLD CALC: 40.4 % (ref 36–46)
HDLC SERPL-MCNC: 56 MG/DL
HEMOGLOBIN: 13.6 G/DL (ref 12–16)
IMMATURE GRANULOCYTES: ABNORMAL %
LDL CHOLESTEROL: 106 MG/DL (ref 0–130)
LYMPHOCYTES # BLD: 28 % (ref 15–40)
MAGNESIUM: 2 MG/DL (ref 1.6–2.6)
MCH RBC QN AUTO: 31.6 PG (ref 26–34)
MCHC RBC AUTO-ENTMCNC: 33.8 G/DL (ref 31–37)
MCV RBC AUTO: 93.5 FL (ref 80–100)
MONOCYTES # BLD: 10 % (ref 4–8)
NRBC AUTOMATED: ABNORMAL PER 100 WBC
PATIENT FASTING?: YES
PDW BLD-RTO: 13.6 % (ref 12.1–15.2)
PLATELET # BLD: 228 K/UL (ref 140–450)
PLATELET ESTIMATE: ABNORMAL
PMV BLD AUTO: ABNORMAL FL (ref 6–12)
POTASSIUM SERPL-SCNC: 4.3 MMOL/L (ref 3.7–5.3)
RBC # BLD: 4.32 M/UL (ref 4–5.2)
RBC # BLD: ABNORMAL 10*6/UL
SEG NEUTROPHILS: 54 % (ref 47–75)
SEGMENTED NEUTROPHILS ABSOLUTE COUNT: 2.6 K/UL (ref 2.5–7)
SODIUM BLD-SCNC: 137 MMOL/L (ref 135–144)
TOTAL PROTEIN: 7.1 G/DL (ref 6.4–8.3)
TRIGL SERPL-MCNC: 73 MG/DL
TSH SERPL DL<=0.05 MIU/L-ACNC: 3.51 MIU/L (ref 0.3–5)
VITAMIN D 25-HYDROXY: 82.6 NG/ML (ref 30–100)
VLDLC SERPL CALC-MCNC: NORMAL MG/DL (ref 1–30)
WBC # BLD: 4.7 K/UL (ref 3.5–11)
WBC # BLD: ABNORMAL 10*3/UL

## 2021-07-09 PROCEDURE — 93005 ELECTROCARDIOGRAM TRACING: CPT

## 2021-07-09 PROCEDURE — 83735 ASSAY OF MAGNESIUM: CPT

## 2021-07-09 PROCEDURE — 71046 X-RAY EXAM CHEST 2 VIEWS: CPT

## 2021-07-09 PROCEDURE — 82306 VITAMIN D 25 HYDROXY: CPT

## 2021-07-09 PROCEDURE — 80053 COMPREHEN METABOLIC PANEL: CPT

## 2021-07-09 PROCEDURE — 84443 ASSAY THYROID STIM HORMONE: CPT

## 2021-07-09 PROCEDURE — 80061 LIPID PANEL: CPT

## 2021-07-09 PROCEDURE — 36415 COLL VENOUS BLD VENIPUNCTURE: CPT

## 2021-07-09 PROCEDURE — 85025 COMPLETE CBC W/AUTO DIFF WBC: CPT

## 2021-07-11 LAB
EKG ATRIAL RATE: 55 BPM
EKG P AXIS: 68 DEGREES
EKG P-R INTERVAL: 174 MS
EKG Q-T INTERVAL: 420 MS
EKG QRS DURATION: 92 MS
EKG QTC CALCULATION (BAZETT): 401 MS
EKG R AXIS: 39 DEGREES
EKG T AXIS: 66 DEGREES
EKG VENTRICULAR RATE: 55 BPM

## 2021-07-11 PROCEDURE — 93010 ELECTROCARDIOGRAM REPORT: CPT | Performed by: INTERNAL MEDICINE

## 2021-07-12 ENCOUNTER — OFFICE VISIT (OUTPATIENT)
Dept: CARDIOLOGY CLINIC | Age: 86
End: 2021-07-12
Payer: MEDICARE

## 2021-07-12 VITALS
BODY MASS INDEX: 22.14 KG/M2 | HEART RATE: 74 BPM | OXYGEN SATURATION: 96 % | DIASTOLIC BLOOD PRESSURE: 60 MMHG | WEIGHT: 129 LBS | SYSTOLIC BLOOD PRESSURE: 120 MMHG

## 2021-07-12 DIAGNOSIS — I11.9 MALIGNANT HYPERTENSIVE HEART DISEASE WITHOUT HEART FAILURE: ICD-10-CM

## 2021-07-12 DIAGNOSIS — E55.9 VITAMIN D DEFICIENCY DISEASE: ICD-10-CM

## 2021-07-12 DIAGNOSIS — I47.1 PAROXYSMAL SUPRAVENTRICULAR TACHYCARDIA (HCC): Primary | ICD-10-CM

## 2021-07-12 PROCEDURE — G8420 CALC BMI NORM PARAMETERS: HCPCS | Performed by: INTERNAL MEDICINE

## 2021-07-12 PROCEDURE — G8399 PT W/DXA RESULTS DOCUMENT: HCPCS | Performed by: INTERNAL MEDICINE

## 2021-07-12 PROCEDURE — 4040F PNEUMOC VAC/ADMIN/RCVD: CPT | Performed by: INTERNAL MEDICINE

## 2021-07-12 PROCEDURE — 1123F ACP DISCUSS/DSCN MKR DOCD: CPT | Performed by: INTERNAL MEDICINE

## 2021-07-12 PROCEDURE — 1036F TOBACCO NON-USER: CPT | Performed by: INTERNAL MEDICINE

## 2021-07-12 PROCEDURE — 1090F PRES/ABSN URINE INCON ASSESS: CPT | Performed by: INTERNAL MEDICINE

## 2021-07-12 PROCEDURE — G8427 DOCREV CUR MEDS BY ELIG CLIN: HCPCS | Performed by: INTERNAL MEDICINE

## 2021-07-12 PROCEDURE — 99214 OFFICE O/P EST MOD 30 MIN: CPT | Performed by: INTERNAL MEDICINE

## 2021-07-12 NOTE — LETTER
Va Cee M.D. [address] [place] [address]  [phone]    2021    Jonas Godoy, CNP  [address] [address]      RE:   Andrade Reed  :  1935      Dear Mary Button:    CHIEF COMPLAINT:  1. SVT, well controlled. 2.  Mild hypotension which she tolerates with no lightheadedness, dizziness, syncope or near syncope. HISTORY OF PRESENT ILLNESS:  I had the pleasure of seeing Mrs. Dot Matias in our office on 2021. She is a very pleasant 51-year-old female with a history of SVT. She also has a history of lightheaded and dizziness. She had a Holter monitor in 2017, that showed sinus rhythm with occasional short bursts of SVT at 120 to 130 beats per minute. We did an event recorder, echo, and stress test in 2019 and 2019, that showed normal LV function. Her event recorder showed short bursts of SVT up to 180 beats per minute. We increased her Cardizem to Cardizem  mg daily. However, her blood pressure was somewhat low and therefore, we decreased her Cardizem to 90 mg daily which she has tolerated nicely. She has had no episodes of SVT to our knowledge. Denies any dizziness. She has had no PND, orthopnea, or pedal edema. Denies any chest pain or chest discomfort. She continues to do her own gardening. CARDIAC RISK FACTORS:  Prior MI:  Negative. Hypertension:  Positive. Hyperlipidemia:  Negative. Other Family Members:  Positive. Smoking:  Negative. Diabetes:  Negative. MEDICATIONS AT HOME:  She is currently on vitamin D 5000 units daily, Cardizem 90 mg daily. PAST MEDICAL AND SURGICAL HISTORY:  1. History of SVT, well controlled. 2.  Cataract surgery on the right side on 2015, left side on 10/01/2015. 3.  Cholecystectomy in . FAMILY HISTORY:  She had an uncle with an MI. Father  of esophageal cancer. Brother passed away of ALS.   Older brother had 3 MIs. SOCIAL HISTORY:  She is 82years old, 10 children. Daughter, Keshawn Najera, lives in [place]. Son, Delfina Sánchez, lives in [place]. Her  was an . She does not smoke or drink alcohol. She stays active. REVIEW OF SYSTEMS:  Cardiac as above. Other systems reviewed including constitutional, eyes, ears, nose and throat, cardiovascular, respiratory, GI, , musculoskeletal, integumentary, neurologic, endocrine, hematologic and allergic/immunologic are negative except for described above. No weight loss or weight gain. No change in bowel habits. No blood in stools. No fevers, sweats or chills. PHYSICAL EXAMINATION:  VITAL SIGNS:  Her blood pressure was 120/60 with a heart rate of 74 and regular. Respiratory rate 18. O2 saturation 96%. Weight 129 pounds. GENERAL:  She is a pleasant 30-year-old female. Denied pain. She was oriented to person, place and time. Answered questions appropriately. SKIN:  No unusual skin changes. HEENT:  The pupils are equally round and intact. Mucous membranes were dry. NECK:  No JVD. Good carotid pulses. She had a loud left carotid bruit. No lymphadenopathy or thyromegaly. CARDIOVASCULAR EXAM:  S1 and S2 were normal.  No S3 or S4. Soft systolic blowing type murmur. No diastolic murmur. PMI was normal.  No lift, thrust, or pericardial friction rub. LUNGS:  Quite clear to auscultation and percussion. ABDOMEN:  Soft and nontender. Good bowel sounds. EXTREMITIES:  Good femoral pulses. Good pedal pulses. No pedal edema. Skin was warm and dry. No calf tenderness. Nail beds pink. Good cap refill. PULSES:  Bilateral symmetrical radial, brachial and carotid pulses. She had a loud left carotid bruit. Good femoral and pedal pulses. NEUROLOGIC EXAM:  Within normal limits. PSYCHIATRIC EXAM:  Within normal limits. LABORATORY DATA:  From 07/09/2021, sodium 137, potassium 4.3, BUN 14, creatinine 0.71, GFR greater than 60. Magnesium 2.0, glucose 93, calcium 9.4. Cholesterol 177, HDL 56, , triglycerides 73. ALT was 9, AST was 20. TSH was 3.51. Vitamin D 82. White count 4.7, hemoglobin 13.0 with a platelet count 747,045. EKG showed sinus bradycardia at 55 beats per minute with borderline left atrial enlargement. No changes from previous EKGs. Her chest x-ray was unremarkable. IMPRESSION:  1.  SVT, well controlled. 2.  Blood pressure in the 100 to 110 range, which she is tolerating without difficulty with no symptoms of lightheadedness, dizziness, syncope or near syncope. 3.  Left carotid bruit due to a tortuous carotid artery with a normal carotid ultrasound in 2017, at [facility]. DISCUSSION:  Mrs. Amada Hernández overall is doing well. She monitors her pressure at home and generally is in the 100 to 110 range, usually at 105 to 110. She, however, tolerates it nicely with no lightheadedness, dizziness, syncope or near syncope. I made no change in her medications. Her blood pressure here was good in the 120/70 range, although she had been \"hurrying in because of it raining. \"    Mrs. Amada Hernández does have a fairly loud left carotid murmur. However, this is due to a tortuous aorta rather than atherosclerosis. A carotid ultrasound in 2017, showed no carotid artery disease. I will plan on seeing her in 1 year. Obviously, if she would have any symptoms, I would want to see her earlier. Thank you very much for allowing me the privilege of seeing Mrs. Amada Hernández. If you have any questions on my thoughts, please do not hesitate to contact me.     Sincerely,        Uche Smoker    D: 07/12/2021 8:28:56     T: 07/12/2021 8:32:54     [email or web address]  Job#: [id number]   Doc#: [id number]

## 2021-07-12 NOTE — PROGRESS NOTES
Ov DR Umu Botello 1 year follow up  No chest pain or sob  No dizziness. No hospitalizations or procedures  Since seen. Son Oralia Al  lives in [place] works at the  [place]. Daughter Joel Sneed lives in [place]  Will see in 1 year.

## 2021-07-15 NOTE — PROGRESS NOTES
Micah Chase M.D. [address] [place] [address]  [phone]    2021    Danyel Corbin, [facility]  [address] [facility]      RE:   Marcella Mota  :  1935      Dear Petra Fernandes:    CHIEF COMPLAINT:  1. SVT, well controlled. 2.  Mild hypotension which she tolerates with no lightheadedness, dizziness, syncope or near syncope. HISTORY OF PRESENT ILLNESS:  I had the pleasure of seeing Mrs. Luisa Damico in our office on 2021. She is a very pleasant 79-year-old female with a history of SVT. She also has a history of lightheaded and dizziness. She had a Holter monitor in 2017, that showed sinus rhythm with occasional short bursts of SVT at 120 to 130 beats per minute. We did an event recorder, echo, and stress test in 2019 and 2019, that showed normal LV function. Her event recorder showed short bursts of SVT up to 180 beats per minute. We increased her Cardizem to Cardizem  mg daily. However, her blood pressure was somewhat low and therefore, we decreased her Cardizem to 90 mg daily which she has tolerated nicely. She has had no episodes of SVT to our knowledge. Denies any dizziness. She has had no PND, orthopnea, or pedal edema. Denies any chest pain or chest discomfort. She continues to do her own gardening. CARDIAC RISK FACTORS:  Prior MI:  Negative. Hypertension:  Positive. Hyperlipidemia:  Negative. Other Family Members:  Positive. Smoking:  Negative. Diabetes:  Negative. MEDICATIONS AT HOME:  She is currently on vitamin D 5000 units daily, Cardizem 90 mg daily. PAST MEDICAL AND SURGICAL HISTORY:  1. History of SVT, well controlled. 2.  Cataract surgery on the right side on 2015, left side on 10/01/2015. 3.  Cholecystectomy in . FAMILY HISTORY:  She had an uncle with an MI. Father  of esophageal cancer. Brother passed away of ALS.   Older brother had 3 MIs. SOCIAL HISTORY:  She is 82years old, 10 children. Daughter, Judy Garcia, lives in [place]. Son, Evin Kidd, lives in [place]. Her  was an . She does not smoke or drink alcohol. She stays active. REVIEW OF SYSTEMS:  Cardiac as above. Other systems reviewed including constitutional, eyes, ears, nose and throat, cardiovascular, respiratory, GI, , musculoskeletal, integumentary, neurologic, endocrine, hematologic and allergic/immunologic are negative except for described above. No weight loss or weight gain. No change in bowel habits. No blood in stools. No fevers, sweats or chills. PHYSICAL EXAMINATION:  VITAL SIGNS:  Her blood pressure was 120/60 with a heart rate of 74 and regular. Respiratory rate 18. O2 saturation 96%. Weight 129 pounds. GENERAL:  She is a pleasant 81-year-old female. Denied pain. She was oriented to person, place and time. Answered questions appropriately. SKIN:  No unusual skin changes. HEENT:  The pupils are equally round and intact. Mucous membranes were dry. NECK:  No JVD. Good carotid pulses. She had a loud left carotid bruit. No lymphadenopathy or thyromegaly. CARDIOVASCULAR EXAM:  S1 and S2 were normal.  No S3 or S4. Soft systolic blowing type murmur. No diastolic murmur. PMI was normal.  No lift, thrust, or pericardial friction rub. LUNGS:  Quite clear to auscultation and percussion. ABDOMEN:  Soft and nontender. Good bowel sounds. EXTREMITIES:  Good femoral pulses. Good pedal pulses. No pedal edema. Skin was warm and dry. No calf tenderness. Nail beds pink. Good cap refill. PULSES:  Bilateral symmetrical radial, brachial and carotid pulses. She had a loud left carotid bruit. Good femoral and pedal pulses. NEUROLOGIC EXAM:  Within normal limits. PSYCHIATRIC EXAM:  Within normal limits. LABORATORY DATA:  From 07/09/2021, sodium 137, potassium 4.3, BUN 14, creatinine 0.71, GFR greater than 60. Magnesium 2.0, glucose 93, calcium 9.4. Cholesterol 177, HDL 56, , triglycerides 73. ALT was 9, AST was 20. TSH was 3.51. Vitamin D 82. White count 4.7, hemoglobin 13.0 with a platelet count 708,418. EKG showed sinus bradycardia at 55 beats per minute with borderline left atrial enlargement. No changes from previous EKGs. Her chest x-ray was unremarkable. IMPRESSION:  1.  SVT, well controlled. 2.  Blood pressure in the 100 to 110 range, which she is tolerating without difficulty with no symptoms of lightheadedness, dizziness, syncope or near syncope. 3.  Left carotid bruit due to a tortuous carotid artery with a normal carotid ultrasound in 2017, at [facility]. DISCUSSION:  Mrs. Adwoa Alas overall is doing well. She monitors her pressure at home and generally is in the 100 to 110 range, usually at 105 to 110. She, however, tolerates it nicely with no lightheadedness, dizziness, syncope or near syncope. I made no change in her medications. Her blood pressure here was good in the 120/70 range, although she had been \"hurrying in because of it raining. \"    Mrs. Adwoa Alas does have a fairly loud left carotid murmur. However, this is due to a tortuous aorta rather than atherosclerosis. A carotid ultrasound in 2017, showed no carotid artery disease. I will plan on seeing her in 1 year. Obviously, if she would have any symptoms, I would want to see her earlier. Thank you very much for allowing me the privilege of seeing Mrs. Adwoa Alas. If you have any questions on my thoughts, please do not hesitate to contact me.     Sincerely,        Pennie Markleton    D: 07/12/2021 8:28:56     T: 07/12/2021 8:32:54     [email or web address]  Job#: [id number]   Doc#: [id number]

## 2021-07-19 ENCOUNTER — CLINICAL DOCUMENTATION (OUTPATIENT)
Dept: CASE MANAGEMENT | Age: 86
End: 2021-07-19

## 2021-07-26 RX ORDER — DILTIAZEM HYDROCHLORIDE 90 MG/1
90 CAPSULE, EXTENDED RELEASE ORAL DAILY
Qty: 90 CAPSULE | Refills: 3 | Status: SHIPPED | OUTPATIENT
Start: 2021-07-26 | End: 2022-04-05 | Stop reason: SDUPTHER

## 2021-07-26 NOTE — TELEPHONE ENCOUNTER
Patient needs a new script for Diltiazem - patient uses [address] Maintenance   Topic Date Due    COVID-19 Vaccine (1) Never done    DTaP/Tdap/Td vaccine (1 - Tdap) Never done    Shingles Vaccine (1 of 2) Never done    Flu vaccine (1) 09/01/2021    Annual Wellness Visit (AWV)  04/20/2022    DEXA (modify frequency per FRAX score)  Completed    Pneumococcal 65+ years Vaccine  Completed    Hepatitis A vaccine  Aged Out    Hepatitis B vaccine  Aged Out    Hib vaccine  Aged Out    Meningococcal (ACWY) vaccine  Aged Out             (applicable per patient's age: Cancer Screenings, Depression Screening, Fall Risk Screening, Immunizations)    LDL Cholesterol (mg/dL)   Date Value   07/09/2021 106     AST (U/L)   Date Value   07/09/2021 20     ALT (U/L)   Date Value   07/09/2021 9     BUN (mg/dL)   Date Value   07/09/2021 14      (goal A1C is < 7)   (goal LDL is <100) need 30-50% reduction from baseline     BP Readings from Last 3 Encounters:   07/12/21 120/60   04/19/21 122/70   07/13/20 (!) 140/70    (goal /80)      All Future Testing planned in [facility]:  Lab Frequency Next Occurrence   TSH with Reflex Once 06/12/2022   CBC Auto Differential Once 06/12/2022   Comprehensive Metabolic Panel Once 44/79/6444   Vitamin D 25 Hydroxy Once 06/12/2022   Lipid Panel Once 06/12/2022   Magnesium Once 06/12/2022   EKG 12 Lead Once 06/12/2022   XR CHEST (2 VW) Once 06/12/2022       Next Visit Date:  Future Appointments   Date Time Provider Shana Nava   10/25/2021  1:00 PM [facility]NA [facility]   7/7/2022 10:00 AM MD Carmelina Pal [facility]            Patient Active Problem List:     Left carotid bruit     Corn of foot     Family history of heart disease     Chronic cough     Acquired deviated nasal septum     Paroxysmal SVT (supraventricular tachycardia) ([address]

## 2021-10-25 ENCOUNTER — OFFICE VISIT (OUTPATIENT)
Dept: FAMILY MEDICINE CLINIC | Age: 86
End: 2021-10-25
Payer: MEDICARE

## 2021-10-25 VITALS
SYSTOLIC BLOOD PRESSURE: 136 MMHG | HEART RATE: 86 BPM | WEIGHT: 131 LBS | HEIGHT: 64 IN | DIASTOLIC BLOOD PRESSURE: 80 MMHG | OXYGEN SATURATION: 98 % | TEMPERATURE: 98.1 F | BODY MASS INDEX: 22.36 KG/M2

## 2021-10-25 DIAGNOSIS — Z12.31 SCREENING MAMMOGRAM FOR BREAST CANCER: ICD-10-CM

## 2021-10-25 DIAGNOSIS — L40.9 SCALP PSORIASIS: ICD-10-CM

## 2021-10-25 DIAGNOSIS — Z78.0 POSTMENOPAUSAL ESTROGEN DEFICIENCY: ICD-10-CM

## 2021-10-25 DIAGNOSIS — K59.09 OTHER CONSTIPATION: ICD-10-CM

## 2021-10-25 DIAGNOSIS — Z80.3 FAMILY HISTORY OF BREAST CANCER IN FEMALE: ICD-10-CM

## 2021-10-25 DIAGNOSIS — Z13.820 SCREENING FOR OSTEOPOROSIS: ICD-10-CM

## 2021-10-25 DIAGNOSIS — M85.851 OSTEOPENIA OF BOTH HIPS: ICD-10-CM

## 2021-10-25 DIAGNOSIS — I47.1 PAROXYSMAL SVT (SUPRAVENTRICULAR TACHYCARDIA) (HCC): Primary | ICD-10-CM

## 2021-10-25 DIAGNOSIS — M85.852 OSTEOPENIA OF BOTH HIPS: ICD-10-CM

## 2021-10-25 DIAGNOSIS — R09.89 LEFT CAROTID BRUIT: ICD-10-CM

## 2021-10-25 PROCEDURE — G8399 PT W/DXA RESULTS DOCUMENT: HCPCS | Performed by: NURSE PRACTITIONER

## 2021-10-25 PROCEDURE — 1090F PRES/ABSN URINE INCON ASSESS: CPT | Performed by: NURSE PRACTITIONER

## 2021-10-25 PROCEDURE — G8427 DOCREV CUR MEDS BY ELIG CLIN: HCPCS | Performed by: NURSE PRACTITIONER

## 2021-10-25 PROCEDURE — 4040F PNEUMOC VAC/ADMIN/RCVD: CPT | Performed by: NURSE PRACTITIONER

## 2021-10-25 PROCEDURE — 1036F TOBACCO NON-USER: CPT | Performed by: NURSE PRACTITIONER

## 2021-10-25 PROCEDURE — 99214 OFFICE O/P EST MOD 30 MIN: CPT | Performed by: NURSE PRACTITIONER

## 2021-10-25 PROCEDURE — G8420 CALC BMI NORM PARAMETERS: HCPCS | Performed by: NURSE PRACTITIONER

## 2021-10-25 PROCEDURE — G8484 FLU IMMUNIZE NO ADMIN: HCPCS | Performed by: NURSE PRACTITIONER

## 2021-10-25 PROCEDURE — 1123F ACP DISCUSS/DSCN MKR DOCD: CPT | Performed by: NURSE PRACTITIONER

## 2021-10-25 NOTE — PROGRESS NOTES
[facility]  [facility]  [address] [facility]  [place] [address]  Dept: [phone]     Subjective:  Pietro Ybarra is a 82 y.o. female presenting today for routine follow up of hypertension, hyperlipidemia and impaired fasting glucose and recent labs. She denies chest pain, shortness of breath or palpitations. She denies headaches, vision changes and lightheadedness. She denies myalgias. She denies numbness and tingling in the hands and feet. She has been compliant with diet and exercise. She has been compliant with her medications. She is tolerating medications. Constipation:     · Hypertension: well controlled with current medications   · Medications: continue current medication doses     · Osteopenia on calcium and vitamin D3 being taken due for dexa repeat. Pt walks to bus stop daily. · Psoriasis posterior scalp, Left ear pinna dry and flaking, itches. · Breast cancer screening,pt does SBE declines clinical breast exam today, daughter with hx breast cancer , has 10 children. · Pt agreeable to mammogram.   ·   SVT up to 180 bpm  controlled with cardizem     Left carotid bruit with normal carotid ultrasound in 2017. · Nutrition Status:  BMI 22.49   · Recommend continue current healthy lifestyle with diet and regular exercise         CURRENT ALLERGIES: Patient has no known allergies. SOCIAL HISTORY:   Social History     Tobacco Use    Smoking status: Passive Smoke Exposure - Never Smoker    Smokeless tobacco: Never Used   Vaping Use    Vaping Use: Never used   Substance Use Topics    Alcohol use: Never     Alcohol/week: 0.0 standard drinks    Drug use: No       HPI       Medication List          Accurate as of October 25, 2021 11:59 PM. If you have any questions, ask your nurse or doctor.             CONTINUE taking these medications    aspirin-acetaminophen-caffeine 250-250-65 MG per tablet  Commonly known as: EXCEDRIN MIGRAINE     calcium carbonate 600 MG Tabs tablet     dilTIAZem 90 MG extended release capsule  Commonly known as: CARDIZEM 12 HR  Take 1 capsule by mouth daily     glucosamine-chondroitin 500-400 MG Caps     therapeutic multivitamin-minerals tablet     UNABLE TO FIND     Vitamin D3 125 MCG (5000 UT) Tabs        STOP taking these medications    BOOST PO  Stopped by: ANNA MARIE Guillaume CNP     Glucerna Liqd  Stopped by: ANNA MARIE Guillaume CNP               Review of Systems   Constitutional: Negative for activity change, appetite change, chills and fever. HENT: Negative for congestion, hearing loss, rhinorrhea and sore throat. Eyes: Negative. Respiratory: Negative for cough, chest tightness and shortness of breath. Cardiovascular: Negative for chest pain and leg swelling. Gastrointestinal: Negative for abdominal distention. Endocrine: Negative for cold intolerance and heat intolerance. Genitourinary: Negative for difficulty urinating and frequency. Musculoskeletal: Negative for arthralgias and gait problem. Skin: Positive for rash (psoriasis on posterior scalp ). Neurological: Negative for dizziness and headaches. Psychiatric/Behavioral: Negative for sleep disturbance. The patient is not nervous/anxious. Objective:  /80 (Site: Right Upper Arm)   Pulse 86   Temp 98.1 °F (36.7 °C)   Ht 5' 4\" (1.626 m)   Wt 131 lb (59.4 kg)   SpO2 98%   BMI 22.49 kg/m²   Physical Exam  Vitals and nursing note reviewed. Constitutional:       General: She is not in acute distress. Appearance: Normal appearance. She is well-developed. She is not ill-appearing. HENT:      Head: Normocephalic and atraumatic. Right Ear: Tympanic membrane and external ear normal.      Left Ear: Tympanic membrane and external ear normal.      Nose: No congestion. Mouth/Throat:      Mouth: Mucous membranes are moist.   Eyes:      General: No scleral icterus. Pupils: Pupils are equal, round, and reactive to light.    Neck: Vascular: Carotid bruit (left carotid bruit ) present. Cardiovascular:      Rate and Rhythm: Normal rate and regular rhythm. Heart sounds: Normal heart sounds. No murmur heard. Pulmonary:      Effort: Pulmonary effort is normal. No respiratory distress. Breath sounds: Normal breath sounds. No wheezing. Abdominal:      General: Bowel sounds are normal.      Palpations: Abdomen is soft. Tenderness: There is no abdominal tenderness. Musculoskeletal:         General: Normal range of motion. Cervical back: Normal range of motion and neck supple. Right lower leg: No edema. Left lower leg: No edema. Lymphadenopathy:      Cervical: No cervical adenopathy. Skin:     General: Skin is warm and dry. Findings: No rash. Neurological:      Mental Status: She is alert and oriented to person, place, and time. Psychiatric:         Behavior: Behavior normal.         Thought Content: Thought content normal.         Judgment: Judgment normal.           Diagnostic Data:  Lab Results   Component Value Date    GLUCOSE 93 07/09/2021     Lab Results   Component Value Date    BUN 14 07/09/2021    CREATININE 0.71 07/09/2021     07/09/2021    K 4.3 07/09/2021    CALCIUM 9.4 07/09/2021     07/09/2021    CO2 28 07/09/2021    LABGLOM >60 07/09/2021     Lab Results   Component Value Date    CHOL 177 07/09/2021    HDL 56 07/09/2021    LDLCHOLESTEROL 106 07/09/2021    TRIG 73 07/09/2021    ALT 9 07/09/2021    AST 20 07/09/2021     Lab Results   Component Value Date    TSH 3.51 07/09/2021       Start metamucil fiber supplement 1 teaspoon daily in 8 ounces of water. Also for the \"PUSH\" power to get stool out buy (dulcolax pill) which is sold over the counter take 1 today then take 1 pill ONCE a week to give more stool the push out power. Can also  [address] which is also a stimulant (push) laxative . Keep it in the house . Water/fluid intake want 64 ounces a day. Tetanus/diptheria and pertussis vaccine needs updated at local pharmacy.  magnesium supplement at local pharmacy 250 mg dose once a day which will help your bowels move better. 1. Paroxysmal SVT (supraventricular tachycardia) (HCC)  Continue cardizem  stable    2. Left carotid bruit  Stable tortuous, normal carotid ultrasound 2017.     3. Other constipation  Start metamucil, stool softner    4. Osteopenia of both hips    - DEXA BONE DENSITY 2 SITES; Future    5. Screening for osteoporosis    - DEXA BONE DENSITY 2 SITES; Future    6. Postmenopausal estrogen deficiency    - DEXA BONE DENSITY 2 SITES; Future    7. Screening mammogram for breast cancer    - RUDDY FROY DIGITAL SCREEN BILATERAL; Future    8. Family history of breast cancer in female    - RUDDY FROY DIGITAL SCREEN BILATERAL;  Future  ·   · Health Maintenance:  · Discussed health maintenance issues: breast cancer screening and patient is up to date for health maintenance items  · Discussed immunization schedule: recommend annual flu vaccine and patient is up to date for vaccinations    Electronically signed by ANNA MARIE Patton CNP on 10/26/2021 at 8:01 PM

## 2021-10-25 NOTE — Clinical Note
Inform patient I sent in steroid cream to try on scalp for psoriasis and small amount to ears can be used daily prn would use consistency for 1-2 weeks then just as needed.

## 2021-10-25 NOTE — PATIENT INSTRUCTIONS
You may be receiving a survey from [employer] regarding your visit today. You may get this in the mail, through your MyChart or in your email. Please complete the survey to enable us to provide the highest quality of care to you and your family. If you cannot score us as very good ( 5 Stars) on any question, please feel free to call the office to discuss how we could have made your experience exceptional.     Thank You! Sabine Tapia, APRN-CNP  [address]  [place], [place]    Phone: [phone]  Fax: [address] [facility] Office Hours:  Monday: [facility] office location [phone] ([phone]) Offering additional late hours the first Monday of the month until 7 pm.   Tuesday: 8-5 Wednesday: 8-5 Thursday:  Additional hours offered 2 Thursdays a month. Please call to inquire those dates. Fridays: 7:30-4:30        Start metamucil fiber supplement 1 teaspoon daily in 8 ounces of water. Also for the \"PUSH\" power to get stool out buy (dulcolax pill) which is sold over the counter take 1 today then take 1 pill ONCE a week to give more stool the push out power. Can also  [address] which is also a stimulant (push) laxative . Keep it in the house . Water/fluid intake want 64 ounces a day. Tetanus/diptheria and pertussis vaccine needs updated at local pharmacy.  magnesium supplement at local pharmacy 250 mg dose once a day which will help your bowels move better.

## 2021-10-26 ASSESSMENT — ENCOUNTER SYMPTOMS
SHORTNESS OF BREATH: 0
CHEST TIGHTNESS: 0
COUGH: 0
ABDOMINAL DISTENTION: 0
RHINORRHEA: 0
SORE THROAT: 0
EYES NEGATIVE: 1

## 2021-11-15 ENCOUNTER — TELEPHONE (OUTPATIENT)
Dept: FAMILY MEDICINE CLINIC | Age: 86
End: 2021-11-15

## 2021-11-15 NOTE — TELEPHONE ENCOUNTER
Kristy Rivers took a COVID test yesterday and it was negative. She is c/o having cough and is tired. She said last week she was raking leaves and she thinks that is what did it for her. She would like something called in for her. Please let patient know.     [facility]    Health Maintenance   Topic Date Due    COVID-19 Vaccine (1) Never done    DTaP/Tdap/Td vaccine (1 - Tdap) Never done    Shingles Vaccine (1 of 2) Never done    Flu vaccine (1) Never done   [employer] Visit (AWV)  04/20/2022    DEXA (modify frequency per FRAX score)  Completed    Pneumococcal 65+ years Vaccine  Completed    Hepatitis A vaccine  Aged Out    Hepatitis B vaccine  Aged Out    Hib vaccine  Aged Out    Meningococcal (ACWY) vaccine  Aged Out             (applicable per patient's age: Cancer Screenings, Depression Screening, Fall Risk Screening, Immunizations)    LDL Cholesterol (mg/dL)   Date Value   07/09/2021 106     AST (U/L)   Date Value   07/09/2021 20     ALT (U/L)   Date Value   07/09/2021 9     BUN (mg/dL)   Date Value   07/09/2021 14      (goal A1C is < 7)   (goal LDL is <100) need 30-50% reduction from baseline     BP Readings from Last 3 Encounters:   10/25/21 136/80   07/12/21 120/60   04/19/21 122/70    (goal /80)      All Future Testing planned in [facility]:  Lab Frequency Next Occurrence   TSH with Reflex Once 06/12/2022   CBC Auto Differential Once 06/12/2022   Comprehensive Metabolic Panel Once 07/59/8975   Vitamin D 25 Hydroxy Once 06/12/2022   Lipid Panel Once 06/12/2022   Magnesium Once 06/12/2022   EKG 12 Lead Once 06/12/2022   XR CHEST (2 VW) Once 06/12/2022   DEXA BONE DENSITY 2 SITES Once 02/10/2022   [facility] DIGITAL SCREEN BILATERAL Once 10/25/2021       Next Visit Date:  Future Appointments   Date Time Provider Shana Nava   11/24/2021  8:30 AM [facility] DEXA ROOM AT [address] DIVISION Rad   11/24/2021  9:30 AM [address] [facility] DIVISION Rad   5/2/2022  5:00 PM Anmol Lr, APRN - CNP [facility]   7/7/2022 10:00 AM MD [place] [facility]            Patient Active Problem List:     Left carotid bruit     Corn of foot     Family history of heart disease     Chronic cough     Acquired deviated nasal septum     Paroxysmal SVT (supraventricular tachycardia) ([facility] [address]

## 2021-11-16 NOTE — TELEPHONE ENCOUNTER
Onset Tuesday     Cough, fatigue , denies HA , SOB,Fever, wheezing sore throat    States she tested Sunday  And was negative    Pt states she is feeling better and doesn't need a medication

## 2021-11-16 NOTE — TELEPHONE ENCOUNTER
Need more details please. How long has pt had cough? Any fever? Wheezing? Congestion,nasal drainage  Sore throat. Is she vaccinated for covid? 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      What pharmacy:

## 2021-11-18 ENCOUNTER — APPOINTMENT (OUTPATIENT)
Dept: GENERAL RADIOLOGY | Age: 86
End: 2021-11-18
Payer: MEDICARE

## 2021-11-18 ENCOUNTER — HOSPITAL ENCOUNTER (EMERGENCY)
Age: 86
Discharge: HOME | End: 2021-11-18
Attending: FAMILY MEDICINE
Payer: MEDICARE

## 2021-11-18 VITALS
BODY MASS INDEX: 22.23 KG/M2 | TEMPERATURE: 97.4 F | DIASTOLIC BLOOD PRESSURE: 53 MMHG | WEIGHT: 133.4 LBS | HEIGHT: 65 IN | HEART RATE: 78 BPM | SYSTOLIC BLOOD PRESSURE: 96 MMHG | OXYGEN SATURATION: 95 % | RESPIRATION RATE: 26 BRPM

## 2021-11-18 DIAGNOSIS — I48.91 ATRIAL FIBRILLATION, UNSPECIFIED TYPE (HCC): Primary | ICD-10-CM

## 2021-11-18 LAB
ABSOLUTE EOS #: 0.2 K/UL (ref 0–0.4)
ABSOLUTE IMMATURE GRANULOCYTE: ABNORMAL K/UL (ref 0–0.3)
ABSOLUTE LYMPH #: 1 K/UL (ref 1–4.8)
ABSOLUTE MONO #: 0.8 K/UL (ref 0–1)
ANION GAP SERPL CALCULATED.3IONS-SCNC: 12 MMOL/L (ref 9–17)
BASOPHILS # BLD: 0 % (ref 0–2)
BASOPHILS ABSOLUTE: 0 K/UL (ref 0–0.2)
BNP INTERPRETATION: ABNORMAL
BUN BLDV-MCNC: 15 MG/DL (ref 8–23)
BUN/CREAT BLD: 19 (ref 9–20)
CALCIUM SERPL-MCNC: 10 MG/DL (ref 8.6–10.4)
CHLORIDE BLD-SCNC: 102 MMOL/L (ref 98–107)
CO2: 22 MMOL/L (ref 20–31)
CREAT SERPL-MCNC: 0.79 MG/DL (ref 0.5–0.9)
DIFFERENTIAL TYPE: YES
EOSINOPHILS RELATIVE PERCENT: 3 % (ref 0–5)
GFR AFRICAN AMERICAN: >60 ML/MIN
GFR NON-AFRICAN AMERICAN: >60 ML/MIN
GFR SERPL CREATININE-BSD FRML MDRD: ABNORMAL ML/MIN/{1.73_M2}
GFR SERPL CREATININE-BSD FRML MDRD: ABNORMAL ML/MIN/{1.73_M2}
GLUCOSE BLD-MCNC: 143 MG/DL (ref 70–99)
HCT VFR BLD CALC: 37.2 % (ref 36–46)
HEMOGLOBIN: 12.5 G/DL (ref 12–16)
IMMATURE GRANULOCYTES: ABNORMAL %
LYMPHOCYTES # BLD: 15 % (ref 15–40)
MAGNESIUM: 2 MG/DL (ref 1.6–2.6)
MCH RBC QN AUTO: 31.3 PG (ref 26–34)
MCHC RBC AUTO-ENTMCNC: 33.5 G/DL (ref 31–37)
MCV RBC AUTO: 93.3 FL (ref 80–100)
MONOCYTES # BLD: 11 % (ref 4–8)
NRBC AUTOMATED: ABNORMAL PER 100 WBC
PDW BLD-RTO: 13.1 % (ref 12.1–15.2)
PLATELET # BLD: 262 K/UL (ref 140–450)
PLATELET ESTIMATE: ABNORMAL
PMV BLD AUTO: ABNORMAL FL (ref 6–12)
POTASSIUM SERPL-SCNC: 3.5 MMOL/L (ref 3.7–5.3)
PRO-BNP: 1760 PG/ML
RBC # BLD: 3.98 M/UL (ref 4–5.2)
RBC # BLD: ABNORMAL 10*6/UL
SEG NEUTROPHILS: 71 % (ref 47–75)
SEGMENTED NEUTROPHILS ABSOLUTE COUNT: 5 K/UL (ref 2.5–7)
SODIUM BLD-SCNC: 136 MMOL/L (ref 135–144)
TROPONIN INTERP: ABNORMAL
TROPONIN INTERP: ABNORMAL
TROPONIN T: ABNORMAL NG/ML
TROPONIN T: ABNORMAL NG/ML
TROPONIN, HIGH SENSITIVITY: 109 NG/L (ref 0–14)
TROPONIN, HIGH SENSITIVITY: 124 NG/L (ref 0–14)
WBC # BLD: 7.1 K/UL (ref 3.5–11)
WBC # BLD: ABNORMAL 10*3/UL

## 2021-11-18 PROCEDURE — 71045 X-RAY EXAM CHEST 1 VIEW: CPT

## 2021-11-18 PROCEDURE — 36415 COLL VENOUS BLD VENIPUNCTURE: CPT

## 2021-11-18 PROCEDURE — 85025 COMPLETE CBC W/AUTO DIFF WBC: CPT

## 2021-11-18 PROCEDURE — 84484 ASSAY OF TROPONIN QUANT: CPT

## 2021-11-18 PROCEDURE — 83735 ASSAY OF MAGNESIUM: CPT

## 2021-11-18 PROCEDURE — 2580000003 HC RX 258: Performed by: FAMILY MEDICINE

## 2021-11-18 PROCEDURE — 93005 ELECTROCARDIOGRAM TRACING: CPT | Performed by: FAMILY MEDICINE

## 2021-11-18 PROCEDURE — 80048 BASIC METABOLIC PNL TOTAL CA: CPT

## 2021-11-18 PROCEDURE — 99284 EMERGENCY DEPT VISIT MOD MDM: CPT

## 2021-11-18 PROCEDURE — 83880 ASSAY OF NATRIURETIC PEPTIDE: CPT

## 2021-11-18 RX ORDER — DILTIAZEM HYDROCHLORIDE 5 MG/ML
10 INJECTION INTRAVENOUS ONCE
Status: DISCONTINUED | OUTPATIENT
Start: 2021-11-18 | End: 2021-11-18 | Stop reason: HOSPADM

## 2021-11-18 RX ORDER — 0.9 % SODIUM CHLORIDE 0.9 %
500 INTRAVENOUS SOLUTION INTRAVENOUS ONCE
Status: COMPLETED | OUTPATIENT
Start: 2021-11-18 | End: 2021-11-18

## 2021-11-18 RX ADMIN — SODIUM CHLORIDE 500 ML: 9 INJECTION, SOLUTION INTRAVENOUS at 13:57

## 2021-11-18 ASSESSMENT — ENCOUNTER SYMPTOMS
CHEST TIGHTNESS: 0
SHORTNESS OF BREATH: 1
NAUSEA: 0

## 2021-11-18 NOTE — ED NOTES
Dr. Cherri Mahan at bedside and aware of BP of 94/72. MD orders to hold diltiazem until patient receives fluid to stabilized pressure prior to administration.       Michaele Cranker, RN  11/18/21 1573

## 2021-11-18 NOTE — ED TRIAGE NOTES
Pt has had shortness of breath for last week. Last 2 days has had dizziness, denies any pain. Medication list completed per recall.

## 2021-11-18 NOTE — ED PROVIDER NOTES
[address] [facility]  eMERGENCY dEPARTMENT eNCOUnter          [address] [facility]       Chief Complaint   Patient presents with    Dizziness    Shortness of Breath       Nurses Notes reviewed and I agree except as noted in the HPI. HISTORY OF PRESENT ILLNESS    Michael Sam is a 82 y.o. female who presents emergency room via private vehicle, patient complaining of shortness of breath for the past few days, dizziness that started more today, notes that when she lays flat she is been dizzy for years. Patient denies any chest pain or palpitations, though she does state at times she has felt palpitations her neck so this is been more chronic for her. Denies shortness of breath denies trauma falls denies fever chills denies nausea vomiting. Patient states compliant with medications. Patient has known history of PSVT and chronic cough, does think she may been coughing slightly more than normal for the past week. No known sick contacts. PCP: VERNA Santoro  Cards: Dr. Christiano Landeros     Review of Systems   Respiratory: Positive for shortness of breath. Negative for chest tightness. Cardiovascular: Negative for chest pain, palpitations and leg swelling. Gastrointestinal: Negative for nausea. Genitourinary: Negative for dysuria. Neurological: Positive for dizziness. PAST MEDICAL HISTORY    has a past medical history of Paroxysmal SVT (supraventricular tachycardia) ([facility] [address] SURGICAL HISTORY      has a past surgical history that includes Cholecystectomy (1990); eye surgery (Right, 8/24/15); eye surgery (Left, 10/01/2015); and other surgical history (03/2017).     CURRENT MEDICATIONS       Discharge Medication List as of 11/18/2021  3:50 PM      CONTINUE these medications which have NOT CHANGED    Details   dilTIAZem (CARDIZEM 12 HR) 90 MG extended release capsule Take 1 capsule by mouth daily, Disp-90 capsule, R-3Will be only taking qdNormal      glucosamine-chondroitin 500-400 MG CAPS Take 1 capsule by mouth dailyHistorical Med      aspirin-acetaminophen-caffeine (EXCEDRIN MIGRAINE) 250-250-65 MG per tablet Take 1 tablet by mouth every 6 hours as needed for HeadachesHistorical Med      Cholecalciferol (VITAMIN D3) 125 MCG (5000 UT) TABS Take 5,000 Units by mouth daily Indications: [employer] organic cocnut oilHistorical Med      calcium carbonate 600 MG TABS tablet Take 1 tablet by mouth dailyHistorical Med      Multiple Vitamins-Minerals (THERAPEUTIC MULTIVITAMIN-MINERALS) tablet Take 1 tablet by mouth daily             ALLERGIES     has No Known Allergies. FAMILY HISTORY     She indicated that her father is . She indicated that her brother is . family history includes Cancer (age of onset: 71) in her father; Heart Disease in her brother; Other (age of onset: 71) in her brother. SOCIAL HISTORY      reports that she is a non-smoker but has been exposed to tobacco smoke. She has never used smokeless tobacco. She reports that she does not drink alcohol and does not use drugs. PHYSICAL EXAM     INITIAL VITALS:  height is 5' 5\" (1.651 m) and weight is 133 lb 6.4 oz (60.5 kg). Her oral temperature is 97.4 °F (36.3 °C). Her blood pressure is 96/53 (abnormal) and her pulse is 78. Her respiration is 26 and oxygen saturation is 95%. Physical Exam   Constitutional: Patient is oriented to person, place, and time. Patient appears well-developed and well-nourished. Patient is active and cooperative. HENT:   Head: Normocephalic and atraumatic. Head is without contusion. Right Ear: Hearing and external ear normal. No drainage. Left Ear: Hearing and external ear normal. No drainage. Nose: Nose normal. No nasal deformity. No epistaxis. Mouth/Throat: Mucous membranes are not dry. Eyes: EOMI. Conjunctivae, sclera, and lids are normal. Right eye exhibits no discharge. Left eye exhibits no discharge.    Neck: Full passive range of motion without pain and All other components within normal limits   BRAIN NATRIURETIC PEPTIDE - Abnormal; Notable for the following components:    Pro-BNP 1,760 (*)     All other components within normal limits   TROPONIN - Abnormal; Notable for the following components:    Troponin, High Sensitivity 124 (*)     All other components within normal limits   TROPONIN - Abnormal; Notable for the following components:    Troponin, High Sensitivity 109 (*)     All other components within normal limits   MAGNESIUM   URINALYSIS       EMERGENCY DEPARTMENT COURSE:   Vitals:    Vitals:    11/18/21 1400 11/18/21 1415 11/18/21 1447 11/18/21 1502   BP: 94/70 94/69 (!) 102/54 (!) 96/53   Pulse: 117 129 92 78   Resp: 25 (!) 38 25 26   Temp:       TempSrc:       SpO2: 94% 95% 95% 95%   Weight:       Height:         Patient history and physical exam taken at bedside, discussed patient symptoms and exam findings, discussed my concerns for atrial fibrillation on the monitor, will get EKG, chest x-ray, blood work, if patient able to urinate will obtain urine sample, and will reevaluate. Patient placed on cardiac monitor, continuous pulse oximetry. EKG as above, noting patient AF RVR, patient has no history of prior    Diltiazem 10 mg IV ordered, however note patient's blood pressure is currently systolic 94, will give 366 cc normal saline first prior to diltiazem. Discussed with patient and patient's son who arrived afterwards, EKG findings, explaining atrial fibrillation including drawing on a piece of paper to explain the electrical pathways, discussed giving some fluids before normal saline, patient knowledges. Initial labs reviewed, normal white blood cell count differential, normal H&H, normal kidney function, normal electrolytes, pBNP 1760, hsTnT 124    Patient observed to converted to sinus rhythm without receiving the diltiazem, will continue to monitor, 1 hour troponin ordered.     hsTnT 109    Was discussed with Dr. Mariana Reynolds, cardiology, regarding patient's presentation and current work-up, there is concerned that patient was in atrial fibrillation is seen on EKG, advising starting patient on Eliquis 2.5 mg twice daily and have patient stop by office on discharge to  samples, no other changes in medications patient is already on Cardizem and her pressures having been systolic , outpatient follow-up    Discussed with patient overall work-up including conversation with her cardiologist, at this time we will discharge patient home, I do advise her to stay well-hydrated, advise no medication changes to her current meds however we can be adding Eliquis 2.5 mg twice daily, patient can stop by her cardiology office on discharge to  both samples as well as set up a follow-up appointment, patient can return emergency room if any symptoms change worse other concerns, acknowledged    FINAL IMPRESSION      1.  Atrial fibrillation, unspecified type [facility])          DISPOSITION/PLAN   Discharge    PATIENT REFERRED TO:  Compa Chaparro MD  [address] [address]  [phone]    Today  Please stop by his office on discharge to pickup sample medications and set up follow up appointment    ANNA MARIE Tripp  [facility]  [phone]    Call   As needed    HOSP GENERAL [facility] ED  [address] [facility]     As needed, If symptoms worsen      DISCHARGE MEDICATIONS:  Discharge Medication List as of 11/18/2021  3:50 PM      START taking these medications    Details   apixaban (ELIQUIS) 2.5 MG TABS tablet Take 1 tablet by mouth 2 times daily, Disp-60 tablet, R-1Print                 Summation      Patient Course: Discharge    ED Medications administered this visit:    Medications   0.9 % sodium chloride bolus (0 mLs IntraVENous Stopped 11/18/21 5387)       New Prescriptions from this visit:    Discharge Medication List as of 11/18/2021  3:50 PM      START taking these medications    Details   apixaban (ELIQUIS) 2.5 MG TABS tablet Take 1 tablet by mouth 2 times daily, Disp-60 tablet, R-1Print             Follow-up:  Shad Jones MD  [address]  [phone]    Today  Please stop by his office on discharge to pickup sample medications and set up follow up appointment    ANNA MARIE Rhodes - CNP  [facility] [address] [facility]  [phone]    Call   As needed    [facility] ED  [address] [facility] [address] [phone]    As needed, If symptoms worsen        Final Impression:   1.  [facility]               (Please note that portions of this note were completed with a voice recognition program.  Efforts were made to edit the dictations but occasionally words are mis-transcribed.)    MD Janie Bell MD  11/19/21 0738

## 2021-11-18 NOTE — ED NOTES
Patient converts to NSR. Dr. Rubén Saenz notified. Repeat EKG in progress.       José Luis Ruiz RN  11/18/21 6540

## 2021-11-19 LAB
EKG ATRIAL RATE: 166 BPM
EKG ATRIAL RATE: 79 BPM
EKG P AXIS: 40 DEGREES
EKG P-R INTERVAL: 148 MS
EKG Q-T INTERVAL: 306 MS
EKG Q-T INTERVAL: 372 MS
EKG QRS DURATION: 88 MS
EKG QRS DURATION: 88 MS
EKG QTC CALCULATION (BAZETT): 426 MS
EKG QTC CALCULATION (BAZETT): 453 MS
EKG R AXIS: 39 DEGREES
EKG R AXIS: 50 DEGREES
EKG T AXIS: 48 DEGREES
EKG T AXIS: 59 DEGREES
EKG VENTRICULAR RATE: 132 BPM
EKG VENTRICULAR RATE: 79 BPM

## 2021-11-19 PROCEDURE — 93010 ELECTROCARDIOGRAM REPORT: CPT | Performed by: INTERNAL MEDICINE

## 2021-11-24 ENCOUNTER — HOSPITAL ENCOUNTER (OUTPATIENT)
Dept: MAMMOGRAPHY | Age: 86
Discharge: HOME | End: 2021-11-26
Payer: MEDICARE

## 2021-11-24 ENCOUNTER — HOSPITAL ENCOUNTER (OUTPATIENT)
Dept: BONE DENSITY | Age: 86
Discharge: HOME | End: 2021-11-26
Payer: MEDICARE

## 2021-11-24 DIAGNOSIS — Z78.0 POSTMENOPAUSAL ESTROGEN DEFICIENCY: ICD-10-CM

## 2021-11-24 DIAGNOSIS — Z80.3 FAMILY HISTORY OF BREAST CANCER IN FEMALE: ICD-10-CM

## 2021-11-24 DIAGNOSIS — Z12.31 SCREENING MAMMOGRAM FOR BREAST CANCER: ICD-10-CM

## 2021-11-24 DIAGNOSIS — M85.852 OSTEOPENIA OF BOTH HIPS: ICD-10-CM

## 2021-11-24 DIAGNOSIS — M85.851 OSTEOPENIA OF BOTH HIPS: ICD-10-CM

## 2021-11-24 DIAGNOSIS — Z13.820 SCREENING FOR OSTEOPOROSIS: ICD-10-CM

## 2021-11-24 PROCEDURE — 77063 BREAST TOMOSYNTHESIS BI: CPT

## 2021-11-24 PROCEDURE — 77080 DXA BONE DENSITY AXIAL: CPT

## 2021-11-30 RX ORDER — MIDODRINE HYDROCHLORIDE 5 MG/1
5 TABLET ORAL 2 TIMES DAILY
Qty: 60 TABLET | Refills: 2 | Status: SHIPPED | OUTPATIENT
Start: 2021-11-30 | End: 2022-01-27

## 2021-12-02 ENCOUNTER — TELEPHONE (OUTPATIENT)
Dept: FAMILY MEDICINE CLINIC | Age: 86
End: 2021-12-02

## 2021-12-02 NOTE — TELEPHONE ENCOUNTER
----- Message from Mani Quinonez sent at 12/2/2021 12:03 PM EST -----  Subject: Medication Problem    QUESTIONS  Name of Medication? midodrine (PROAMATINE) 5 MG tablet  Patient-reported dosage and instructions? 5 mg  What question or problem do you have with the medication? would like to   know exactly how to take the medication. Preferred Pharmacy? [address] [facility] [phone]  Pharmacy phone number (if available)? [phone]  Additional Information for Provider?   ---------------------------------------------------------------------------  --------------  [address]  What is the best way for the office to contact you? OK to leave message on   voicemail  Preferred Call Back Phone Number? [phone]  ---------------------------------------------------------------------------  --------------  SCRIPT ANSWERS  Relationship to Patient?  Self

## 2021-12-02 NOTE — TELEPHONE ENCOUNTER
I called patient and she states she is having a hard time finding the exact time of day to take new med because the pharmacy told hr she has to be up walking for an hour prior to taking med and take it one hour BEFORE eating, 3-4 hours before bed but 12 hours between doses. Veto Fermin

## 2021-12-03 NOTE — TELEPHONE ENCOUNTER
I spoke with pt on phone 7:40 PM I instructed her to take medication within the hour after waking and 2nd dose should be 4-6 hours later. Do not take prior to laying down or after 6 pm     Preferred time 8 am and 1-2 PM best.   Pt taking 1 pill twice a day.      Pt verbalized understanding    Roibna Portillollor ANNA MARIE FABIAN

## 2022-01-27 RX ORDER — MIDODRINE HYDROCHLORIDE 5 MG/1
5 TABLET ORAL 2 TIMES DAILY
Qty: 60 TABLET | Refills: 2 | Status: SHIPPED | OUTPATIENT
Start: 2022-01-27 | End: 2022-04-05 | Stop reason: SDUPTHER

## 2022-01-27 NOTE — TELEPHONE ENCOUNTER
Last OV: 10/25/2021 svt  Last RX:   Next scheduled apt: 5/2/2022        Sure scripts request    RX pending

## 2022-01-31 ENCOUNTER — TELEPHONE (OUTPATIENT)
Dept: FAMILY MEDICINE CLINIC | Age: 87
End: 2022-01-31

## 2022-01-31 NOTE — TELEPHONE ENCOUNTER
Yes please continue eliquis blood thinner med due to abnormal heart rhythm atrial fibrillation. This med helps prevent stroke .       Thanks  Refill sent

## 2022-01-31 NOTE — TELEPHONE ENCOUNTER
eliquis 2.5 mg    [id number] was wanting to know if Yandy Nieves wanted her to continue this medication or not. If so can she get a refill?     Health Maintenance   Topic Date Due    COVID-19 Vaccine (1) Never done    DTaP/Tdap/Td vaccine (1 - Tdap) Never done    Shingles Vaccine (1 of 2) Never done    Flu vaccine (1) Never done    Depression Screen  04/19/2022    Annual Wellness Visit (AWV)  04/20/2022    DEXA (modify frequency per FRAX score)  Completed    Pneumococcal 65+ years Vaccine  Completed    Hepatitis A vaccine  Aged Out    Hepatitis B vaccine  Aged Out    Hib vaccine  Aged Out    Meningococcal (ACWY) vaccine  Aged Out             (applicable per patient's age: Cancer Screenings, Depression Screening, Fall Risk Screening, Immunizations)    LDL Cholesterol (mg/dL)   Date Value   07/09/2021 106     AST (U/L)   Date Value   07/09/2021 20     ALT (U/L)   Date Value   07/09/2021 9     BUN (mg/dL)   Date Value   11/18/2021 15      (goal A1C is < 7)   (goal LDL is <100) need 30-50% reduction from baseline     BP Readings from Last 3 Encounters:   11/18/21 (!) 96/53   10/25/21 136/80   07/12/21 120/60    (goal /80)      All Future Testing planned in [facility]:  Lab Frequency Next Occurrence   TSH with Reflex Once 06/12/2022   CBC Auto Differential Once 06/12/2022   Comprehensive Metabolic Panel Once 95/45/7029   Vitamin D 25 Hydroxy Once 06/12/2022   Lipid Panel Once 06/12/2022   Magnesium Once 06/12/2022   EKG 12 Lead Once 06/12/2022   XR CHEST (2 VW) Once 06/12/2022       Next Visit Date:  Future Appointments   Date Time Provider Shana Nava   5/2/2022  5:00 PM ANNA MARIE Belcher [facility]   7/7/2022 10:00 AM MD Prabha [facility]            Patient Active Problem List:     Left carotid bruit     Corn of foot     Family history of heart disease     Chronic cough     Acquired deviated nasal septum     Paroxysmal SVT (supraventricular tachycardia) ([facility]

## 2022-04-05 RX ORDER — DILTIAZEM HYDROCHLORIDE 90 MG/1
90 CAPSULE, EXTENDED RELEASE ORAL DAILY
Qty: 90 CAPSULE | Refills: 3 | Status: SHIPPED | OUTPATIENT
Start: 2022-04-05 | End: 2022-09-19 | Stop reason: SDUPTHER

## 2022-04-05 RX ORDER — MIDODRINE HYDROCHLORIDE 5 MG/1
5 TABLET ORAL 2 TIMES DAILY
Qty: 60 TABLET | Refills: 2 | Status: SHIPPED | OUTPATIENT
Start: 2022-04-05 | End: 2022-05-27

## 2022-04-05 NOTE — TELEPHONE ENCOUNTER
Patient now uses mail order due to .     [facility]       Can not get local each is over 400.00

## 2022-05-02 ENCOUNTER — OFFICE VISIT (OUTPATIENT)
Dept: FAMILY MEDICINE CLINIC | Age: 87
End: 2022-05-02
Payer: MEDICARE

## 2022-05-02 VITALS
OXYGEN SATURATION: 95 % | BODY MASS INDEX: 20.83 KG/M2 | DIASTOLIC BLOOD PRESSURE: 68 MMHG | HEART RATE: 62 BPM | SYSTOLIC BLOOD PRESSURE: 110 MMHG | WEIGHT: 125 LBS | HEIGHT: 65 IN

## 2022-05-02 DIAGNOSIS — Z00.00 MEDICARE ANNUAL WELLNESS VISIT, SUBSEQUENT: Primary | ICD-10-CM

## 2022-05-02 DIAGNOSIS — I48.91 ATRIAL FIBRILLATION, UNSPECIFIED TYPE (HCC): ICD-10-CM

## 2022-05-02 DIAGNOSIS — R09.89 LEFT CAROTID BRUIT: ICD-10-CM

## 2022-05-02 PROCEDURE — 4040F PNEUMOC VAC/ADMIN/RCVD: CPT | Performed by: NURSE PRACTITIONER

## 2022-05-02 PROCEDURE — G0439 PPPS, SUBSEQ VISIT: HCPCS | Performed by: NURSE PRACTITIONER

## 2022-05-02 PROCEDURE — 1123F ACP DISCUSS/DSCN MKR DOCD: CPT | Performed by: NURSE PRACTITIONER

## 2022-05-02 RX ORDER — VIT C/E/ZN/COPPR/LUTEIN/ZEAXAN 250 MG-2MG
CAPSULE ORAL DAILY
COMMUNITY

## 2022-05-02 SDOH — ECONOMIC STABILITY: FOOD INSECURITY: WITHIN THE PAST 12 MONTHS, THE FOOD YOU BOUGHT JUST DIDN'T LAST AND YOU DIDN'T HAVE MONEY TO GET MORE.: NEVER TRUE

## 2022-05-02 SDOH — ECONOMIC STABILITY: FOOD INSECURITY: WITHIN THE PAST 12 MONTHS, YOU WORRIED THAT YOUR FOOD WOULD RUN OUT BEFORE YOU GOT MONEY TO BUY MORE.: NEVER TRUE

## 2022-05-02 ASSESSMENT — PATIENT HEALTH QUESTIONNAIRE - PHQ9
SUM OF ALL RESPONSES TO PHQ9 QUESTIONS 1 & 2: 0
SUM OF ALL RESPONSES TO PHQ QUESTIONS 1-9: 0
1. LITTLE INTEREST OR PLEASURE IN DOING THINGS: 0
SUM OF ALL RESPONSES TO PHQ QUESTIONS 1-9: 0
2. FEELING DOWN, DEPRESSED OR HOPELESS: 0

## 2022-05-02 ASSESSMENT — SOCIAL DETERMINANTS OF HEALTH (SDOH): HOW HARD IS IT FOR YOU TO PAY FOR THE VERY BASICS LIKE FOOD, HOUSING, MEDICAL CARE, AND HEATING?: NOT HARD AT ALL

## 2022-05-02 ASSESSMENT — LIFESTYLE VARIABLES: HOW OFTEN DO YOU HAVE A DRINK CONTAINING ALCOHOL: NEVER

## 2022-05-02 NOTE — PATIENT INSTRUCTIONS
You may be receiving a survey from [facility] regarding your visit today. You may get this in the mail, through your MyChart or in your email. Please complete the survey to enable us to provide the highest quality of care to you and your family. If you cannot score us as very good ( 5 Stars) on any question, please feel free to call the office to discuss how we could have made your experience exceptional.     Thank You! ANNA MARIE Arrieta-CNP  [address]        Phone: [phone]  Fax: [facility] [facility] [place] Office Hours:  Monday: [facility] office location 8-5 ([phone]) Offering additional late hours the first Monday of the month until 7 pm.   Tuesday: 8-5 Wednesday: 8-5 Thursday:  Additional hours offered 2 Thursdays a month. Please call to inquire those dates. Fridays: 7:30-4:30    Preventing falls is a special concern due to an increase in the likelihood of fracturing a bone. Factors affecting FALLS include: environmental factors, impaired vision or balance, chronic diseases that affect mental or physical functioning and certain medications (sedatives, antidepressants, benzodiazepines, narcotics and alcohol).    Here are some tips to eliminate environmental factors that can lead to falls    Outdoors:  -use a cane or walker for added stability  -wear rubber-soled shoes for traction  -walk on grass when sidewalks are slippery  -In winter, carry salt or aakash litter to sprinkle on slippery sidewalks.   -be careful on highly polished floors that become slick and dangerous when wet  -walk on even or level ground when able    Indoors:  -Keep rooms free of clutter, especially the floors  -Keep floor surfaces smooth but not slippery  -Wear supportive, low-heeled shoes even at home  -Avoid walking in socks, stockings or slippers.  -Be sure carpets and area rugs have skid-proof backing or are tacked to the floor  -Install grab bars on the bathroom walls near tub, shower and toilet. -Use a rubber bath mat in shower or tub  -Keep a flashlight with fresh batteries beside your bed.   -If using a step stool for hard-to-reach places, use a sturdy one with a handrails on both sides. OR  As a family member to help.   -Add ceiling fixtures to rooms lit by lamps. -Consider purchasing a cordless phone so that you do not have to rush to answer the phone when it rings, or so that you can call for help if you do fall.

## 2022-05-02 NOTE — PROGRESS NOTES
Medicare Annual Wellness Visit    Sheldon Berkowitz is here for Medicare AWV, Hypotension (Pt states her BP runs low, pt states she still gets lightheaded), and Tachycardia ((SVT) 6 month check up. )    Assessment & Plan   Medicare annual wellness visit, subsequent  Atrial fibrillation, unspecified type ([address]  Left carotid bruit      Recommendations for Preventive Services Due: see orders and patient instructions/AVS.  Recommended screening schedule for the next 5-10 years is provided to the patient in written form: see Patient Instructions/AVS.     Return for Medicare Annual Wellness Visit in 1 year. Subjective   The following acute and/or chronic problems were also addressed today:    Patient's complete Health Risk Assessment and screening values have been reviewed and are found in Flowsheets. The following problems were reviewed today and where indicated follow up appointments were made and/or referrals ordered.     Positive Risk Factor Screenings with Interventions:    Fall Risk:  Do you feel unsteady or are you worried about falling? : (!) yes  2 or more falls in past year?: no  Fall with injury in past year?: no     Fall Risk Interventions:    · Home safety tips provided              General Health and ACP:  General  In general, how would you say your health is?: Good  In the past 7 days, have you experienced any of the following: New or Increased Pain, New or Increased Fatigue, Loneliness, Social Isolation, Stress or Anger?: No  Do you get the social and emotional support that you need?: Yes  Do you have a Living Will?: Yes    Advance Directives     Power of  Living Will ACP-Advance Directive ACP-Power of     Not on File Not on File Not on File Not on File      General Health Risk Interventions:  · No Living Will: Advance Care Planning addressed with patient today     Hearing/Vision:  Do you or your family notice any trouble with your hearing that hasn't been managed with hearing aids?: (!) Yes  Do you have difficulty driving, watching TV, or doing any of your daily activities because of your eyesight?: (!) Yes  Have you had an eye exam within the past year?: (!) No  No exam data present    Hearing/Vision Interventions:  · Hearing concerns:  patient declines any further evaluation/treatment for hearing issues  · Vision concerns:  wears glasses     Safety:  Do you have working smoke detectors?: Yes  Do you have any tripping hazards - loose or unsecured carpets or rugs?: (!) Yes  Do you have any tripping hazards - clutter in doorways, halls, or stairs?: No  Do you have either shower bars, grab bars, non-slip mats or non-slip surfaces in your shower or bathtub?: Yes  Do all of your stairways have a railing or banister?: Yes  Do you always fasten your seatbelt when you are in a car?: Yes    Safety Interventions:  · Home safety tips provided           Objective   Vitals:    05/02/22 1659   BP: 110/68   Pulse: 62   SpO2: 95%   Weight: 125 lb (56.7 kg)   Height: 5' 5\" (1.651 m)      Body mass index is 20.8 kg/m².         General Appearance: alert and oriented to person, place and time, well developed and well- nourished, in no acute distress  Skin: warm and dry, no rash or erythema  Head: normocephalic and atraumatic  Eyes: pupils equal, round, and reactive to light, extraocular eye movements intact, conjunctivae normal  ENT: tympanic membrane, external ear and ear canal normal bilaterally, nose without deformity  Neck: supple and non-tender without mass, no thyromegaly or thyroid nodules, no cervical lymphadenopathy  Pulmonary/Chest: clear to auscultation bilaterally- no wheezes, rales or rhonchi, normal air movement, no respiratory distress  Cardiovascular: normal rate, regular rhythm, normal S1 and S2, no murmurs, rubs, clicks, or gallops, distal pulses intact, left carotid bruits  Abdomen: soft, non-tender, non-distended, normal bowel sounds, no masses or organomegaly  Extremities: no cyanosis, clubbing or edema  Musculoskeletal: normal range of motion, no joint swelling, deformity or tenderness  Neurologic:  gait, coordination and speech normal       No Known Allergies  Prior to Visit Medications    Medication Sig Taking? Authorizing Provider   Multiple Vitamins-Minerals (EYE MULTIVITAMIN) CAPS Take by mouth daily Eye promise Yes Historical Provider, MD   Nutritional Supplements (GLUCERNA HUNGER SMART SHAKE PO) Take by mouth daily Yes Historical Provider, MD   apixaban (ELIQUIS) 2.5 MG TABS tablet Take 1 tablet by mouth 2 times daily For atrial fibrillation Yes German Form, ANNA MARIE - RUI   dilTIAZem (CARDIZEM 12 HR) 90 MG extended release capsule Take 1 capsule by mouth daily Yes German Form, ANNA MARIE - CNP   midodrine (PROAMATINE) 5 MG tablet Take 1 tablet by mouth 2 times daily For low bp Yes German Form, ANNA MARIE - CNP   glucosamine-chondroitin 500-400 MG CAPS Take 1 capsule by mouth daily Yes Historical Provider, MD   Cholecalciferol (VITAMIN D3) 125 MCG (5000 UT) TABS Take 5,000 Units by mouth daily Indications: [employer] organic cocnut oil Yes Historical Provider, MD   calcium carbonate 600 MG TABS tablet Take 1 tablet by mouth daily Yes Historical Provider, MD   Multiple Vitamins-Minerals (THERAPEUTIC MULTIVITAMIN-MINERALS) tablet Take 1 tablet by mouth daily Yes Historical Provider, MD   aspirin-acetaminophen-caffeine (EXCEDRIN MIGRAINE) [id number] MG per tablet Take 1 tablet by mouth every 6 hours as needed for Headaches  Patient not taking: Reported on 5/2/2022  Historical Provider, MD Brito (Including outside providers/suppliers regularly involved in providing care):   Patient Care Team:  ANNA MARIE Turner CNP as PCP - General  German FormANNA MARIE CNP as PCP - [facility] Empaneled Provider    Reviewed and updated this visit:  Tobacco  Allergies  Meds  Problems  Med Hx  Surg Hx  Soc Hx  Fam Hx       1. Medicare annual wellness visit, subsequent      2.  Atrial fibrillation, unspecified type ([address]  Rate and rhythm controlled. Rare dizziness. L carotid bruit stable.      3. Left carotid bruit

## 2022-05-27 RX ORDER — MIDODRINE HYDROCHLORIDE 5 MG/1
TABLET ORAL
Qty: 60 TABLET | Refills: 11 | Status: SHIPPED | OUTPATIENT
Start: 2022-05-27

## 2022-07-11 ENCOUNTER — HOSPITAL ENCOUNTER (OUTPATIENT)
Age: 87
Discharge: HOME | End: 2022-07-11
Payer: MEDICARE

## 2022-07-11 DIAGNOSIS — E55.9 VITAMIN D DEFICIENCY DISEASE: ICD-10-CM

## 2022-07-11 DIAGNOSIS — I47.1 PAROXYSMAL SUPRAVENTRICULAR TACHYCARDIA (HCC): ICD-10-CM

## 2022-07-11 DIAGNOSIS — I11.9 MALIGNANT HYPERTENSIVE HEART DISEASE WITHOUT HEART FAILURE: ICD-10-CM

## 2022-07-11 LAB
ABSOLUTE EOS #: 0.2 K/UL (ref 0–0.4)
ABSOLUTE LYMPH #: 1.3 K/UL (ref 1–4.8)
ABSOLUTE MONO #: 0.5 K/UL (ref 0–1)
ALBUMIN SERPL-MCNC: 4.3 G/DL (ref 3.5–5.2)
ALP BLD-CCNC: 55 U/L (ref 35–104)
ALT SERPL-CCNC: 9 U/L (ref 5–33)
ANION GAP SERPL CALCULATED.3IONS-SCNC: 9 MMOL/L (ref 9–17)
AST SERPL-CCNC: 16 U/L
BASOPHILS # BLD: 0 % (ref 0–2)
BASOPHILS ABSOLUTE: 0 K/UL (ref 0–0.2)
BILIRUB SERPL-MCNC: 0.77 MG/DL (ref 0.3–1.2)
BUN BLDV-MCNC: 13 MG/DL (ref 8–23)
BUN/CREAT BLD: 19 (ref 9–20)
CALCIUM SERPL-MCNC: 9.7 MG/DL (ref 8.6–10.4)
CHLORIDE BLD-SCNC: 102 MMOL/L (ref 98–107)
CHOLESTEROL/HDL RATIO: 2.6
CHOLESTEROL: 157 MG/DL
CO2: 29 MMOL/L (ref 20–31)
CREAT SERPL-MCNC: 0.68 MG/DL (ref 0.5–0.9)
DIFFERENTIAL TYPE: YES
EOSINOPHILS RELATIVE PERCENT: 4 % (ref 0–5)
GFR AFRICAN AMERICAN: >60 ML/MIN
GFR NON-AFRICAN AMERICAN: >60 ML/MIN
GFR SERPL CREATININE-BSD FRML MDRD: NORMAL ML/MIN/{1.73_M2}
GLUCOSE BLD-MCNC: 98 MG/DL (ref 70–99)
HCT VFR BLD CALC: 37.5 % (ref 36–46)
HDLC SERPL-MCNC: 61 MG/DL
HEMOGLOBIN: 12.5 G/DL (ref 12–16)
LDL CHOLESTEROL: 84 MG/DL (ref 0–130)
LYMPHOCYTES # BLD: 25 % (ref 15–40)
MAGNESIUM: 1.8 MG/DL (ref 1.6–2.6)
MCH RBC QN AUTO: 30.9 PG (ref 26–34)
MCHC RBC AUTO-ENTMCNC: 33.4 G/DL (ref 31–37)
MCV RBC AUTO: 92.5 FL (ref 80–100)
MONOCYTES # BLD: 10 % (ref 4–8)
PATIENT FASTING?: YES
PDW BLD-RTO: 13.6 % (ref 12.1–15.2)
PLATELET # BLD: 207 K/UL (ref 140–450)
POTASSIUM SERPL-SCNC: 3.9 MMOL/L (ref 3.7–5.3)
RBC # BLD: 4.05 M/UL (ref 4–5.2)
SEG NEUTROPHILS: 61 % (ref 47–75)
SEGMENTED NEUTROPHILS ABSOLUTE COUNT: 3.2 K/UL (ref 2.5–7)
SODIUM BLD-SCNC: 140 MMOL/L (ref 135–144)
TOTAL PROTEIN: 7.3 G/DL (ref 6.4–8.3)
TRIGL SERPL-MCNC: 60 MG/DL
TSH SERPL DL<=0.05 MIU/L-ACNC: 3.5 UIU/ML (ref 0.3–5)
VITAMIN D 25-HYDROXY: 81.5 NG/ML
WBC # BLD: 5.3 K/UL (ref 3.5–11)

## 2022-07-11 PROCEDURE — 93005 ELECTROCARDIOGRAM TRACING: CPT

## 2022-07-11 PROCEDURE — 85025 COMPLETE CBC W/AUTO DIFF WBC: CPT

## 2022-07-11 PROCEDURE — 36415 COLL VENOUS BLD VENIPUNCTURE: CPT

## 2022-07-11 PROCEDURE — 82306 VITAMIN D 25 HYDROXY: CPT

## 2022-07-11 PROCEDURE — 84443 ASSAY THYROID STIM HORMONE: CPT

## 2022-07-11 PROCEDURE — 83735 ASSAY OF MAGNESIUM: CPT

## 2022-07-11 PROCEDURE — 80061 LIPID PANEL: CPT

## 2022-07-11 PROCEDURE — 80053 COMPREHEN METABOLIC PANEL: CPT

## 2022-07-12 LAB
EKG ATRIAL RATE: 63 BPM
EKG P AXIS: 57 DEGREES
EKG P-R INTERVAL: 160 MS
EKG Q-T INTERVAL: 424 MS
EKG QRS DURATION: 90 MS
EKG QTC CALCULATION (BAZETT): 433 MS
EKG R AXIS: 27 DEGREES
EKG T AXIS: 66 DEGREES
EKG VENTRICULAR RATE: 63 BPM

## 2022-07-12 PROCEDURE — 93010 ELECTROCARDIOGRAM REPORT: CPT | Performed by: INTERNAL MEDICINE

## 2022-08-02 ENCOUNTER — OFFICE VISIT (OUTPATIENT)
Dept: CARDIOLOGY CLINIC | Age: 87
End: 2022-08-02
Payer: MEDICARE

## 2022-08-02 VITALS
HEART RATE: 84 BPM | BODY MASS INDEX: 20.14 KG/M2 | WEIGHT: 121 LBS | OXYGEN SATURATION: 96 % | SYSTOLIC BLOOD PRESSURE: 130 MMHG | DIASTOLIC BLOOD PRESSURE: 70 MMHG

## 2022-08-02 DIAGNOSIS — I11.9 MALIGNANT HYPERTENSIVE HEART DISEASE WITHOUT HEART FAILURE: ICD-10-CM

## 2022-08-02 DIAGNOSIS — I47.1 PAROXYSMAL SUPRAVENTRICULAR TACHYCARDIA (HCC): Primary | ICD-10-CM

## 2022-08-02 DIAGNOSIS — E55.9 VITAMIN D DEFICIENCY DISEASE: ICD-10-CM

## 2022-08-02 PROCEDURE — G8427 DOCREV CUR MEDS BY ELIG CLIN: HCPCS | Performed by: INTERNAL MEDICINE

## 2022-08-02 PROCEDURE — G8399 PT W/DXA RESULTS DOCUMENT: HCPCS | Performed by: INTERNAL MEDICINE

## 2022-08-02 PROCEDURE — 99214 OFFICE O/P EST MOD 30 MIN: CPT | Performed by: INTERNAL MEDICINE

## 2022-08-02 PROCEDURE — 1123F ACP DISCUSS/DSCN MKR DOCD: CPT | Performed by: INTERNAL MEDICINE

## 2022-08-02 PROCEDURE — 1036F TOBACCO NON-USER: CPT | Performed by: INTERNAL MEDICINE

## 2022-08-02 PROCEDURE — G8420 CALC BMI NORM PARAMETERS: HCPCS | Performed by: INTERNAL MEDICINE

## 2022-08-02 PROCEDURE — 1090F PRES/ABSN URINE INCON ASSESS: CPT | Performed by: INTERNAL MEDICINE

## 2022-08-02 NOTE — PROGRESS NOTES
Ov DR Alexandra Sheth 1 year follow up. No chest pain or sob  Some dizziness if stands up fast.   Seen in ED in November with sob  And dizziness a fib. Pt bp cuff checked for accuracy   Its good. Will see in 1 year.

## 2022-08-02 NOTE — LETTER
bruit due to a tortuous carotid artery with normal carotid ultrasound in 2017, at [facility]. FAMILY HISTORY:  Michael Figueroa had an MI. Father  of esophageal cancer. Brother passed away of ALS. Older brother had 3 MIs. SOCIAL HISTORY:  She is 82years old, 10 children. Daughter, Federico Young, lives in [place]. Son, Lupe Méndez, lives in [place]. She is . She does not smoke or drink alcohol.  was an . REVIEW OF SYSTEMS:  Cardiac as above. Other systems reviewed including constitutional, eyes, ears, nose and throat, cardiovascular, respiratory, GI, , musculoskeletal, integumentary, neurologic, endocrine, hematologic and allergic/immunologic are negative except for what is described above. No weight loss or weight gain. No change in bowel habits. No blood in stools. No fevers, sweats or chills. PHYSICAL EXAMINATION:  VITAL SIGNS:  Her blood pressure was 130/70 with a heart rate of 84 and regular. Respiratory rate 18. O2 saturation 96%. Weight 121 pounds. GENERAL:  She is a pleasant 42-year-old female. Denied pain. She was oriented to person, place and time. Answered questions appropriately. SKIN:  No unusual skin changes. HEENT:  The pupils are equally round and intact. Mucous membranes were dry. NECK:  No JVD. Good carotid pulses. Left carotid bruit. No lymphadenopathy or thyromegaly. CARDIOVASCULAR EXAM:  S1 and S2 were normal.  No S3 or S4. Soft systolic blowing type murmur. No diastolic murmur. PMI was normal.  No lift, thrust, or pericardial friction rub. LUNGS:  Clear to auscultation and percussion. ABDOMEN:  Soft and nontender. Good bowel sounds. EXTREMITIES:  Good femoral pulses. Good pedal pulses. No pedal edema. Skin was warm and dry. No calf tenderness. Nail beds pink. Good cap refill. PULSES:  Bilateral symmetrical radial, brachial and carotid pulses. Left carotid bruit. Good femoral and pedal pulses.   NEUROLOGIC EXAM:  Within normal limits. PSYCHIATRIC EXAM:  Within normal limits. LABORATORY DATA:  Sodium 140, potassium 3.9, BUN 13, creatinine 0.68, GFR greater than 60. Magnesium 1.8, calcium was 9.7. Cholesterol 157, HDL 61, LDL 84, triglycerides 60. ALT was 9, AST was 16. TSH was 3.50. Vitamin D 81.5. White count 5.3, hemoglobin 12.5 with a platelet count 542,445. EKG showed normal sinus rhythm and was normal.    Chest x-ray was not done. IMPRESSION:  1. History of SVT. 2.  Atrial fibrillation diagnosed when she went to emergency room on 11/18/2021, was found to be in atrial fibrillation with RVR, was converted to sinus rhythm in the emergency room. 3.  Hypotension, on midodrine. 4.  Left carotid bruit. PLAN:  1. No change in medications. 2.  We will see in 1 year. DISCUSSION:  Mrs. Krysta Preston overall is doing well. She has had no chest pain, shortness of breath, or loss of energy. Her blood pressure runs low at home in the 100 to 110 range; however, she remains asymptomatic. She is on midodrine. She has had no further episodes of atrial fibrillation. I will plan on seeing her in 1 year if the problem would develop. There is no indication that she is having any anginal-type symptoms. Thank you very much for allowing me the privilege of seeing Mrs. Krysta Preston. If you have any questions on my thoughts, please do not hesitate to contact me.     Sincerely,        Thomas Chilel    D: 08/08/2022 2:50:02     T: 08/08/2022 11:30:39     [email or web address]  Job#: [id number]   Doc#: [id number]

## 2022-08-10 NOTE — PROGRESS NOTES
Alexandre Mcgee M.D. [address] [place] [address]  [phone]        2022        Killian Reynolds, CNP  [address] [facility] [address]    RE:   Dg Peace  :  1935    [facility]:    CHIEF COMPLAINT:  1. SVT and atrial fibrillation. 2.  Mild hypotension. HISTORY OF PRESENT ILLNESS:  I had the pleasure of seeing Mrs. Cherri Davies in our office on 2022. She is a pleasant 51-year-old female with a long history of SVT. She had a Holter monitor in 2017, that showed occasional short burst of SVT. She had an echo and stress test in 2019, that showed normal LV function. Her stress test was unremarkable. She was seen in the emergency room on 2021. EKG at that time showed atrial fibrillation with RVR. She converted to sinus rhythm in the emergency room. She was placed on Eliquis at that time at 2.5 mg b.i.d. This was the first that atrial fibrillation had been diagnosed. She has done well since then. She takes her blood pressure on a regular basis at home and is well controlled. She has had no PND, orthopnea, or pedal edema. She has no complaints as I see her today. She has never had a myocardial infarction and never had a cardiac catheterization. CARDIAC RISK FACTORS:  Prior MI:  Negative. Hypertension:  Negative. Hyperlipidemia:  Negative. Other Family Members:  Positive. Smoking:  Negative. Diabetes:  Negative. MEDICATIONS AT HOME:  She is currently on Eliquis 2.5 mg b.i.d., calcium 600 mg daily, Cardizem CD 90 mg daily, midodrine 5 mg b.i.d. PAST MEDICAL AND SURGICAL HISTORY:  1. History of SVT, but now diagnosed also with atrial fibrillation on 2021, when she went to the emergency room for palpitations. 2.  On Eliquis 2.5 mg b.i.d.  3.  Cataract surgery on the right eye on 2015, left side on 10/01/2015.   4.  Cholecystectomy in .  5.  Left carotid bruit due to a tortuous carotid artery with normal carotid ultrasound in 2017, at [facility]. FAMILY HISTORY:  Clark Lewis had an MI. Father  of esophageal cancer. Brother passed away of ALS. Older brother had 3 MIs. SOCIAL HISTORY:  She is 82years old, 10 children. Daughter, Jo Her, lives in [place]. Son, Tony Law, lives in [place]. She is . She does not smoke or drink alcohol.  was an . REVIEW OF SYSTEMS:  Cardiac as above. Other systems reviewed including constitutional, eyes, ears, nose and throat, cardiovascular, respiratory, GI, , musculoskeletal, integumentary, neurologic, endocrine, hematologic and allergic/immunologic are negative except for what is described above. No weight loss or weight gain. No change in bowel habits. No blood in stools. No fevers, sweats or chills. PHYSICAL EXAMINATION:  VITAL SIGNS:  Her blood pressure was 130/70 with a heart rate of 84 and regular. Respiratory rate 18. O2 saturation 96%. Weight 121 pounds. GENERAL:  She is a pleasant 79-year-old female. Denied pain. She was oriented to person, place and time. Answered questions appropriately. SKIN:  No unusual skin changes. HEENT:  The pupils are equally round and intact. Mucous membranes were dry. NECK:  No JVD. Good carotid pulses. Left carotid bruit. No lymphadenopathy or thyromegaly. CARDIOVASCULAR EXAM:  S1 and S2 were normal.  No S3 or S4. Soft systolic blowing type murmur. No diastolic murmur. PMI was normal.  No lift, thrust, or pericardial friction rub. LUNGS:  Clear to auscultation and percussion. ABDOMEN:  Soft and nontender. Good bowel sounds. EXTREMITIES:  Good femoral pulses. Good pedal pulses. No pedal edema. Skin was warm and dry. No calf tenderness. Nail beds pink. Good cap refill. PULSES:  Bilateral symmetrical radial, brachial and carotid pulses. Left carotid bruit. Good femoral and pedal pulses.   NEUROLOGIC EXAM:  Within normal limits. PSYCHIATRIC EXAM:  Within normal limits. LABORATORY DATA:  Sodium 140, potassium 3.9, BUN 13, creatinine 0.68, GFR greater than 60. Magnesium 1.8, calcium was 9.7. Cholesterol 157, HDL 61, LDL 84, triglycerides 60. ALT was 9, AST was 16. TSH was 3.50. Vitamin D 81.5. White count 5.3, hemoglobin 12.5 with a platelet count 800,211. EKG showed normal sinus rhythm and was normal.    Chest x-ray was not done. IMPRESSION:  1. History of SVT. 2.  Atrial fibrillation diagnosed when she went to emergency room on 11/18/2021, was found to be in atrial fibrillation with RVR, was converted to sinus rhythm in the emergency room. 3.  Hypotension, on midodrine. 4.  Left carotid bruit. PLAN:  1. No change in medications. 2.  We will see in 1 year. DISCUSSION:  Mrs. Niki Fournier overall is doing well. She has had no chest pain, shortness of breath, or loss of energy. Her blood pressure runs low at home in the 100 to 110 range; however, she remains asymptomatic. She is on midodrine. She has had no further episodes of atrial fibrillation. I will plan on seeing her in 1 year if the problem would develop. There is no indication that she is having any anginal-type symptoms. Thank you very much for allowing me the privilege of seeing Mrs. Niki Fournier. If you have any questions on my thoughts, please do not hesitate to contact me.     Sincerely,        Jennie Phillips    D: 08/08/2022 2:50:02     T: 08/08/2022 11:30:39     [email or web address]  Job#: [id number]   Doc#: [id number]

## 2022-09-01 RX ORDER — APIXABAN 2.5 MG/1
TABLET, FILM COATED ORAL
Qty: 180 TABLET | Refills: 1 | Status: SHIPPED | OUTPATIENT
Start: 2022-09-01

## 2022-09-09 ENCOUNTER — TELEPHONE (OUTPATIENT)
Dept: PRIMARY CARE CLINIC | Age: 87
End: 2022-09-09

## 2022-09-09 DIAGNOSIS — R50.9 FEVER, UNKNOWN ORIGIN: Primary | ICD-10-CM

## 2022-09-09 NOTE — TELEPHONE ENCOUNTER
Pt with fever x 10 days feel tired and wore out. Requested  urinalysis .        Equinunk Acosta THRASHER CNP

## 2022-09-10 ENCOUNTER — HOSPITAL ENCOUNTER (OUTPATIENT)
Age: 87
Discharge: HOME | End: 2022-09-10
Payer: MEDICARE

## 2022-09-10 DIAGNOSIS — R50.9 FEVER, UNKNOWN ORIGIN: ICD-10-CM

## 2022-09-10 LAB
-: ABNORMAL
BACTERIA: ABNORMAL
BILIRUBIN URINE: NEGATIVE
COLOR: ABNORMAL
COMMENT UA: ABNORMAL
EPITHELIAL CELLS UA: ABNORMAL /HPF
GLUCOSE URINE: NEGATIVE
KETONES, URINE: ABNORMAL
LEUKOCYTE ESTERASE, URINE: ABNORMAL
NITRITE, URINE: NEGATIVE
PH UA: 5 (ref 5–8)
PROTEIN UA: ABNORMAL
RBC UA: ABNORMAL /HPF (ref 0–2)
SPECIFIC GRAVITY UA: 1.03 (ref 1–1.03)
TURBIDITY: ABNORMAL
URINE HGB: ABNORMAL
UROBILINOGEN, URINE: NORMAL
WBC UA: ABNORMAL /HPF

## 2022-09-10 PROCEDURE — 87086 URINE CULTURE/COLONY COUNT: CPT

## 2022-09-10 PROCEDURE — 81001 URINALYSIS AUTO W/SCOPE: CPT

## 2022-09-10 RX ORDER — SULFAMETHOXAZOLE AND TRIMETHOPRIM 800; 160 MG/1; MG/1
1 TABLET ORAL 2 TIMES DAILY
Qty: 10 TABLET | Refills: 0 | Status: SHIPPED | OUTPATIENT
Start: 2022-09-10 | End: 2022-09-15

## 2022-09-11 LAB
CULTURE: NORMAL
SPECIMEN DESCRIPTION: NORMAL

## 2022-09-19 RX ORDER — DILTIAZEM HYDROCHLORIDE 90 MG/1
90 CAPSULE, EXTENDED RELEASE ORAL DAILY
Qty: 90 CAPSULE | Refills: 3 | Status: SHIPPED | OUTPATIENT
Start: 2022-09-19 | End: 2022-09-19 | Stop reason: SDUPTHER

## 2022-09-19 RX ORDER — DILTIAZEM HYDROCHLORIDE 90 MG/1
90 CAPSULE, EXTENDED RELEASE ORAL DAILY
Qty: 90 CAPSULE | Refills: 3 | Status: SHIPPED | OUTPATIENT
Start: 2022-09-19

## 2022-09-19 NOTE — PROGRESS NOTES
Spoke with patient and she has broke her fever with the small amount of Bactrim use for recent urinary tract infection and feeling better    Patient also conveys she is out of her diltiazem medication did not realize that she was out which is taking the midodrine so it will be sent to local pharmacy at [facility] and then also to [facility] [address] she has had difficulty getting this medication from [facility] so we need to call and check on that

## 2022-09-23 RX ORDER — DILTIAZEM HYDROCHLORIDE 90 MG/1
90 CAPSULE, EXTENDED RELEASE ORAL DAILY
Qty: 90 CAPSULE | Refills: 3 | OUTPATIENT
Start: 2022-09-23

## 2022-11-07 ENCOUNTER — OFFICE VISIT (OUTPATIENT)
Dept: FAMILY MEDICINE CLINIC | Age: 87
End: 2022-11-07
Payer: MEDICARE

## 2022-11-07 VITALS
WEIGHT: 125 LBS | HEIGHT: 65 IN | HEART RATE: 60 BPM | SYSTOLIC BLOOD PRESSURE: 120 MMHG | BODY MASS INDEX: 20.83 KG/M2 | DIASTOLIC BLOOD PRESSURE: 64 MMHG | OXYGEN SATURATION: 97 %

## 2022-11-07 DIAGNOSIS — R19.4 CHANGE IN BOWEL HABITS: ICD-10-CM

## 2022-11-07 DIAGNOSIS — I47.1 PAROXYSMAL SVT (SUPRAVENTRICULAR TACHYCARDIA) (HCC): Primary | ICD-10-CM

## 2022-11-07 DIAGNOSIS — R09.89 LEFT CAROTID BRUIT: ICD-10-CM

## 2022-11-07 DIAGNOSIS — I48.91 ATRIAL FIBRILLATION, UNSPECIFIED TYPE (HCC): ICD-10-CM

## 2022-11-07 DIAGNOSIS — R19.7 DIARRHEA, UNSPECIFIED TYPE: ICD-10-CM

## 2022-11-07 DIAGNOSIS — Z12.11 SCREENING FOR COLON CANCER: ICD-10-CM

## 2022-11-07 DIAGNOSIS — Z12.31 SCREENING MAMMOGRAM, ENCOUNTER FOR: ICD-10-CM

## 2022-11-07 PROCEDURE — 99214 OFFICE O/P EST MOD 30 MIN: CPT | Performed by: NURSE PRACTITIONER

## 2022-11-07 PROCEDURE — 1090F PRES/ABSN URINE INCON ASSESS: CPT | Performed by: NURSE PRACTITIONER

## 2022-11-07 PROCEDURE — G8484 FLU IMMUNIZE NO ADMIN: HCPCS | Performed by: NURSE PRACTITIONER

## 2022-11-07 PROCEDURE — G8420 CALC BMI NORM PARAMETERS: HCPCS | Performed by: NURSE PRACTITIONER

## 2022-11-07 PROCEDURE — G8427 DOCREV CUR MEDS BY ELIG CLIN: HCPCS | Performed by: NURSE PRACTITIONER

## 2022-11-07 PROCEDURE — G8399 PT W/DXA RESULTS DOCUMENT: HCPCS | Performed by: NURSE PRACTITIONER

## 2022-11-07 PROCEDURE — 1036F TOBACCO NON-USER: CPT | Performed by: NURSE PRACTITIONER

## 2022-11-07 PROCEDURE — 1123F ACP DISCUSS/DSCN MKR DOCD: CPT | Performed by: NURSE PRACTITIONER

## 2022-11-07 RX ORDER — CHOLESTYRAMINE 4 G/9G
1 POWDER, FOR SUSPENSION ORAL DAILY
Qty: 30 PACKET | Refills: 0 | Status: SHIPPED | OUTPATIENT
Start: 2022-11-07

## 2022-11-07 NOTE — PATIENT INSTRUCTIONS
Phone: [phone]  Fax: [phone] Office Hours:  Monday: [place] office location [phone] ([phone]) Offering additional late hours the first Monday of the month until 7 pm.   Tuesday: 8-5 Wednesday: 8-5 Thursday:  Additional hours offered 2 Thursdays a month. Please call to inquire those dates. Fridays: 7:30-4:30   SURVEY:    You may be receiving a survey from [employer] regarding your visit today. Please complete the survey to enable us to provide the highest quality of care to you and your family. If you cannot score us a very good on any question, please call the office to discuss how we could have made your experience a very good one. Thank you.

## 2022-11-07 NOTE — PROGRESS NOTES
[facility]  [facility] [place] [address] [place] [id number]  Dept: [phone]     Subjective:  Eddie Egan is a 82 y.o. female presenting today for routine follow up of hypertension, hyperlipidemia, and impaired fasting glucose and recent labs. She denies chest pain, shortness of breath or palpitations. She denies headaches, vision changes and lightheadedness. She denies myalgias. She denies numbness and tingling in the hands and feet. She has been compliant with diet and exercise. She has been compliant with her medications. She is tolerating medications. Hypertension: well controlled with current medications   Medications: continue current medication doses     Hyperlipidemia: well controlled with current medications   Medications: continue current medication doses   Recommended low cholesterol diet     SVT, hypotension  Pt brought log of bp readings. Taking cardizem 90 mg once a day, no racing heart. 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 Will check with cardiology to consider beta blocker. Mammogram agreeable to do , will schedule. Does SBE    C/o diarrhea likely post gallbladder but willing to rule out infectious causes too. Pt with hx cholecystectomy, usually goes daily with 3 bm daily sometimes. Not pure liquid some formed. Start to monitor closer   Refer to GI for colonoscopy with colon cancer risk. /father with hx esophageal cancer. Family History   Problem Relation Age of Onset    Cancer Father 71        esophagus    Other Brother 71        ALS    Heart Disease Brother      Start questran 1 packet daily sprinkle on food. Overall feels well, blessed to watch great grandchildren some and babysit.      Impaired fasting glucose:  stable  Recommend low carb diet    Nutrition Status:  well developed, well nourished   Recommend continue current healthy lifestyle with diet and regular exercise         CURRENT ALLERGIES: Patient has no known allergies. SOCIAL HISTORY:   Social History     Tobacco Use    Smoking status: Never     Passive exposure: Yes    Smokeless tobacco: Never   Vaping Use    Vaping Use: Never used   Substance Use Topics    Alcohol use: Never     Alcohol/week: 0.0 standard drinks    Drug use: No       HPI       Medication List            Accurate as of November 7, 2022  6:09 PM. If you have any questions, ask your nurse or doctor. CONTINUE taking these medications      calcium carbonate 600 MG Tabs tablet     dilTIAZem 90 MG extended release capsule  Commonly known as: CARDIZEM 12 HR  Take 1 capsule by mouth daily     Eliquis 2.5 MG Tabs tablet  Generic drug: apixaban  TAKE 1 TABLET TWICE A DAY FOR ATRIAL FIBRILLATION     GLUCERNA HUNGER SMART SHAKE PO     glucosamine-chondroitin 500-400 MG Caps     midodrine 5 MG tablet  Commonly known as: PROAMATINE  TAKE 1 TABLET TWICE A DAY FOR LOW BLOOD PRESSURE     * Eye Multivitamin Caps     * therapeutic multivitamin-minerals tablet     Vitamin D3 125 MCG (5000 UT) Tabs           * This list has 2 medication(s) that are the same as other medications prescribed for you. Read the directions carefully, and ask your doctor or other care provider to review them with you.                      Review of Systems    Objective:  /64 (Site: Left Upper Arm, Position: Sitting)   Pulse 60   Ht 5' 5\" (1.651 m)   Wt 125 lb (56.7 kg)   SpO2 97%   BMI 20.80 kg/m²   Physical Exam      Diagnostic Data:  Lab Results   Component Value Date    GLUCOSE 98 07/11/2022     Lab Results   Component Value Date    BUN 13 07/11/2022    CREATININE 0.68 07/11/2022     07/11/2022    K 3.9 07/11/2022    CALCIUM 9.7 07/11/2022     07/11/2022    CO2 29 07/11/2022    LABGLOM >60 07/11/2022     Lab Results   Component Value Date    CHOL 157 07/11/2022    HDL 61 07/11/2022    LDLCHOLESTEROL 84 07/11/2022    TRIG 60 07/11/2022    ALT 9 07/11/2022    AST 16 07/11/2022     Lab Results   Component Value Date    TSH 3.50 07/11/2022     1. Paroxysmal SVT (supraventricular tachycardia) (HCC)  Controlled with cardizem  Challenged with hypotension no falls, some dizziness. Not taking meclizine hardly at all  2. Atrial fibrillation, unspecified type ([address]  On eliquis rate controlled with cardizem     3. Left carotid bruit      4. Change in bowel habits  Try cologuard. Has had diarrhea/loose stool for > 6 months   - Fecal DNA Colorectal cancer screening (Cologuard)  - cholestyramine (QUESTRAN) 4 g packet; Take 1 packet by mouth daily Diarrhea history of gallbladder removal  Dispense: 30 packet; Refill: 0    5. Screening for colon cancer    - Fecal DNA Colorectal cancer screening (Cologuard)    6. Diarrhea, unspecified type  Doubt infectious. Will monitor     7. Screening mammogram, encounter for    - [facility]OY DIGITAL SCREEN BILATERAL;  Future      Health Maintenance:  Discussed health maintenance issues: breast cancer screening and patient is up to date for health maintenance items  Discussed immunization schedule: recommend annual flu vaccine and patient is up to date for vaccinations    Follow Up  Labs: BMP, A1c, fasting lipids, LFT's, and thyroid levels to be drawn in 6 months  Follow up appt to be scheduled in 6 months to f/u labs    Electronically signed by ANNA MARIE Ulrich CNP on 11/7/2022 at 6:09 PM

## 2023-06-05 ENCOUNTER — OFFICE VISIT (OUTPATIENT)
Dept: FAMILY MEDICINE CLINIC | Age: 88
End: 2023-06-05
Payer: MEDICARE

## 2023-06-05 VITALS
OXYGEN SATURATION: 97 % | DIASTOLIC BLOOD PRESSURE: 80 MMHG | SYSTOLIC BLOOD PRESSURE: 120 MMHG | WEIGHT: 131 LBS | HEART RATE: 70 BPM | BODY MASS INDEX: 21.83 KG/M2 | HEIGHT: 65 IN

## 2023-06-05 DIAGNOSIS — I47.1 PAROXYSMAL SVT (SUPRAVENTRICULAR TACHYCARDIA) (HCC): ICD-10-CM

## 2023-06-05 DIAGNOSIS — R19.4 CHANGE IN BOWEL HABITS: ICD-10-CM

## 2023-06-05 DIAGNOSIS — Z12.31 BREAST CANCER SCREENING BY MAMMOGRAM: ICD-10-CM

## 2023-06-05 DIAGNOSIS — Z80.3 FAMILY HISTORY OF BREAST CANCER: ICD-10-CM

## 2023-06-05 DIAGNOSIS — N63.11 BREAST LUMP ON RIGHT SIDE AT 10 O'CLOCK POSITION: ICD-10-CM

## 2023-06-05 DIAGNOSIS — L40.9 SCALP PSORIASIS: ICD-10-CM

## 2023-06-05 DIAGNOSIS — Z00.00 MEDICARE ANNUAL WELLNESS VISIT, SUBSEQUENT: Primary | ICD-10-CM

## 2023-06-05 PROCEDURE — G0439 PPPS, SUBSEQ VISIT: HCPCS | Performed by: NURSE PRACTITIONER

## 2023-06-05 PROCEDURE — 1123F ACP DISCUSS/DSCN MKR DOCD: CPT | Performed by: NURSE PRACTITIONER

## 2023-06-05 RX ORDER — ACETAMINOPHEN 160 MG
TABLET,DISINTEGRATING ORAL DAILY
COMMUNITY

## 2023-06-05 RX ORDER — MIDODRINE HYDROCHLORIDE 5 MG/1
TABLET ORAL
Qty: 180 TABLET | Refills: 1 | Status: SHIPPED | OUTPATIENT
Start: 2023-06-05

## 2023-06-05 RX ORDER — LACTOSE-REDUCED FOOD
1 POWDER (GRAM) ORAL DAILY PRN
Qty: 1 BOX | Refills: 1 | Status: SHIPPED | OUTPATIENT
Start: 2023-06-05

## 2023-06-05 RX ORDER — CHOLESTYRAMINE 4 G/9G
1 POWDER, FOR SUSPENSION ORAL DAILY
Qty: 90 PACKET | Refills: 0 | Status: SHIPPED | OUTPATIENT
Start: 2023-06-05

## 2023-06-05 SDOH — ECONOMIC STABILITY: FOOD INSECURITY: WITHIN THE PAST 12 MONTHS, THE FOOD YOU BOUGHT JUST DIDN'T LAST AND YOU DIDN'T HAVE MONEY TO GET MORE.: NEVER TRUE

## 2023-06-05 SDOH — ECONOMIC STABILITY: INCOME INSECURITY: HOW HARD IS IT FOR YOU TO PAY FOR THE VERY BASICS LIKE FOOD, HOUSING, MEDICAL CARE, AND HEATING?: NOT HARD AT ALL

## 2023-06-05 SDOH — ECONOMIC STABILITY: FOOD INSECURITY: WITHIN THE PAST 12 MONTHS, YOU WORRIED THAT YOUR FOOD WOULD RUN OUT BEFORE YOU GOT MONEY TO BUY MORE.: NEVER TRUE

## 2023-06-05 ASSESSMENT — PATIENT HEALTH QUESTIONNAIRE - PHQ9
SUM OF ALL RESPONSES TO PHQ9 QUESTIONS 1 & 2: 0
1. LITTLE INTEREST OR PLEASURE IN DOING THINGS: 0
2. FEELING DOWN, DEPRESSED OR HOPELESS: 0
SUM OF ALL RESPONSES TO PHQ QUESTIONS 1-9: 0

## 2023-06-05 ASSESSMENT — LIFESTYLE VARIABLES
HOW MANY STANDARD DRINKS CONTAINING ALCOHOL DO YOU HAVE ON A TYPICAL DAY: PATIENT DOES NOT DRINK
HOW OFTEN DO YOU HAVE A DRINK CONTAINING ALCOHOL: NEVER

## 2023-06-05 NOTE — PATIENT INSTRUCTIONS
9763-1207 mg of calcium and 4057-4395 IU of vitamin D per day. It is possible to meet your calcium requirement with diet alone, but a vitamin D supplement is usually necessary to meet this goal.  When exposed to the sun, use a sunscreen that protects against both UVA and UVB radiation with an SPF of 30 or greater. Reapply every 2 to 3 hours or after sweating, drying off with a towel, or swimming. Always wear a seat belt when traveling in a car. Always wear a helmet when riding a bicycle or motorcycle.

## 2023-06-05 NOTE — PROGRESS NOTES
Medicare Annual Wellness Visit    Vincent Cooley is here for Medicare AWV (Hypotension. )    Assessment & Plan   Medicare annual wellness visit, subsequent  Scalp psoriasis  Change in bowel habits  -     cholestyramine (QUESTRAN) 4 g packet; Take 1 packet by mouth daily Diarrhea history of gallbladder removal, Disp-90 packet, R-0Normal  Paroxysmal SVT (supraventricular tachycardia) (HCC)  Breast cancer screening by mammogram  -     [facility]NO[facility] W OR WO CAD BILATERAL; Future  Family history of breast cancer  -     [facility] DIGITAL DIAGNOSTIC W OR WO CAD BILATERAL; Future  Breast lump on right side at 10 o'clock position  -     [facility]UD[facility] DIGITAL DIAGNOSTIC W OR WO CAD BILATERAL; Future    Recommendations for Preventive Services Due: see orders and patient instructions/AVS.  Recommended screening schedule for the next 5-10 years is provided to the patient in written form: see Patient Instructions/AVS.     Return in 6 months (on 12/5/2023) for HTN check, hypercholesterolemia. Subjective   The following acute and/or chronic problems were also addressed today:  Psoriasis spray, to scalp and upper back     Patient's complete Health Risk Assessment and screening values have been reviewed and are found in Flowsheets. The following problems were reviewed today and where indicated follow up appointments were made and/or referrals ordered.     Positive Risk Factor Screenings with Interventions:                   Hearing Screen:  Do you or your family notice any trouble with your hearing that hasn't been managed with hearing aids?: (!) Yes    Interventions:  Patient declines any further evaluation or treatment     Safety:  Do you have any tripping hazards - loose or unsecured carpets or rugs?: (!) Yes  Interventions:  Patient declined any further interventions or treatment       CV Risk Counseling:  Patient was asked about her current diet and exercise habits, and personalized advice was provided regarding recommended

## 2023-06-21 ENCOUNTER — TELEPHONE (OUTPATIENT)
Dept: FAMILY MEDICINE CLINIC | Age: 88
End: 2023-06-21

## 2023-06-21 DIAGNOSIS — R92.8 ABNORMAL MAMMOGRAM OF RIGHT BREAST: Primary | ICD-10-CM

## 2023-06-27 ENCOUNTER — HOSPITAL ENCOUNTER (OUTPATIENT)
Dept: ULTRASOUND IMAGING | Age: 88
Discharge: HOME | End: 2023-06-29
Payer: MEDICARE

## 2023-06-27 ENCOUNTER — HOSPITAL ENCOUNTER (OUTPATIENT)
Dept: MAMMOGRAPHY | Age: 88
Discharge: HOME | End: 2023-06-29
Payer: MEDICARE

## 2023-06-27 DIAGNOSIS — R92.8 ABNORMAL MAMMOGRAM OF RIGHT BREAST: ICD-10-CM

## 2023-06-27 DIAGNOSIS — N63.11 BREAST LUMP ON RIGHT SIDE AT 10 O'CLOCK POSITION: ICD-10-CM

## 2023-06-27 DIAGNOSIS — Z80.3 FAMILY HISTORY OF BREAST CANCER: ICD-10-CM

## 2023-06-27 DIAGNOSIS — Z12.31 BREAST CANCER SCREENING BY MAMMOGRAM: ICD-10-CM

## 2023-06-27 PROCEDURE — 76642 ULTRASOUND BREAST LIMITED: CPT

## 2023-06-27 PROCEDURE — G0279 TOMOSYNTHESIS, MAMMO: HCPCS

## 2023-07-24 ENCOUNTER — HOSPITAL ENCOUNTER (OUTPATIENT)
Age: 88
Discharge: HOME | End: 2023-07-24
Payer: MEDICARE

## 2023-07-24 ENCOUNTER — HOSPITAL ENCOUNTER (OUTPATIENT)
Dept: GENERAL RADIOLOGY | Age: 88
Discharge: HOME | End: 2023-07-26
Payer: MEDICARE

## 2023-07-24 ENCOUNTER — HOSPITAL ENCOUNTER (OUTPATIENT)
Age: 88
Discharge: HOME | End: 2023-07-26
Payer: MEDICARE

## 2023-07-24 DIAGNOSIS — I11.9 MALIGNANT HYPERTENSIVE HEART DISEASE WITHOUT HEART FAILURE: ICD-10-CM

## 2023-07-24 DIAGNOSIS — E55.9 VITAMIN D DEFICIENCY DISEASE: ICD-10-CM

## 2023-07-24 DIAGNOSIS — I47.1 PAROXYSMAL SUPRAVENTRICULAR TACHYCARDIA (HCC): ICD-10-CM

## 2023-07-24 LAB
ALBUMIN SERPL-MCNC: 4.3 G/DL (ref 3.5–5.2)
ALP SERPL-CCNC: 61 U/L (ref 35–104)
ALT SERPL-CCNC: 9 U/L (ref 5–33)
ANION GAP SERPL CALCULATED.3IONS-SCNC: 10 MMOL/L (ref 9–17)
AST SERPL-CCNC: 19 U/L
BASOPHILS # BLD: 0 K/UL (ref 0–0.2)
BASOPHILS NFR BLD: 1 % (ref 0–2)
BILIRUB SERPL-MCNC: 0.7 MG/DL (ref 0.3–1.2)
BUN SERPL-MCNC: 16 MG/DL (ref 8–23)
BUN/CREAT SERPL: 20 (ref 9–20)
CALCIUM SERPL-MCNC: 9.8 MG/DL (ref 8.6–10.4)
CHLORIDE SERPL-SCNC: 105 MMOL/L (ref 98–107)
CHOLEST SERPL-MCNC: 149 MG/DL
CHOLESTEROL/HDL RATIO: 2.7
CO2 SERPL-SCNC: 26 MMOL/L (ref 20–31)
CREAT SERPL-MCNC: 0.8 MG/DL (ref 0.5–0.9)
DIFFERENTIAL TYPE: YES
EKG ATRIAL RATE: 60 BPM
EKG P AXIS: 64 DEGREES
EKG P-R INTERVAL: 178 MS
EKG Q-T INTERVAL: 416 MS
EKG QRS DURATION: 92 MS
EKG QTC CALCULATION (BAZETT): 416 MS
EKG R AXIS: 55 DEGREES
EKG T AXIS: 72 DEGREES
EKG VENTRICULAR RATE: 60 BPM
EOSINOPHIL # BLD: 0.3 K/UL (ref 0–0.4)
EOSINOPHILS RELATIVE PERCENT: 6 % (ref 0–5)
ERYTHROCYTE [DISTWIDTH] IN BLOOD BY AUTOMATED COUNT: 13.7 % (ref 12.1–15.2)
GFR SERPL CREATININE-BSD FRML MDRD: >60 ML/MIN/1.73M2
GLUCOSE SERPL-MCNC: 93 MG/DL (ref 70–99)
HCT VFR BLD AUTO: 39.4 % (ref 36–46)
HDLC SERPL-MCNC: 56 MG/DL
HGB BLD-MCNC: 13.4 G/DL (ref 12–16)
LDLC SERPL CALC-MCNC: 75 MG/DL (ref 0–130)
LYMPHOCYTES NFR BLD: 1.5 K/UL (ref 1–4.8)
LYMPHOCYTES RELATIVE PERCENT: 30 % (ref 15–40)
MAGNESIUM SERPL-MCNC: 1.9 MG/DL (ref 1.6–2.6)
MCH RBC QN AUTO: 31.4 PG (ref 26–34)
MCHC RBC AUTO-ENTMCNC: 33.9 G/DL (ref 31–37)
MCV RBC AUTO: 92.7 FL (ref 80–100)
MONOCYTES NFR BLD: 0.4 K/UL (ref 0–1)
MONOCYTES NFR BLD: 9 % (ref 4–8)
NEUTROPHILS NFR BLD: 54 % (ref 47–75)
NEUTS SEG NFR BLD: 2.7 K/UL (ref 2.5–7)
PATIENT FASTING?: YES
PLATELET # BLD AUTO: 220 K/UL (ref 140–450)
POTASSIUM SERPL-SCNC: 3.9 MMOL/L (ref 3.7–5.3)
PROT SERPL-MCNC: 7.3 G/DL (ref 6.4–8.3)
RBC # BLD AUTO: 4.25 M/UL (ref 4–5.2)
SODIUM SERPL-SCNC: 141 MMOL/L (ref 135–144)
T4 FREE SERPL-MCNC: 1.2 NG/DL (ref 0.9–1.7)
TRIGL SERPL-MCNC: 92 MG/DL
TSH SERPL DL<=0.05 MIU/L-ACNC: 5.72 UIU/ML (ref 0.3–5)
WBC OTHER # BLD: 4.9 K/UL (ref 3.5–11)

## 2023-07-24 PROCEDURE — 85027 COMPLETE CBC AUTOMATED: CPT

## 2023-07-24 PROCEDURE — 80053 COMPREHEN METABOLIC PANEL: CPT

## 2023-07-24 PROCEDURE — 84439 ASSAY OF FREE THYROXINE: CPT

## 2023-07-24 PROCEDURE — 83735 ASSAY OF MAGNESIUM: CPT

## 2023-07-24 PROCEDURE — 36415 COLL VENOUS BLD VENIPUNCTURE: CPT

## 2023-07-24 PROCEDURE — 80061 LIPID PANEL: CPT

## 2023-07-24 PROCEDURE — 93005 ELECTROCARDIOGRAM TRACING: CPT

## 2023-07-24 PROCEDURE — 93010 ELECTROCARDIOGRAM REPORT: CPT | Performed by: INTERNAL MEDICINE

## 2023-07-24 PROCEDURE — 71046 X-RAY EXAM CHEST 2 VIEWS: CPT

## 2023-07-24 PROCEDURE — 84443 ASSAY THYROID STIM HORMONE: CPT

## 2023-07-27 ENCOUNTER — OFFICE VISIT (OUTPATIENT)
Dept: CARDIOLOGY CLINIC | Age: 88
End: 2023-07-27

## 2023-07-27 VITALS
SYSTOLIC BLOOD PRESSURE: 134 MMHG | BODY MASS INDEX: 21.3 KG/M2 | WEIGHT: 128 LBS | DIASTOLIC BLOOD PRESSURE: 72 MMHG | HEART RATE: 70 BPM | OXYGEN SATURATION: 93 %

## 2023-07-27 DIAGNOSIS — I47.1 PAROXYSMAL SUPRAVENTRICULAR TACHYCARDIA (HCC): Primary | ICD-10-CM

## 2023-07-27 DIAGNOSIS — I47.1 PAROXYSMAL SVT (SUPRAVENTRICULAR TACHYCARDIA) (HCC): ICD-10-CM

## 2023-07-27 DIAGNOSIS — E78.5 HYPERLIPIDEMIA, UNSPECIFIED HYPERLIPIDEMIA TYPE: ICD-10-CM

## 2023-07-27 DIAGNOSIS — E55.9 VITAMIN D DEFICIENCY DISEASE: ICD-10-CM

## 2023-07-27 NOTE — PROGRESS NOTES
Ov DR Rosalia Gibson 1 year follow up  No chest pain or sob  Feels occ skip beat   Occ dizziness   No ankle edema  Bp at home in the 90's  A lot of time only taking  Midodrine once because  She isn't doing much  Janie South told her she needs  To be taking it bid. Encouraged to take it  Bid all the time .     See in 1 year

## 2023-07-31 NOTE — PROGRESS NOTES
Courtney Conner M.D. [facility]   [address] [facility]  [phone]        2023        Solo Gutierrez, CNP  [address] [facility]    RE:   Vidal Palafox  :  1935    Dear Daniela Avila:    CHIEF COMPLAINT:  1. SVT and paroxysmal atrial fibrillation. 2.  Borderline blood pressure in the high 90s and low 100s, although no syncope, with occasional dizziness. HISTORY OF PRESENT ILLNESS:  I had the pleasure of seeing Mrs. Marilyn Miranda in our office on 2023. She is a very pleasant 82-year-old female with a long history of SVT which was discovered in 2017. Echo and stress test were normal in 2019. On 2021, she went to the emergency room, was in atrial fibrillation with RVR and converted to sinus rhythm. She was placed on Eliquis 2.5 mg b.i.d. which she has remained. She has never had a myocardial infarction or cardiac catheterization. She has a history of borderline blood pressures in the high 90s range. We had placed her on midodrine, but she is only taking it once a day. Pressures at home are generally in the high 90s. She tolerates it amazingly well with only occasional dizziness. She has had no syncope or near syncope. She has had no hospitalizations or procedures. Overall, she has been doing well. CARDIAC RISK FACTORS:  Prior MI:  Negative. Hypertension:  Negative. Hyperlipidemia:  Negative. Other Family Members:  Positive. Smoking:  Negative. Diabetes:  Negative. MEDICATIONS:  At home, she is currently on Eliquis 2.5 mg b.i.d., calcium 600 mg daily, vitamin D 2000 units daily, Questran 1 packet daily, Cardizem CD 90 mg daily, midodrine 5 mg once a day, and Ensure daily. PAST MEDICAL AND SURGICAL HISTORY:  1.   SVT with now paroxysmal atrial fibrillation discovered on 2021.  2.  On Eliquis 2.5 mg b.i.d.  3.  Cataract surgery on the right eye on 2015 and on

## 2023-09-11 ENCOUNTER — TELEPHONE (OUTPATIENT)
Dept: FAMILY MEDICINE CLINIC | Age: 88
End: 2023-09-11

## 2023-09-11 RX ORDER — DILTIAZEM HYDROCHLORIDE 90 MG/1
90 CAPSULE, EXTENDED RELEASE ORAL DAILY
Qty: 90 CAPSULE | Refills: 3 | Status: SHIPPED | OUTPATIENT
Start: 2023-09-11

## 2023-09-11 NOTE — TELEPHONE ENCOUNTER
Diltiazem 90 mg    Mail order to [facility]    AWV 6/5/23    Health Maintenance   Topic Date Due    COVID-19 Vaccine (1) Never done    DTaP/Tdap/Td vaccine (1 - Tdap) Never done    Shingles vaccine (1 of 2) Never done    Pneumococcal 65+ years Vaccine (2 - PCV) 01/01/2013    Flu vaccine (1) Never done    Depression Screen  06/05/2024    Annual Wellness Visit (AWV)  06/05/2024    Hepatitis A vaccine  Aged Out    Hepatitis B vaccine  Aged Out    Hib vaccine  Aged Out    Meningococcal (ACWY) vaccine  Aged Out    DEXA (modify frequency per FRAX score)  Discontinued             (applicable per patient's age: Cancer Screenings, Depression Screening, Fall Risk Screening, Immunizations)    LDL Cholesterol (mg/dL)   Date Value   07/24/2023 75     AST (U/L)   Date Value   07/24/2023 19     ALT (U/L)   Date Value   07/24/2023 9     BUN (mg/dL)   Date Value   07/24/2023 16      (goal A1C is < 7)   (goal LDL is <100) need 30-50% reduction from baseline     BP Readings from Last 3 Encounters:   07/27/23 134/72   06/05/23 120/80   11/07/22 120/64    (goal /80)      All Future Testing planned in [facility]:  Lab Frequency Next Occurrence   TSH with Reflex Once 07/27/2024   CBC with Auto Differential Once 07/27/2024   Comprehensive Metabolic Panel Once 78/86/3059   Lipid Panel Once 07/27/2024   Magnesium Once 07/27/2024   Vitamin D 25 Hydroxy Once 07/27/2024   EKG 12 Lead Once 07/27/2024   XR CHEST (2 VW) Once 07/27/2024       Next Visit Date:  Future Appointments   Date Time Provider [address]   10/2/2023  6:00 PM [facility]   7/22/2024 10:30 AM MD Satinder Silveira [facility]            Patient Active Problem List:     Left carotid bruit     Corn of foot     Family history of heart disease     Chronic cough     Acquired deviated nasal septum     Paroxysmal SVT (supraventricular tachycardia) ([address])

## 2023-09-25 ENCOUNTER — TELEPHONE (OUTPATIENT)
Dept: FAMILY MEDICINE CLINIC | Age: 88
End: 2023-09-25

## 2023-09-25 DIAGNOSIS — R19.4 CHANGE IN BOWEL HABITS: ICD-10-CM

## 2023-09-25 NOTE — TELEPHONE ENCOUNTER
Cholestyramine 4 g    [facility]    Health Maintenance   Topic Date Due    COVID-19 Vaccine (1) Never done    DTaP/Tdap/Td vaccine (1 - Tdap) Never done    Shingles vaccine (1 of 2) Never done    Pneumococcal 65+ years Vaccine (2 - PCV) 01/01/2013    Flu vaccine (1) Never done    Depression Screen  06/05/2024    Annual Wellness Visit (AWV)  06/05/2024    Hepatitis A vaccine  Aged Out    Hepatitis B vaccine  Aged Out    Hib vaccine  Aged Out    Meningococcal (ACWY) vaccine  Aged Out    DEXA (modify frequency per FRAX score)  Discontinued             (applicable per patient's age: Cancer Screenings, Depression Screening, Fall Risk Screening, Immunizations)    LDL Cholesterol (mg/dL)   Date Value   07/24/2023 75     AST (U/L)   Date Value   07/24/2023 19     ALT (U/L)   Date Value   07/24/2023 9     BUN (mg/dL)   Date Value   07/24/2023 16      (goal A1C is < 7)   (goal LDL is <100) need 30-50% reduction from baseline     BP Readings from Last 3 Encounters:   07/27/23 134/72   06/05/23 120/80   11/07/22 120/64    (goal /80)      All Future Testing planned in [facility]:  Lab Frequency Next Occurrence   TSH with Reflex Once 07/27/2024   CBC with Auto Differential Once 07/27/2024   Comprehensive Metabolic Panel Once 07/63/5743   Lipid Panel Once 07/27/2024   Magnesium Once 07/27/2024   Vitamin D 25 Hydroxy Once 07/27/2024   EKG 12 Lead Once 07/27/2024   XR CHEST (2 VW) Once 07/27/2024       Next Visit Date:  Future Appointments   Date Time Provider [address]   10/2/2023  6:00 PM ANNA MARIE Ramon - CNP [facility]   7/22/2024 10:30 AM Renu Mederos MD [facility]            Patient Active Problem List:     Left carotid bruit     Corn of foot     Family history of heart disease     Chronic cough     Acquired deviated nasal septum     Paroxysmal SVT (supraventricular tachycardia) ([address])

## 2023-09-26 RX ORDER — CHOLESTYRAMINE 4 G/9G
1 POWDER, FOR SUSPENSION ORAL DAILY
Qty: 90 PACKET | Refills: 1 | Status: SHIPPED | OUTPATIENT
Start: 2023-09-26

## 2023-10-02 ENCOUNTER — OFFICE VISIT (OUTPATIENT)
Dept: FAMILY MEDICINE CLINIC | Age: 88
End: 2023-10-02

## 2023-10-02 VITALS
WEIGHT: 130 LBS | OXYGEN SATURATION: 99 % | HEIGHT: 65 IN | BODY MASS INDEX: 21.66 KG/M2 | DIASTOLIC BLOOD PRESSURE: 76 MMHG | HEART RATE: 70 BPM | SYSTOLIC BLOOD PRESSURE: 124 MMHG

## 2023-10-02 DIAGNOSIS — R09.89 LEFT CAROTID BRUIT: ICD-10-CM

## 2023-10-02 DIAGNOSIS — R19.4 CHANGE IN BOWEL HABITS: ICD-10-CM

## 2023-10-02 DIAGNOSIS — R64 CACHEXIA (HCC): ICD-10-CM

## 2023-10-02 DIAGNOSIS — I48.91 ATRIAL FIBRILLATION, UNSPECIFIED TYPE (HCC): ICD-10-CM

## 2023-10-02 DIAGNOSIS — D68.69 SECONDARY HYPERCOAGULABLE STATE (HCC): ICD-10-CM

## 2023-10-02 DIAGNOSIS — I47.10 PAROXYSMAL SVT (SUPRAVENTRICULAR TACHYCARDIA): Primary | ICD-10-CM

## 2023-10-02 DIAGNOSIS — R19.7 DIARRHEA, UNSPECIFIED TYPE: ICD-10-CM

## 2023-10-02 RX ORDER — CHOLESTYRAMINE 4 G/9G
1 POWDER, FOR SUSPENSION ORAL 2 TIMES DAILY
Qty: 90 PACKET | Refills: 1 | Status: SHIPPED
Start: 2023-10-02 | End: 2023-10-04 | Stop reason: SDUPTHER

## 2023-10-02 RX ORDER — MIDODRINE HYDROCHLORIDE 5 MG/1
TABLET ORAL
Qty: 180 TABLET | Refills: 1 | Status: SHIPPED | OUTPATIENT
Start: 2023-10-02 | End: 2023-10-02

## 2023-10-02 RX ORDER — LACTOSE-REDUCED FOOD
1 POWDER (GRAM) ORAL DAILY
Qty: 9900 ML | Refills: 0 | Status: SHIPPED | OUTPATIENT
Start: 2023-10-02 | End: 2023-10-02 | Stop reason: SDUPTHER

## 2023-10-02 RX ORDER — LACTOSE-REDUCED FOOD
1 POWDER (GRAM) ORAL DAILY
Qty: 9900 ML | Refills: 0 | Status: SHIPPED | OUTPATIENT
Start: 2023-10-02

## 2023-10-02 RX ORDER — MIDODRINE HYDROCHLORIDE 5 MG/1
TABLET ORAL
Qty: 180 TABLET | Refills: 1 | Status: SHIPPED | OUTPATIENT
Start: 2023-10-02

## 2023-10-02 NOTE — PROGRESS NOTES
this encounter. On this date 10/2/2023 I have spent 25 minutes reviewing previous notes, test results and face to face with the patient discussing the diagnosis and importance of compliance with the treatment plan as well as documenting on the day of the visit.      Electronically signed by ANNA MARIE Ha CNP on 10/8/2023 at 5:56 PM

## 2023-10-02 NOTE — PATIENT INSTRUCTIONS
Phone: [phone]  [facility]: [facility]  Monday: [place] office location 8-5 ([phone]) Offering additional late hours the first Monday of the month until 7 pm.   Tuesday: 8-5 Wednesday: 8-5 Thursday:  Additional hours offered 2 Thursdays a month. Please call to inquire those dates. Fridays: 7:30-4:30   SURVEY:    You may be receiving a survey from [employer] regarding your visit today. Please complete the survey to enable us to provide the highest quality of care to you and your family. If you cannot score us a very good on any question, please call the office to discuss how we could have made your experience a very good one. Thank you.

## 2023-10-04 DIAGNOSIS — R19.4 CHANGE IN BOWEL HABITS: ICD-10-CM

## 2023-10-04 RX ORDER — CHOLESTYRAMINE 4 G/9G
1 POWDER, FOR SUSPENSION ORAL 2 TIMES DAILY
Qty: 60 PACKET | Refills: 0 | Status: SHIPPED | OUTPATIENT
Start: 2023-10-04

## 2023-10-08 PROBLEM — D68.69 SECONDARY HYPERCOAGULABLE STATE (HCC): Status: ACTIVE | Noted: 2023-10-08

## 2023-10-08 ASSESSMENT — ENCOUNTER SYMPTOMS
WHEEZING: 0
SHORTNESS OF BREATH: 0
CHEST TIGHTNESS: 0
EYES NEGATIVE: 1
SORE THROAT: 0
COUGH: 0

## 2023-11-21 NOTE — PROCEDURES
NURSING DAILY NOTE    Name: Jason Lima  Date of Admission: 11/12/2023  Admitting Diagnosis: Thalamic hemorrhage (HCC)  Attending Physician: Lisa Lee D.o.  Allergies: Penicillins    Safety  Patient Assist  omax 2  Patient Precautions  oFall Risk  Precaution Comments  oLeft hemiparesis, left visual inattention  Bed Transfer Status  oMaximal Assist  Toilet Transfer Status  oTotal Assist X 2 (Mod A towards right wc to toilet, 2 person to left toilet to wc)  Assistive Devices  oRails  Oxygen  oNone - Room Air  Diet/Therapeutic Dining  o  Current Diet Order   Procedures    Diet Order Diet: Level 6 - Soft and Bite Sized (2 gram sodium restriction; medications whole with thin liquids); Liquid level: Level 0 - Thin; Second Modifier: (optional): Consistent CHO (Diabetic)     Pill Administration  owhole  Agitated Behavioral Scale  o   ABS Level of Severity  o     Fall Risk  Has the patient had a fall this admission?  Jonatan Hamilton Fall Risk Scoring  o25, HIGH RISK  Fall Risk Safety Measures  maddie alarm, chair alarm, and seatbelt alarm    Vitals  Temperature: 36.6 °C (97.9 °F)  Temp src: Oral  Pulse: 68  Respiration: 18  Blood Pressure: 135/84  Blood Pressure MAP (Calculated): 101 MM HG  BP Location: Right, Upper Arm  Patient BP Position: Supine    Oxygen  Pulse Oximetry: 95 %  O2 (LPM): 0  FiO2%: 21 %  O2 Delivery Device: None - Room Air    Bowel and Bladder  Last Bowel Movement  o11/20/23  Stool Type  oType 6: Fluffy pieces with ragged edges, a mushy stool  Bowel Device  o   Continent  oBladder: Continent void  oBowel: Continent movement  Bladder Function  oUrine Void (mL): 400 ml (urinals)  Number of Times Voided: 1  Urine Color: Yellow  Genitourinary Assessment  oBladder Assessment (WDL):  Within Defined Limits  Echols Catheter: Not Applicable  Urine Color: Yellow  Bladder Device: Urinal, Absorbent Brief (Pull Up)  Time Void: No  Bladder Scan: Post Void  $ Bladder Scan Results (mL): 26    Skin  [place]  [place] [id number]                                 EVENT MONITOR    PATIENT NAME: Demetrius Durand                     :        1935  MED REC NO:   [id number]                              ROOM:  ACCOUNT NO:   [de-identified]                           ADMIT DATE: 2019  PROVIDER:     Ale Arnold    DATE OF STUDY:  2019    TEST TYPE:  Event monitor. She wore an event recorder from 2019 to 2019. We received 8  transmissions. She had 4 episodes of SVT up to 180 beats per minute,  usually lasting 10 to 15 seconds. She remained otherwise in sinus  rhythm. She had no bradycardia. This is an abnormal event recorder  demonstrating multiple episodes of SVT up to 180 beats per minute. She  is on Cardizem at 30 mg b.i.d., and I would consider increasing this  dose to, perhaps, Cardizem  mg daily to see if her SVT could be  better controlled. She would, of course, be a candidate for ablation,  but I would try full medical therapy first in view of her age of 82.         Arya Juares    D: 2019 3:44:07       T: 2019 5:47:02     [email or web address]  Job#: [id number]     Doc#: [id number]    CC:  Elan Lebron Score  o 16  Sensory Interventions  o Bed Types: Standard/Trauma Mattress  Skin Preventative Measures: Pillows in Use for Support / Positioning  Moisture Interventions  oMoisturizers/Barriers: Barrier Wipes      Pain  Pain Rating Scale  o0 - No Pain  Pain Location  oBack  Pain Location Orientation  oUpper  Pain Interventions  oDeclines    ADLs    Bathing  oShower, Staff  Linen Change  oComplete  Personal Hygiene  oMoist Deja Wipes  Chlorhexidine Bath  o   Oral Care  oBrushed Teeth  Teeth/Dentures  o   Shave  oSelf  Nutrition Percentage Eaten  o*  * Meal *  *, Dinner  Environmental Precautions  Jessa in Low Position, Treaded Slipper Socks on Patient  Patient Turns/Positioning  oPatient Turns Self from Side to Side  Patient Turns Assistance/Tolerance  oAssistance of One, Tolerates Well  Bed Positions  Jessa Controls On  Head of Bed Elevated  oSelf regulated      Psychosocial/Neurologic Assessment  Psychosocial Assessment  oPsychosocial (WDL):  Within Defined Limits  Patient Behaviors: Flat Affect  Neurologic Assessment  oNeuro (WDL): Exceptions to WDL  Level of Consciousness: Alert  Orientation Level: Oriented X4  Cognition: Appropriate judgement  Speech: Clear, Slurred  Facial Symmetry: Left facial drooping  Pupil Assesment: Yes  R Pupil Size (mm): 3  R Pupil Shape / Description: Round  R Pupil Reaction: Brisk  L Pupil Size (mm): 3  L Pupil Shape / Description: Round  L Pupil Reaction: Brisk  Reflexes: Cough  Cough Reflex: Present  Motor Function/Sensation Assessment: Dorsiflexion, Motor response  R Foot Dorsiflexion: Strong  L Foot Dorsiflexion: Absent  RUE Motor Response: Responds to commands  RUE Sensation: Full sensation  Muscle Strength Right Arm: Normal Strength Against Gravity and Full Resistance  LUE Motor Response: Flaccid  LUE Sensation: Tingling, Numbness  Muscle Strength Left Arm: Weak Movement but Not Against Gravity or Resistance  RLE Motor Response: Responds to commands  RLE Sensation: Full  sensation  Muscle Strength Right Leg: Good Strength Against Gravity and Moderate Resistance  LLE Motor Response: Flaccid  LLE Sensation: Tingling, Numbness  Muscle Strength Left Leg: Weak Movement but Not Against Gravity or Resistance  oEENT (WDL):  WDL Except    Cardio/Pulmonary Assessment  Edema  o   Respiratory Breath Sounds  oRUL Breath Sounds: Clear  RML Breath Sounds: Clear  RLL Breath Sounds: Diminished  MOHAMUD Breath Sounds: Clear  LLL Breath Sounds: Diminished  Cardiac Assessment  oCardiac (WDL):  Within Defined Limits

## 2024-01-02 DIAGNOSIS — R19.4 CHANGE IN BOWEL HABITS: ICD-10-CM

## 2024-01-02 RX ORDER — CHOLESTYRAMINE 4 G/9G
1 POWDER, FOR SUSPENSION ORAL 2 TIMES DAILY
Qty: 60 PACKET | Refills: 2 | Status: SHIPPED | OUTPATIENT
Start: 2024-01-02 | End: 2024-01-03 | Stop reason: SDUPTHER

## 2024-01-02 NOTE — TELEPHONE ENCOUNTER
Last OV: 10/2/2023  Last RX: 10/4/2023   Next scheduled apt: 5/6/2024      Pt requesting refill, needs [facility]

## 2024-01-03 DIAGNOSIS — R19.4 CHANGE IN BOWEL HABITS: ICD-10-CM

## 2024-01-03 RX ORDER — CHOLESTYRAMINE 4 G/9G
1 POWDER, FOR SUSPENSION ORAL 2 TIMES DAILY
Qty: 60 PACKET | Refills: 2 | Status: SHIPPED | OUTPATIENT
Start: 2024-01-03

## 2024-01-03 NOTE — TELEPHONE ENCOUNTER
[facility]ill [facility] and told her they do not have this. She would like it sent to [facility].

## 2024-04-17 ENCOUNTER — TELEPHONE (OUTPATIENT)
Dept: PRIMARY CARE CLINIC | Age: 89
End: 2024-04-17

## 2024-04-18 NOTE — TELEPHONE ENCOUNTER
Inform patient ensure samples are available in ana office location. (In my office on desk )   See if her family or herself can come to pick them up.     Chocolate.     Thanks  Sil THRASHER CNP

## 2024-06-03 ENCOUNTER — OFFICE VISIT (OUTPATIENT)
Dept: FAMILY MEDICINE CLINIC | Age: 89
End: 2024-06-03
Payer: MEDICARE

## 2024-06-03 VITALS
DIASTOLIC BLOOD PRESSURE: 70 MMHG | BODY MASS INDEX: 21.59 KG/M2 | HEIGHT: 65 IN | HEART RATE: 65 BPM | OXYGEN SATURATION: 98 % | WEIGHT: 129.6 LBS | SYSTOLIC BLOOD PRESSURE: 110 MMHG

## 2024-06-03 DIAGNOSIS — I48.91 ATRIAL FIBRILLATION, UNSPECIFIED TYPE (HCC): Primary | ICD-10-CM

## 2024-06-03 DIAGNOSIS — D68.69 SECONDARY HYPERCOAGULABLE STATE (HCC): ICD-10-CM

## 2024-06-03 DIAGNOSIS — R64 CACHEXIA (HCC): ICD-10-CM

## 2024-06-03 DIAGNOSIS — I47.10 PAROXYSMAL SVT (SUPRAVENTRICULAR TACHYCARDIA) (HCC): ICD-10-CM

## 2024-06-03 PROCEDURE — 1036F TOBACCO NON-USER: CPT | Performed by: NURSE PRACTITIONER

## 2024-06-03 PROCEDURE — G8427 DOCREV CUR MEDS BY ELIG CLIN: HCPCS | Performed by: NURSE PRACTITIONER

## 2024-06-03 PROCEDURE — 1123F ACP DISCUSS/DSCN MKR DOCD: CPT | Performed by: NURSE PRACTITIONER

## 2024-06-03 PROCEDURE — G8420 CALC BMI NORM PARAMETERS: HCPCS | Performed by: NURSE PRACTITIONER

## 2024-06-03 PROCEDURE — 1090F PRES/ABSN URINE INCON ASSESS: CPT | Performed by: NURSE PRACTITIONER

## 2024-06-03 PROCEDURE — 99213 OFFICE O/P EST LOW 20 MIN: CPT | Performed by: NURSE PRACTITIONER

## 2024-06-03 RX ORDER — LACTOSE-REDUCED FOOD
1 POWDER (GRAM) ORAL DAILY
Qty: 2 BOX | Refills: 2 | Status: SHIPPED | OUTPATIENT
Start: 2024-06-03 | End: 2024-06-03 | Stop reason: SDUPTHER

## 2024-06-03 RX ORDER — LACTOSE-REDUCED FOOD
1 POWDER (GRAM) ORAL DAILY
Qty: 2 BOX | Refills: 2 | Status: SHIPPED | OUTPATIENT
Start: 2024-06-03

## 2024-06-03 ASSESSMENT — PATIENT HEALTH QUESTIONNAIRE - PHQ9
SUM OF ALL RESPONSES TO PHQ QUESTIONS 1-9: 0
2. FEELING DOWN, DEPRESSED OR HOPELESS: NOT AT ALL
SUM OF ALL RESPONSES TO PHQ QUESTIONS 1-9: 0
1. LITTLE INTEREST OR PLEASURE IN DOING THINGS: NOT AT ALL
SUM OF ALL RESPONSES TO PHQ QUESTIONS 1-9: 0
SUM OF ALL RESPONSES TO PHQ QUESTIONS 1-9: 0
SUM OF ALL RESPONSES TO PHQ9 QUESTIONS 1 & 2: 0

## 2024-06-03 NOTE — PROGRESS NOTES
HPI Notes    Name: Suzanne Partida  : 1935         Chief Complaint:     Chief Complaint   Patient presents with    Check-Up     Hypotension       History of Present Illness:        HPI    Pt with URI symptoms in APril and had lightheaded feeling and , and grandson at work happened to also.   Doing okay since.   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     Hx SVT and atrial fib on eliquis anticoagulation and rate controlled with cardizem  No chest pain, no dizziness.     Not needing to use questran for bowels any longer.       Past Medical History:     Past Medical History:   Diagnosis Date    Paroxysmal SVT (supraventricular tachycardia) (HCC) 2017      Reviewed all health maintenance requirements and ordered appropriate tests  Health Maintenance Due   Topic Date Due    COVID-19 Vaccine (1) Never done    DTaP/Tdap/Td vaccine (1 - Tdap) Never done    Shingles vaccine (1 of 2) Never done    Respiratory Syncytial Virus (RSV) Pregnant or age 60 yrs+ (1 - 1-dose 60+ series) Never done    Pneumococcal 65+ years Vaccine (2 of 2 - PCV) 2013    Annual Wellness Visit (Medicare)  2024       Past Surgical History:     Past Surgical History:   Procedure Laterality Date    CHOLECYSTECTOMY      EYE SURGERY Right 8/24/15    cataract    EYE SURGERY Left 10/01/2015    cataract    OTHER SURGICAL HISTORY  2017    carotid eval Dr. Ritter with good results no significant stenosis but tortuous ICA no follow needed        Medications:       Prior to Admission medications    Medication Sig Start Date End Date Taking? Authorizing Provider   Calcium Carb-Cholecalciferol (CALCIUM 1000 + D PO) Take by mouth   Yes Provider, MD Juventino   Nutritional Supplements (ENSURE MAX PROTEIN) LIQD Take 1 Bottle by mouth daily For cachexia 6/3/24  Yes Sil Santoro, APRN - CNP   midodrine (PROAMATINE) 5 MG tablet Take 1 pill in am and repeat 1 pill 4 hours later   8 am and 12

## 2024-06-03 NOTE — PATIENT INSTRUCTIONS
THANK YOU FOR TRUSTING US WITH YOUR HEALTHCARE !!    The associates caring for you today were:    Family Practice Provider: Sil THRASHER-RUI    Clinical Team Nurse: Cielo Kong LPN    Clinical Team Medical Assistant: Debbie Dunn MA    Our goal is to provide you with EXCEPTIONAL patient care, and we strive to do this for EVERY patient, EVERY visit. Since we care about you and your experience, you may receive a patient satisfaction survey from [employer] by postal mail/email/text. We truly welcome your feedback and ask that you complete the survey to let us know how we are doing.    Important Numbers:  [facility] phone [phone]   [place] Office Nurse/MA Line: [phone]  Fax  [phone]   Central [phone]  Billing questions: [phone]  Medical Records Request: [phone]    Manage your healthcare  with [facility] My[facility]. To activate your account, visit [email or web address]   DIGITAL scheduling available now through my chart.  Schedule your next appointment at your convenience through your my chart.       [place] Office Hours:  Monday: [place] office location 8-5 ([phone]) Offering additional late hours the first Monday of the month until 7 pm.   Tuesday: 8: :  812 Noon, closed  of the month   : 7:30-4:30   SURVEY:    You may be receiving a survey from [employer] regarding your visit today.    Please complete the survey to enable us to provide the highest quality of care to you and your family.    If you cannot score us a very good on any question, please call the office to discuss how we could have made your experience a very good one.    Thank you.

## 2024-06-06 ASSESSMENT — ENCOUNTER SYMPTOMS
EYES NEGATIVE: 1
SORE THROAT: 0
COUGH: 0
CHEST TIGHTNESS: 0
SINUS PRESSURE: 0
RHINORRHEA: 0
SHORTNESS OF BREATH: 0
WHEEZING: 0
SINUS PAIN: 0

## 2024-07-15 ENCOUNTER — TELEPHONE (OUTPATIENT)
Dept: FAMILY MEDICINE CLINIC | Age: 89
End: 2024-07-15

## 2024-07-15 DIAGNOSIS — Z77.011 LEAD EXPOSURE: Primary | ICD-10-CM

## 2024-07-15 NOTE — TELEPHONE ENCOUNTER
Patient would like for Cielo to call her back.  She would like to see about getting tested for Lead.  She states she found out she has lead in her water.      Health Maintenance   Topic Date Due    COVID-19 Vaccine (1) Never done    DTaP/Tdap/Td vaccine (1 - Tdap) Never done    Shingles vaccine (1 of 2) Never done    Respiratory Syncytial Virus (RSV) Pregnant or age 60 yrs+ (1 - 1-dose 60+ series) Never done    Pneumococcal 65+ years Vaccine (2 of 2 - PCV) 01/01/2013    Annual Wellness Visit (Medicare)  06/05/2024    Flu vaccine (1) 08/01/2024    Depression Screen  06/03/2025    Hepatitis A vaccine  Aged Out    Hepatitis B vaccine  Aged Out    Hib vaccine  Aged Out    Polio vaccine  Aged Out    Meningococcal (ACWY) vaccine  Aged Out    DEXA (modify frequency per FRAX score)  Discontinued             (applicable per patient's age: Cancer Screenings, Depression Screening, Fall Risk Screening, Immunizations)    AST (U/L)   Date Value   07/24/2023 19     ALT (U/L)   Date Value   07/24/2023 9     BUN (mg/dL)   Date Value   07/24/2023 16      (goal A1C is < 7)   (goal LDL is <100) need 30-50% reduction from baseline     BP Readings from Last 3 Encounters:   06/03/24 110/70   10/02/23 124/76   07/27/23 134/72    (goal /80)      All Future Testing planned in [facility]:  Lab Frequency Next Occurrence   TSH with Reflex Once 07/27/2024   CBC with Auto Differential Once 07/27/2024   Comprehensive Metabolic Panel Once 07/27/2024   Lipid Panel Once 07/27/2024   Magnesium Once 07/27/2024   Vitamin D 25 Hydroxy Once 07/27/2024   EKG 12 Lead Once 07/27/2024   XR CHEST (2 VW) Once 07/27/2024       Next Visit Date:  Future Appointments   Date Time Provider Department Center   7/22/2024 10:30 AM Lionel Haley MD [facility]   12/2/2024  5:40 PM Sil Santoro, APRN - CNP [facility]            Patient Active Problem List:     Left carotid bruit     Corn of foot     Family history of heart disease     Chronic

## 2024-07-15 NOTE — TELEPHONE ENCOUNTER
Pt stated she found out approx 2 months ago that she had lead in her water. She will be getting labs done for Dr Haley soon, and is asking if PCP wanted to test her for lead? If so, please place orders and I will call pt

## 2024-07-18 ENCOUNTER — HOSPITAL ENCOUNTER (OUTPATIENT)
Age: 89
Discharge: HOME | End: 2024-07-20
Payer: MEDICARE

## 2024-07-18 ENCOUNTER — HOSPITAL ENCOUNTER (OUTPATIENT)
Dept: GENERAL RADIOLOGY | Age: 89
Discharge: HOME | End: 2024-07-20
Payer: MEDICARE

## 2024-07-18 ENCOUNTER — HOSPITAL ENCOUNTER (OUTPATIENT)
Age: 89
Discharge: HOME | End: 2024-07-18
Payer: MEDICARE

## 2024-07-18 DIAGNOSIS — E55.9 VITAMIN D DEFICIENCY DISEASE: ICD-10-CM

## 2024-07-18 DIAGNOSIS — I47.10 PAROXYSMAL SUPRAVENTRICULAR TACHYCARDIA (HCC): ICD-10-CM

## 2024-07-18 DIAGNOSIS — E78.5 HYPERLIPIDEMIA, UNSPECIFIED HYPERLIPIDEMIA TYPE: ICD-10-CM

## 2024-07-18 DIAGNOSIS — I47.10 PAROXYSMAL SVT (SUPRAVENTRICULAR TACHYCARDIA) (HCC): ICD-10-CM

## 2024-07-18 DIAGNOSIS — Z77.011 LEAD EXPOSURE: ICD-10-CM

## 2024-07-18 LAB
25(OH)D3 SERPL-MCNC: 77.3 NG/ML (ref 30–100)
ALBUMIN SERPL-MCNC: 4.2 G/DL (ref 3.5–5.2)
ALP SERPL-CCNC: 63 U/L (ref 35–104)
ALT SERPL-CCNC: 7 U/L (ref 5–33)
ANION GAP SERPL CALCULATED.3IONS-SCNC: 16 MMOL/L (ref 9–17)
AST SERPL-CCNC: 22 U/L
BASOPHILS # BLD: 0.04 K/UL (ref 0–0.2)
BASOPHILS NFR BLD: 1 % (ref 0–2)
BILIRUB SERPL-MCNC: 0.8 MG/DL (ref 0.3–1.2)
BUN SERPL-MCNC: 19 MG/DL (ref 8–23)
BUN/CREAT SERPL: 27 (ref 9–20)
CALCIUM SERPL-MCNC: 9.7 MG/DL (ref 8.6–10.4)
CHLORIDE SERPL-SCNC: 103 MMOL/L (ref 98–107)
CHOLEST SERPL-MCNC: 181 MG/DL (ref 0–199)
CHOLESTEROL/HDL RATIO: 3
CO2 SERPL-SCNC: 22 MMOL/L (ref 20–31)
CREAT SERPL-MCNC: 0.7 MG/DL (ref 0.5–0.9)
EOSINOPHIL # BLD: 0.31 K/UL (ref 0–0.4)
EOSINOPHILS RELATIVE PERCENT: 7 % (ref 0–5)
ERYTHROCYTE [DISTWIDTH] IN BLOOD BY AUTOMATED COUNT: 12.9 % (ref 12.1–15.2)
GFR, ESTIMATED: 84 ML/MIN/1.73M2
GLUCOSE SERPL-MCNC: 113 MG/DL (ref 70–99)
HCT VFR BLD AUTO: 40.5 % (ref 36–46)
HDLC SERPL-MCNC: 56 MG/DL
HGB BLD-MCNC: 13.3 G/DL (ref 12–16)
IMM GRANULOCYTES # BLD AUTO: 0.01 K/UL (ref 0–0.3)
IMM GRANULOCYTES NFR BLD: 0 % (ref 0–5)
LDLC SERPL CALC-MCNC: 106 MG/DL (ref 0–100)
LYMPHOCYTES NFR BLD: 1.37 K/UL (ref 1–4.8)
LYMPHOCYTES RELATIVE PERCENT: 30 % (ref 15–40)
MAGNESIUM SERPL-MCNC: 2 MG/DL (ref 1.6–2.6)
MCH RBC QN AUTO: 31.1 PG (ref 26–34)
MCHC RBC AUTO-ENTMCNC: 32.8 G/DL (ref 31–37)
MCV RBC AUTO: 94.8 FL (ref 80–100)
MONOCYTES NFR BLD: 0.45 K/UL (ref 0–1)
MONOCYTES NFR BLD: 10 % (ref 4–8)
NEUTROPHILS NFR BLD: 52 % (ref 47–75)
NEUTS SEG NFR BLD: 2.47 K/UL (ref 2.5–7)
PLATELET # BLD AUTO: 217 K/UL (ref 140–450)
PMV BLD AUTO: 10.3 FL (ref 6–12)
POTASSIUM SERPL-SCNC: 4 MMOL/L (ref 3.7–5.3)
PROT SERPL-MCNC: 7.2 G/DL (ref 6.4–8.3)
RBC # BLD AUTO: 4.27 M/UL (ref 4–5.2)
SODIUM SERPL-SCNC: 141 MMOL/L (ref 135–144)
TRIGL SERPL-MCNC: 91 MG/DL
TSH SERPL DL<=0.05 MIU/L-ACNC: 4.42 UIU/ML (ref 0.3–5)
VLDLC SERPL CALC-MCNC: 18 MG/DL
WBC OTHER # BLD: 4.7 K/UL (ref 3.5–11)

## 2024-07-18 PROCEDURE — 85025 COMPLETE CBC W/AUTO DIFF WBC: CPT

## 2024-07-18 PROCEDURE — 82300 ASSAY OF CADMIUM: CPT

## 2024-07-18 PROCEDURE — 83825 ASSAY OF MERCURY: CPT

## 2024-07-18 PROCEDURE — 84443 ASSAY THYROID STIM HORMONE: CPT

## 2024-07-18 PROCEDURE — 82306 VITAMIN D 25 HYDROXY: CPT

## 2024-07-18 PROCEDURE — 83655 ASSAY OF LEAD: CPT

## 2024-07-18 PROCEDURE — 36415 COLL VENOUS BLD VENIPUNCTURE: CPT

## 2024-07-18 PROCEDURE — 80053 COMPREHEN METABOLIC PANEL: CPT

## 2024-07-18 PROCEDURE — 82175 ASSAY OF ARSENIC: CPT

## 2024-07-18 PROCEDURE — 83735 ASSAY OF MAGNESIUM: CPT

## 2024-07-18 PROCEDURE — 80061 LIPID PANEL: CPT

## 2024-07-18 PROCEDURE — 71046 X-RAY EXAM CHEST 2 VIEWS: CPT

## 2024-07-21 LAB
ARSENIC BLD-MCNC: <10 UG/L
CADMIUM BLD-MCNC: <1 UG/L
EKG ATRIAL RATE: 62 BPM
EKG P AXIS: 62 DEGREES
EKG P-R INTERVAL: 184 MS
EKG Q-T INTERVAL: 418 MS
EKG QRS DURATION: 86 MS
EKG QTC CALCULATION (BAZETT): 424 MS
EKG R AXIS: 22 DEGREES
EKG T AXIS: 65 DEGREES
EKG VENTRICULAR RATE: 62 BPM
LEAD BLDV-MCNC: <2 UG/DL
MERCURY BLD-MCNC: <2.5 UG/L

## 2024-07-22 ENCOUNTER — OFFICE VISIT (OUTPATIENT)
Dept: CARDIOLOGY CLINIC | Age: 89
End: 2024-07-22
Payer: MEDICARE

## 2024-07-22 VITALS
BODY MASS INDEX: 21.47 KG/M2 | HEART RATE: 70 BPM | WEIGHT: 129 LBS | SYSTOLIC BLOOD PRESSURE: 140 MMHG | DIASTOLIC BLOOD PRESSURE: 60 MMHG | OXYGEN SATURATION: 95 %

## 2024-07-22 DIAGNOSIS — E78.5 HYPERLIPIDEMIA, UNSPECIFIED HYPERLIPIDEMIA TYPE: ICD-10-CM

## 2024-07-22 DIAGNOSIS — I47.10 PAROXYSMAL SUPRAVENTRICULAR TACHYCARDIA (HCC): Primary | ICD-10-CM

## 2024-07-22 DIAGNOSIS — E55.9 VITAMIN D DEFICIENCY DISEASE: ICD-10-CM

## 2024-07-22 PROCEDURE — G8420 CALC BMI NORM PARAMETERS: HCPCS | Performed by: INTERNAL MEDICINE

## 2024-07-22 PROCEDURE — 1123F ACP DISCUSS/DSCN MKR DOCD: CPT | Performed by: INTERNAL MEDICINE

## 2024-07-22 PROCEDURE — G8427 DOCREV CUR MEDS BY ELIG CLIN: HCPCS | Performed by: INTERNAL MEDICINE

## 2024-07-22 PROCEDURE — 1036F TOBACCO NON-USER: CPT | Performed by: INTERNAL MEDICINE

## 2024-07-22 PROCEDURE — 99214 OFFICE O/P EST MOD 30 MIN: CPT | Performed by: INTERNAL MEDICINE

## 2024-07-22 PROCEDURE — 1090F PRES/ABSN URINE INCON ASSESS: CPT | Performed by: INTERNAL MEDICINE

## 2024-07-22 RX ORDER — MIDODRINE HYDROCHLORIDE 5 MG/1
10 TABLET ORAL DAILY
COMMUNITY
End: 2024-07-22 | Stop reason: SDUPTHER

## 2024-07-22 RX ORDER — MIDODRINE HYDROCHLORIDE 10 MG/1
10 TABLET ORAL DAILY
Qty: 90 TABLET | Refills: 3 | Status: SHIPPED | OUTPATIENT
Start: 2024-07-22

## 2024-07-22 NOTE — PROGRESS NOTES
Ov DR Haley 1 year follow up  No chest pain or sob  Occ dizziness   Bp home runs 90's  No edema  No hospitalizations or   Procedures since seen.    Will  change  midodrine to   10 mg daily.    Will see in 1 year.

## 2024-07-29 NOTE — PROGRESS NOTES
Dillan Haley MD  [facility]  [facility]  [address] [id number] [phone]        2024        Sil Santoro, CNP   [address] [place] [address]     RE:  Suzanne Partida  :  1935    Dear Lyly:    CHIEF COMPLAINT:    Paroxysmal atrial fibrillation and SVT.   Hypotension, on midodrine.    HISTORY OF PRESENT ILLNESS:  I had the pleasure of seeing Mrs. Suzanne Partida in our office on 2024.  She is a very pleasant 89-year-old female with a long history of SVT discovered in 2017.      On 2021, she went to the emergency room, was in atrial fibrillation with RVR, converted to sinus rhythm and she was placed on Eliquis 2.5 mg b.i.d.      She has no further known episodes of atrial fibrillation or SVT.  She has been on Cardizem which has controlled her nicely.    She does have rather low blood pressures in the 90 and 100s range, but has had no syncope or near-syncope.  She is on midodrine 5 mg b.i.d., but rarely remembers to take the second dose.  Therefore, her blood pressures remain in the high 90s and low 100 range, although no syncope or near-syncope.    She has had no hospitalizations or procedures.  She has had no PND, orthopnea, or pedal edema.    Her 32-year-old grandson is living with her at this point.    She has never had a myocardial infarction or cardiac catheterization.    CARDIAC RISK FACTORS:  Prior MI: Negative.  Hypertension: Negative  Hyperlipidemia: Negative.  Other Family Members: Positive.  Smoking: Negative.  Diabetes: Negative.    MEDICATIONS:  At this time:  Eliquis 2.5 mg b.i.d.  Calcium 1000 mg daily.  Cardizem 12 hour 90 mg daily.  Midodrine 5 mg daily (was supposed to be twice a day, but she takes once a day.).  Ensure 1 bottle daily.  Vitamin D 5000 units daily.    PAST MEDICAL AND SURGICAL HISTORY:    SVT with known paroxysmal atrial fibrillation discovered on

## 2024-08-29 RX ORDER — DILTIAZEM HYDROCHLORIDE 90 MG/1
90 CAPSULE, EXTENDED RELEASE ORAL DAILY
Qty: 90 CAPSULE | Refills: 3 | Status: SHIPPED | OUTPATIENT
Start: 2024-08-29

## 2024-12-02 ENCOUNTER — OFFICE VISIT (OUTPATIENT)
Dept: FAMILY MEDICINE CLINIC | Age: 89
End: 2024-12-02
Payer: MEDICARE

## 2024-12-02 VITALS
DIASTOLIC BLOOD PRESSURE: 70 MMHG | WEIGHT: 129 LBS | OXYGEN SATURATION: 97 % | SYSTOLIC BLOOD PRESSURE: 120 MMHG | BODY MASS INDEX: 22.02 KG/M2 | HEIGHT: 64 IN | HEART RATE: 65 BPM

## 2024-12-02 DIAGNOSIS — I47.10 PAROXYSMAL SVT (SUPRAVENTRICULAR TACHYCARDIA) (HCC): ICD-10-CM

## 2024-12-02 DIAGNOSIS — D68.69 SECONDARY HYPERCOAGULABLE STATE (HCC): ICD-10-CM

## 2024-12-02 DIAGNOSIS — Z00.00 MEDICARE ANNUAL WELLNESS VISIT, SUBSEQUENT: Primary | ICD-10-CM

## 2024-12-02 PROCEDURE — G8484 FLU IMMUNIZE NO ADMIN: HCPCS | Performed by: NURSE PRACTITIONER

## 2024-12-02 PROCEDURE — 1160F RVW MEDS BY RX/DR IN RCRD: CPT | Performed by: NURSE PRACTITIONER

## 2024-12-02 PROCEDURE — G0439 PPPS, SUBSEQ VISIT: HCPCS | Performed by: NURSE PRACTITIONER

## 2024-12-02 PROCEDURE — 1123F ACP DISCUSS/DSCN MKR DOCD: CPT | Performed by: NURSE PRACTITIONER

## 2024-12-02 PROCEDURE — 1159F MED LIST DOCD IN RCRD: CPT | Performed by: NURSE PRACTITIONER

## 2024-12-02 SDOH — ECONOMIC STABILITY: FOOD INSECURITY: WITHIN THE PAST 12 MONTHS, THE FOOD YOU BOUGHT JUST DIDN'T LAST AND YOU DIDN'T HAVE MONEY TO GET MORE.: NEVER TRUE

## 2024-12-02 SDOH — ECONOMIC STABILITY: FOOD INSECURITY: WITHIN THE PAST 12 MONTHS, YOU WORRIED THAT YOUR FOOD WOULD RUN OUT BEFORE YOU GOT MONEY TO BUY MORE.: NEVER TRUE

## 2024-12-02 SDOH — ECONOMIC STABILITY: INCOME INSECURITY: HOW HARD IS IT FOR YOU TO PAY FOR THE VERY BASICS LIKE FOOD, HOUSING, MEDICAL CARE, AND HEATING?: NOT HARD AT ALL

## 2024-12-02 ASSESSMENT — PATIENT HEALTH QUESTIONNAIRE - PHQ9
SUM OF ALL RESPONSES TO PHQ QUESTIONS 1-9: 0
2. FEELING DOWN, DEPRESSED OR HOPELESS: NOT AT ALL
SUM OF ALL RESPONSES TO PHQ QUESTIONS 1-9: 0
SUM OF ALL RESPONSES TO PHQ9 QUESTIONS 1 & 2: 0
1. LITTLE INTEREST OR PLEASURE IN DOING THINGS: NOT AT ALL

## 2024-12-02 NOTE — PATIENT INSTRUCTIONS
fixed income and feel that they can't afford dental care. But most towns and cities have programs in which dentists help older adults by lowering fees. Contact your area's public health offices or  for information about dental care in your area.  Using a toothbrush  Older adults with arthritis sometimes have trouble brushing their teeth because they can't easily hold the toothbrush. Their hands and fingers may be stiff, painful, or weak. If this is the case, you can:  Offer an electric toothbrush.  Enlarge the handle of a non-electric toothbrush by wrapping a sponge, an elastic bandage, or adhesive tape around it.  Push the toothbrush handle through a ball made of rubber or soft foam.  Make the handle longer and thicker by taping Popsicle sticks or tongue depressors to it.  You may also be able to buy special toothbrushes, toothpaste dispensers, and floss holders.  Your doctor may recommend a soft-bristle toothbrush if the person you care for bleeds easily. Bleeding can happen because of a health problem or from certain medicines.  A toothpaste for sensitive teeth may help if the person you care for has sensitive teeth.  How do you brush and floss someone's teeth?  If the person you are caring for has a hard time cleaning their teeth on their own, you may need to brush and floss their teeth for them. It may be easiest to have the person sit and face away from you, and to sit or stand behind them. That way you can steady their head against your arm as you reach around to floss and brush their teeth. Choose a place that has good lighting and is comfortable for both of you.  Before you begin, gather your supplies. You will need gloves, floss, a toothbrush, and a container to hold water if you are not near a sink. Wash and dry your hands well and put on gloves. Start by flossing:  Gently work a piece of floss between each of the teeth toward the gums. A plastic flossing tool may make this easier, and they

## 2024-12-02 NOTE — PROGRESS NOTES
lymphadenopathy  Pulmonary/Chest: clear to auscultation bilaterally- no wheezes, rales or rhonchi, normal air movement, no respiratory distress  Cardiovascular: normal rate, regular rhythm, normal S1 and S2, no murmurs, rubs, clicks, or gallops, distal pulses intact, no carotid bruits  Abdomen: soft, non-tender, non-distended, normal bowel sounds, no masses or organomegaly  Extremities: no cyanosis, clubbing or edema  Musculoskeletal: normal range of motion, no joint swelling, deformity or tenderness  Neurologic:  gait, coordination and speech normal          No Known Allergies  Prior to Visit Medications    Medication Sig Taking? Authorizing Provider   dilTIAZem (CARDIZEM 12 HR) 90 MG extended release capsule Take 1 capsule by mouth daily Yes Sil Santoro APRN - CNP   Vitamin D-Vitamin K (VITAMIN D2 + K1 PO) Take by mouth 5000iu and 5mcg k Yes Juventino Berkowitz MD   midodrine (PROAMATINE) 10 MG tablet Take 1 tablet by mouth daily Yes Lionel Haley MD   apixaban (ELIQUIS) 2.5 MG TABS tablet Take 1 tablet by mouth 2 times daily For atrial fibrillation Yes Sil Santoro APRN - CNP   Calcium Carb-Cholecalciferol (CALCIUM 1000 + D PO) Take by mouth Yes Juventino Berkowitz MD   Nutritional Supplements (ENSURE MAX PROTEIN) LIQD Take 1 Bottle by mouth daily For cachexia Yes Sil Santoro APRN - CNP   Multiple Vitamins-Minerals (EYE MULTIVITAMIN) CAPS Take by mouth daily Eye promise Yes Juventino Berkowitz MD   glucosamine-chondroitin 500-400 MG CAPS Take 1 capsule by mouth daily Yes Juventino Berkowitz MD   Multiple Vitamins-Minerals (THERAPEUTIC MULTIVITAMIN-MINERALS) tablet Take 1 tablet by mouth daily Yes Juventino Berkowitz MD       CareTeam (Including outside providers/suppliers regularly involved in providing care):   Patient Care Team:  Sil Santoro APRN - CNP as PCP - General  Sil Santoro APRN - CNP as PCP - Empaneled Provider      Reviewed and updated this visit:  Tobacco  Allergies

## 2025-06-02 ENCOUNTER — OFFICE VISIT (OUTPATIENT)
Dept: FAMILY MEDICINE CLINIC | Age: 89
End: 2025-06-02
Payer: MEDICARE

## 2025-06-02 VITALS
DIASTOLIC BLOOD PRESSURE: 58 MMHG | HEART RATE: 78 BPM | SYSTOLIC BLOOD PRESSURE: 118 MMHG | OXYGEN SATURATION: 98 % | HEIGHT: 64 IN | WEIGHT: 128.8 LBS | BODY MASS INDEX: 21.99 KG/M2

## 2025-06-02 DIAGNOSIS — D68.69 SECONDARY HYPERCOAGULABLE STATE: ICD-10-CM

## 2025-06-02 DIAGNOSIS — I47.10 PAROXYSMAL SVT (SUPRAVENTRICULAR TACHYCARDIA): ICD-10-CM

## 2025-06-02 DIAGNOSIS — I48.91 ATRIAL FIBRILLATION, UNSPECIFIED TYPE (HCC): Primary | ICD-10-CM

## 2025-06-02 PROCEDURE — 99213 OFFICE O/P EST LOW 20 MIN: CPT | Performed by: NURSE PRACTITIONER

## 2025-06-02 PROCEDURE — G8427 DOCREV CUR MEDS BY ELIG CLIN: HCPCS | Performed by: NURSE PRACTITIONER

## 2025-06-02 PROCEDURE — 1123F ACP DISCUSS/DSCN MKR DOCD: CPT | Performed by: NURSE PRACTITIONER

## 2025-06-02 PROCEDURE — 1160F RVW MEDS BY RX/DR IN RCRD: CPT | Performed by: NURSE PRACTITIONER

## 2025-06-02 PROCEDURE — 1090F PRES/ABSN URINE INCON ASSESS: CPT | Performed by: NURSE PRACTITIONER

## 2025-06-02 PROCEDURE — G8420 CALC BMI NORM PARAMETERS: HCPCS | Performed by: NURSE PRACTITIONER

## 2025-06-02 PROCEDURE — 1036F TOBACCO NON-USER: CPT | Performed by: NURSE PRACTITIONER

## 2025-06-02 PROCEDURE — 1159F MED LIST DOCD IN RCRD: CPT | Performed by: NURSE PRACTITIONER

## 2025-06-02 RX ORDER — DILTIAZEM HYDROCHLORIDE 90 MG/1
90 CAPSULE, EXTENDED RELEASE ORAL DAILY
Qty: 90 CAPSULE | Refills: 3 | Status: SHIPPED | OUTPATIENT
Start: 2025-06-02

## 2025-06-02 SDOH — ECONOMIC STABILITY: FOOD INSECURITY: WITHIN THE PAST 12 MONTHS, THE FOOD YOU BOUGHT JUST DIDN'T LAST AND YOU DIDN'T HAVE MONEY TO GET MORE.: NEVER TRUE

## 2025-06-02 SDOH — ECONOMIC STABILITY: FOOD INSECURITY: WITHIN THE PAST 12 MONTHS, YOU WORRIED THAT YOUR FOOD WOULD RUN OUT BEFORE YOU GOT MONEY TO BUY MORE.: NEVER TRUE

## 2025-06-02 ASSESSMENT — PATIENT HEALTH QUESTIONNAIRE - PHQ9
SUM OF ALL RESPONSES TO PHQ QUESTIONS 1-9: 0
2. FEELING DOWN, DEPRESSED OR HOPELESS: NOT AT ALL
1. LITTLE INTEREST OR PLEASURE IN DOING THINGS: NOT AT ALL
SUM OF ALL RESPONSES TO PHQ QUESTIONS 1-9: 0

## 2025-06-02 NOTE — PATIENT INSTRUCTIONS
THANK YOU FOR TRUSTING US WITH YOUR HEALTHCARE !!    The associates caring for you today were:    Family Practice Provider: Sil TRHASHER-RUI    Clinical Team Nurse: Cielo Kong LPN    Clinical Team Medical Assistant: Debbie Dunn MA    Our goal is to provide you with EXCEPTIONAL patient care, and we strive to do this for EVERY patient, EVERY visit. Since we care about you and your experience, you may receive a patient satisfaction survey from [facility] by postal mail/email/text. We truly welcome your feedback and ask that you complete the survey to let us know how we are doing.    Important Numbers:  [facility] phone [phone]   [facility] Nurse/MA Line: [phone]  Fax  [phone]   Central [phone]  Billing questions: [phone]  Medical Records Request: [phone]    Manage your healthcare  with [facility]. To activate your account, visit [email or web address]   DIGITAL scheduling available now through my chart.  Schedule your next appointment at your convenience through your my chart.       [facility] Office Hours:  Monday: [place] office location 8-5 ([phone]) Offering additional late hours the first Monday of the month until 7 pm.   Tuesday: : :  812 Noon, closed  of the month   Fridays: 7:30-4:30

## 2025-06-02 NOTE — PROGRESS NOTES
HPI Notes    Name: Suzanne Partida  : 1935         Chief Complaint:     Chief Complaint   Patient presents with    Hypertension     Pt presents for 6 month f/u for HTN       History of Present Illness:        HPI    89 year old female    In Feb some rectal bleeding with hemorrhoids.   Skipped one dose eliquis.   Wears pad all the time.     Quick urge at night and feels like has to use bathroom.     Ensure protein shakes 1 per day.   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         Past Medical History:     Past Medical History:   Diagnosis Date    Paroxysmal SVT (supraventricular tachycardia) 2017      Reviewed all health maintenance requirements and ordered appropriate tests  Health Maintenance Due   Topic Date Due    DTaP/Tdap/Td vaccine (1 - Tdap) Never done    Shingles vaccine (1 of 2) Never done    Respiratory Syncytial Virus (RSV) Pregnant or age 60 yrs+ (1 - 1-dose 75+ series) Never done    Pneumococcal 50+ years Vaccine (2 of 2 - PCV) 2013    COVID-19 Vaccine ( - - season) Never done       Past Surgical History:     Past Surgical History:   Procedure Laterality Date    CHOLECYSTECTOMY      EYE SURGERY Right 8/24/15    cataract    EYE SURGERY Left 10/01/2015    cataract    OTHER SURGICAL HISTORY  2017    carotid eval Dr. Ritter with good results no significant stenosis but tortuous ICA no follow needed        Medications:       Prior to Admission medications    Medication Sig Start Date End Date Taking? Authorizing Provider   apixaban (ELIQUIS) 2.5 MG TABS tablet Take 1 tablet by mouth 2 times daily For atrial fibrillation 25  Yes Sil Santoro, APRN - CNP   dilTIAZem (CARDIZEM 12 HR) 90 MG extended release capsule Take 1 capsule by mouth daily 25  Yes Sil Santoro, APRN - CNP   Vitamin D-Vitamin K (VITAMIN D2 + K1 PO) Take by

## 2025-06-04 ASSESSMENT — ENCOUNTER SYMPTOMS
WHEEZING: 0
SORE THROAT: 0
CHEST TIGHTNESS: 0
SHORTNESS OF BREATH: 0
EYES NEGATIVE: 1
COUGH: 0

## 2025-07-18 ENCOUNTER — HOSPITAL ENCOUNTER (OUTPATIENT)
Dept: NON INVASIVE DIAGNOSTICS | Age: 89
Discharge: HOME | End: 2025-07-18
Payer: MEDICARE

## 2025-07-18 ENCOUNTER — HOSPITAL ENCOUNTER (OUTPATIENT)
Dept: GENERAL RADIOLOGY | Age: 89
Discharge: HOME | End: 2025-07-20
Payer: MEDICARE

## 2025-07-18 ENCOUNTER — HOSPITAL ENCOUNTER (OUTPATIENT)
Age: 89
Discharge: HOME | End: 2025-07-18
Payer: MEDICARE

## 2025-07-18 DIAGNOSIS — E78.5 HYPERLIPIDEMIA, UNSPECIFIED HYPERLIPIDEMIA TYPE: ICD-10-CM

## 2025-07-18 DIAGNOSIS — E55.9 VITAMIN D DEFICIENCY DISEASE: ICD-10-CM

## 2025-07-18 DIAGNOSIS — I47.10 PAROXYSMAL SUPRAVENTRICULAR TACHYCARDIA: ICD-10-CM

## 2025-07-18 LAB
25(OH)D3 SERPL-MCNC: >120 NG/ML (ref 30–100)
ALBUMIN SERPL-MCNC: 4.2 G/DL (ref 3.5–5.2)
ALBUMIN/GLOB SERPL: 1.5 {RATIO} (ref 1–2.5)
ALP SERPL-CCNC: 56 U/L (ref 35–104)
ALT SERPL-CCNC: 8 U/L (ref 5–33)
ANION GAP SERPL CALCULATED.3IONS-SCNC: 9 MMOL/L (ref 9–17)
AST SERPL-CCNC: 19 U/L
BASOPHILS # BLD: 0.03 K/UL (ref 0–0.2)
BASOPHILS NFR BLD: 1 % (ref 0–2)
BILIRUB SERPL-MCNC: 0.6 MG/DL (ref 0.3–1.2)
BUN SERPL-MCNC: 20 MG/DL (ref 8–23)
CALCIUM SERPL-MCNC: 9.7 MG/DL (ref 8.6–10.4)
CHLORIDE SERPL-SCNC: 105 MMOL/L (ref 98–107)
CHOLEST SERPL-MCNC: 184 MG/DL (ref 0–199)
CHOLESTEROL/HDL RATIO: 3.4
CO2 SERPL-SCNC: 29 MMOL/L (ref 20–31)
CREAT SERPL-MCNC: 0.8 MG/DL (ref 0.5–0.9)
EKG ATRIAL RATE: 56 BPM
EKG P AXIS: 63 DEGREES
EKG P-R INTERVAL: 176 MS
EKG Q-T INTERVAL: 422 MS
EKG QRS DURATION: 86 MS
EKG QTC CALCULATION (BAZETT): 407 MS
EKG R AXIS: 32 DEGREES
EKG T AXIS: 62 DEGREES
EKG VENTRICULAR RATE: 56 BPM
EOSINOPHIL # BLD: 0.24 K/UL (ref 0–0.4)
EOSINOPHILS RELATIVE PERCENT: 5 % (ref 0–5)
ERYTHROCYTE [DISTWIDTH] IN BLOOD BY AUTOMATED COUNT: 12.9 % (ref 12.1–15.2)
GFR, ESTIMATED: 71 ML/MIN/1.73M2
GLUCOSE SERPL-MCNC: 97 MG/DL (ref 70–99)
HCT VFR BLD AUTO: 39.4 % (ref 36–46)
HDLC SERPL-MCNC: 54 MG/DL
HGB BLD-MCNC: 12.8 G/DL (ref 12–16)
IMM GRANULOCYTES # BLD AUTO: 0 K/UL (ref 0–0.3)
IMM GRANULOCYTES NFR BLD: 0 % (ref 0–5)
LDLC SERPL CALC-MCNC: 112 MG/DL (ref 0–100)
LYMPHOCYTES NFR BLD: 1.2 K/UL (ref 1–4.8)
LYMPHOCYTES RELATIVE PERCENT: 24 % (ref 15–40)
MAGNESIUM SERPL-MCNC: 2.1 MG/DL (ref 1.6–2.6)
MCH RBC QN AUTO: 30.8 PG (ref 26–34)
MCHC RBC AUTO-ENTMCNC: 32.5 G/DL (ref 31–37)
MCV RBC AUTO: 94.9 FL (ref 80–100)
MONOCYTES NFR BLD: 0.46 K/UL (ref 0–1)
MONOCYTES NFR BLD: 9 % (ref 4–8)
NEUTROPHILS NFR BLD: 61 % (ref 47–75)
NEUTS SEG NFR BLD: 3.09 K/UL (ref 2.5–7)
PLATELET # BLD AUTO: 228 K/UL (ref 140–450)
PMV BLD AUTO: 10.6 FL (ref 6–12)
POTASSIUM SERPL-SCNC: 4.1 MMOL/L (ref 3.7–5.3)
PROT SERPL-MCNC: 7 G/DL (ref 6.4–8.3)
RBC # BLD AUTO: 4.15 M/UL (ref 4–5.2)
SODIUM SERPL-SCNC: 143 MMOL/L (ref 135–144)
TRIGL SERPL-MCNC: 90 MG/DL
TSH SERPL DL<=0.05 MIU/L-ACNC: 4.06 UIU/ML (ref 0.27–4.2)
VLDLC SERPL CALC-MCNC: 18 MG/DL (ref 1–30)
WBC OTHER # BLD: 5 K/UL (ref 3.5–11)

## 2025-07-18 PROCEDURE — 93010 ELECTROCARDIOGRAM REPORT: CPT | Performed by: INTERNAL MEDICINE

## 2025-07-18 PROCEDURE — 71046 X-RAY EXAM CHEST 2 VIEWS: CPT

## 2025-07-18 PROCEDURE — 36415 COLL VENOUS BLD VENIPUNCTURE: CPT

## 2025-07-18 PROCEDURE — 82306 VITAMIN D 25 HYDROXY: CPT

## 2025-07-18 PROCEDURE — 93005 ELECTROCARDIOGRAM TRACING: CPT

## 2025-07-18 PROCEDURE — 83735 ASSAY OF MAGNESIUM: CPT

## 2025-07-18 PROCEDURE — 80053 COMPREHEN METABOLIC PANEL: CPT

## 2025-07-18 PROCEDURE — 84443 ASSAY THYROID STIM HORMONE: CPT

## 2025-07-18 PROCEDURE — 85025 COMPLETE CBC W/AUTO DIFF WBC: CPT

## 2025-07-18 PROCEDURE — 80061 LIPID PANEL: CPT

## 2025-07-21 ENCOUNTER — OFFICE VISIT (OUTPATIENT)
Dept: CARDIOLOGY CLINIC | Age: 89
End: 2025-07-21
Payer: MEDICARE

## 2025-07-21 VITALS
SYSTOLIC BLOOD PRESSURE: 121 MMHG | BODY MASS INDEX: 22.18 KG/M2 | HEART RATE: 75 BPM | WEIGHT: 128 LBS | OXYGEN SATURATION: 95 % | DIASTOLIC BLOOD PRESSURE: 69 MMHG

## 2025-07-21 DIAGNOSIS — E55.9 VITAMIN D DEFICIENCY DISEASE: ICD-10-CM

## 2025-07-21 DIAGNOSIS — E78.5 HYPERLIPIDEMIA, UNSPECIFIED HYPERLIPIDEMIA TYPE: ICD-10-CM

## 2025-07-21 DIAGNOSIS — I47.10 PAROXYSMAL SVT (SUPRAVENTRICULAR TACHYCARDIA): ICD-10-CM

## 2025-07-21 DIAGNOSIS — I47.10 PAROXYSMAL SUPRAVENTRICULAR TACHYCARDIA: Primary | ICD-10-CM

## 2025-07-21 PROCEDURE — 1090F PRES/ABSN URINE INCON ASSESS: CPT | Performed by: INTERNAL MEDICINE

## 2025-07-21 PROCEDURE — G8427 DOCREV CUR MEDS BY ELIG CLIN: HCPCS | Performed by: INTERNAL MEDICINE

## 2025-07-21 PROCEDURE — 1123F ACP DISCUSS/DSCN MKR DOCD: CPT | Performed by: INTERNAL MEDICINE

## 2025-07-21 PROCEDURE — 1159F MED LIST DOCD IN RCRD: CPT | Performed by: INTERNAL MEDICINE

## 2025-07-21 PROCEDURE — 1036F TOBACCO NON-USER: CPT | Performed by: INTERNAL MEDICINE

## 2025-07-21 PROCEDURE — 99214 OFFICE O/P EST MOD 30 MIN: CPT | Performed by: INTERNAL MEDICINE

## 2025-07-21 PROCEDURE — G8420 CALC BMI NORM PARAMETERS: HCPCS | Performed by: INTERNAL MEDICINE

## 2025-07-21 NOTE — PROGRESS NOTES
Naseem Edwards, DO, FACC, FACOI, FCCP   [facility] [place]       LOV 2024        Sil Santoro, CNP   [address] [id number]     RE:  Suzanne Partida  :  1935    Dear Lyly:    CHIEF COMPLAINT:    Paroxysmal atrial fibrillation and SVT.   Hypotension, on midodrine.    HISTORY OF PRESENT ILLNESS:  I had the pleasure of seeing Mrs. Suzanne Partida in our office on 2025.  She is a very pleasant 89-year-old female with a long history of SVT discovered in 2017.  She comes in for a 1 year follow-up and has been doing relatively well over the last year.    On 2021, she went to the emergency room, was in atrial fibrillation with RVR, converted to sinus rhythm and she was placed on Eliquis 2.5 mg b.i.d.      She has had no further documented episodes of atrial fibrillation or SVT.  She has been on Cardizem which has seemed to provide good control.    She does have relatively low blood pressures at times but in the 90 and 100s range, without symptoms.  She is on midodrine 5 mg b.i.d., but rarely remembers to take the second dose.  Therefore, her blood pressures remain in the high 90s and low 100 range, although no syncope or near-syncope.    She does not drink much fluids and primarily will have some lemonade, chocolate milk and Ensure.  She has not used compression garments.  Her exercise in the summer is walking to the bus stop to collect her grandkids and walk them back.  She will also walk to the post office at times.    She has had no hospitalizations or procedures.  She has had no PND, orthopnea, or pedal edema.    Her 33-year-old grandson is living with her at this point.    She continues to have some insomnia having a hard time falling asleep and having to take naps during the day.    She has never had a myocardial infarction or cardiac catheterization.    CARDIAC RISK FACTORS:  Prior MI: Negative.  Hypertension:

## 2025-07-21 NOTE — PROGRESS NOTES
Ov Dr. Edwards for one year follow up   No new palpitatnos  No new sob   No chest pain   No news sx since last appt per pt     Start taking Vit D every other day     Follow up in one year